# Patient Record
Sex: FEMALE | Race: WHITE | Employment: PART TIME | URBAN - NONMETROPOLITAN AREA
[De-identification: names, ages, dates, MRNs, and addresses within clinical notes are randomized per-mention and may not be internally consistent; named-entity substitution may affect disease eponyms.]

---

## 2019-10-07 ENCOUNTER — HOSPITAL ENCOUNTER (EMERGENCY)
Age: 39
Discharge: ANOTHER HOSPITAL | End: 2019-10-07
Payer: MEDICARE

## 2019-10-07 VITALS
OXYGEN SATURATION: 97 % | BODY MASS INDEX: 36.32 KG/M2 | HEIGHT: 60 IN | HEART RATE: 88 BPM | SYSTOLIC BLOOD PRESSURE: 133 MMHG | RESPIRATION RATE: 18 BRPM | TEMPERATURE: 98.7 F | DIASTOLIC BLOOD PRESSURE: 70 MMHG | WEIGHT: 185 LBS

## 2019-10-07 DIAGNOSIS — R10.9 FLANK PAIN: Primary | ICD-10-CM

## 2019-10-07 LAB
BILIRUBIN URINE: NEGATIVE
BLOOD, URINE: NEGATIVE
CHARACTER, URINE: CLEAR
COLOR: YELLOW
GLUCOSE, URINE: NEGATIVE MG/DL
KETONES, URINE: NEGATIVE
LEUKOCYTES, UA: NEGATIVE
NITRATE, UA: NEGATIVE
PH UA: 5.5 (ref 5–9)
PROTEIN UA: NEGATIVE MG/DL
REFLEX TO URINE C & S: NORMAL
SPECIFIC GRAVITY UA: 1.01 (ref 1–1.03)
UROBILINOGEN, URINE: 0.2 EU/DL (ref 0–1)

## 2019-10-07 PROCEDURE — 81003 URINALYSIS AUTO W/O SCOPE: CPT

## 2019-10-07 PROCEDURE — 99204 OFFICE O/P NEW MOD 45 MIN: CPT

## 2019-10-07 PROCEDURE — 99203 OFFICE O/P NEW LOW 30 MIN: CPT | Performed by: NURSE PRACTITIONER

## 2019-10-07 ASSESSMENT — PAIN DESCRIPTION - PROGRESSION: CLINICAL_PROGRESSION: GRADUALLY WORSENING

## 2019-10-07 ASSESSMENT — PAIN SCALES - GENERAL: PAINLEVEL_OUTOF10: 4

## 2019-10-07 ASSESSMENT — PAIN DESCRIPTION - PAIN TYPE: TYPE: ACUTE PAIN

## 2019-10-07 ASSESSMENT — ENCOUNTER SYMPTOMS: ABDOMINAL PAIN: 0

## 2019-10-07 ASSESSMENT — PAIN DESCRIPTION - DESCRIPTORS: DESCRIPTORS: ACHING;DISCOMFORT

## 2019-10-07 ASSESSMENT — PAIN DESCRIPTION - FREQUENCY: FREQUENCY: CONTINUOUS

## 2019-10-07 ASSESSMENT — PAIN DESCRIPTION - ORIENTATION: ORIENTATION: LEFT;MID

## 2019-10-07 ASSESSMENT — PAIN DESCRIPTION - LOCATION: LOCATION: BACK

## 2021-07-25 ENCOUNTER — HOSPITAL ENCOUNTER (EMERGENCY)
Age: 41
Discharge: HOME | End: 2021-07-25
Payer: COMMERCIAL

## 2021-07-25 VITALS
HEIGHT: 60 IN | BODY MASS INDEX: 40.44 KG/M2 | HEART RATE: 99 BPM | RESPIRATION RATE: 16 BRPM | TEMPERATURE: 97.6 F | DIASTOLIC BLOOD PRESSURE: 69 MMHG | OXYGEN SATURATION: 100 % | WEIGHT: 206 LBS | SYSTOLIC BLOOD PRESSURE: 153 MMHG

## 2021-07-25 DIAGNOSIS — K08.89 PAIN, DENTAL: Primary | ICD-10-CM

## 2021-07-25 PROCEDURE — 99213 OFFICE O/P EST LOW 20 MIN: CPT | Performed by: NURSE PRACTITIONER

## 2021-07-25 PROCEDURE — 99213 OFFICE O/P EST LOW 20 MIN: CPT

## 2021-07-25 RX ORDER — LIDOCAINE HYDROCHLORIDE 20 MG/ML
SOLUTION OROPHARYNGEAL
Qty: 50 ML | Refills: 0 | Status: SHIPPED | OUTPATIENT
Start: 2021-07-25 | End: 2022-09-26

## 2021-07-25 RX ORDER — CHLORHEXIDINE GLUCONATE 0.12 MG/ML
15 RINSE ORAL 2 TIMES DAILY
Qty: 420 ML | Refills: 0 | Status: SHIPPED | OUTPATIENT
Start: 2021-07-25 | End: 2021-08-08

## 2021-07-25 RX ORDER — AMOXICILLIN 500 MG/1
500 CAPSULE ORAL 2 TIMES DAILY
Qty: 20 CAPSULE | Refills: 0 | Status: SHIPPED | OUTPATIENT
Start: 2021-07-25 | End: 2021-08-04

## 2021-07-25 ASSESSMENT — PAIN DESCRIPTION - ORIENTATION: ORIENTATION: RIGHT;UPPER

## 2021-07-25 ASSESSMENT — ENCOUNTER SYMPTOMS
SORE THROAT: 0
VOMITING: 0
FACIAL SWELLING: 0
RHINORRHEA: 0
NAUSEA: 0

## 2021-07-25 ASSESSMENT — PAIN SCALES - GENERAL: PAINLEVEL_OUTOF10: 7

## 2021-07-25 ASSESSMENT — PAIN DESCRIPTION - LOCATION: LOCATION: TEETH

## 2021-07-25 NOTE — ED PROVIDER NOTES
[place] [address]  Urgent Care Encounter       CHIEF COMPLAINT       Chief Complaint   Patient presents with    Dental Pain       Nurses Notes reviewed and I agree except as noted in the HPI. HISTORY OF PRESENT ILLNESS   Chelsey Fall is a 37 y.o. female who presents with complaints of dental pain. Pain is stemming from a right upper second molar that had a root canal and cap approximately 1 year ago. Patient states she has had problems with this tooth ever since the root canal.  Over the last 4 to 5days the pain has become much worse. Patient thinks she may have been grinding her teeth due to stress. She has been unable to chew on the right side of her mouth and the tooth is extremely sensitive to pressure. Patient has a medical marijuana card has been smoking marijuana for the pain. She is trying to find a new dentist.    The history is provided by the patient. REVIEW OF SYSTEMS     Review of Systems   Constitutional: Negative for fever. HENT: Positive for dental problem. Negative for facial swelling, rhinorrhea and sore throat. Gastrointestinal: Negative for nausea and vomiting. Skin: Negative for wound. Neurological: Negative for headaches. PAST MEDICAL HISTORY         Diagnosis Date    Back pain     chronic    Cystitis, interstitial     Esophageal ulcer     Headache(784.0)     Hyperlipidemia     Insulin resistance     Irritable bowel syndrome     Spondylolisthesis        SURGICALHISTORY     Patient  has a past surgical history that includes cyst removal (2011); Hysterectomy (2010); Abdominal adhesion surgery (2012); Colonoscopy (2005, 2011); Tonsillectomy and adenoidectomy (1987); and [place] tooth extraction (1996). CURRENT MEDICATIONS       Previous Medications    No medications on file       ALLERGIES     Patient is is allergic to other. Patients   There is no immunization history on file for this patient.     FAMILY HISTORY     Patient's family history includes Alcohol Abuse in her father, mother, and sister; Arthritis in her father and mother; Heart Disease in her mother; Migraines in her mother; Obesity in her father; Osteoarthritis in her father and mother. SOCIAL HISTORY     Patient  reports that she has been smoking cigarettes. She has a 8.00 pack-year smoking history. She has never used smokeless tobacco. She reports current drug use. Drug: Marijuana. She reports that she does not drink alcohol. PHYSICAL EXAM     ED TRIAGE VITALS  BP: (!) 153/69, Temp: 97.6 °F (36.4 °C), Pulse: 99, Resp: 16, SpO2: 100 %,Estimated body mass index is 40.23 kg/m² as calculated from the following:    Height as of this encounter: 5' (1.524 m). Weight as of this encounter: 206 lb (93.4 kg). ,No LMP recorded. Patient has had a hysterectomy. Physical Exam  Vitals and nursing note reviewed. Constitutional:       General: She is not in acute distress. Appearance: She is well-developed. HENT:      Head: Normocephalic and atraumatic. Mouth/Throat:      Lips: Pink. Mouth: Mucous membranes are moist.      Dentition: Abnormal dentition (Right upper second molar capped). Dental tenderness (Right upper second molar) present. No gingival swelling or dental abscesses. Pulmonary:      Effort: Pulmonary effort is normal. No respiratory distress. Skin:     General: Skin is warm and dry. Neurological:      General: No focal deficit present. Mental Status: She is alert and oriented to person, place, and time. Psychiatric:         Mood and Affect: Mood normal.         Speech: Speech normal.         Behavior: Behavior normal. Behavior is cooperative. DIAGNOSTIC RESULTS     Labs:No results found for this visit on 07/25/21.     IMAGING:    No orders to display         EKG:      URGENT CARE COURSE:     Vitals:    07/25/21 1104   BP: (!) 153/69   Pulse: 99   Resp: 16   Temp: 97.6 °F (36.4 °C)   TempSrc: Infrared   SpO2: 100%   Weight: 206 lb (93.4 kg)   Height: 5' (1.524 m)       Medications - No data to display         PROCEDURES:  None    FINAL IMPRESSION      1. Pain, dental          DISPOSITION/ PLAN     Patient presents with complaints of dental pain stemming from a right upper second molar. This tooth has had a root canal and has been capped proximally 1 year ago. Patient be treated with amoxicillin and Peridex mouthwash. Viscous lidocaine as needed for pain. Tylenol or Motrin as needed for pain. Follow-up with a dentist in the next week for evaluation and treatment. Further instructions were outlined verbally and in the patient's discharge instructions. All the patient's questions were answered. The patient/parent agreed with the plan and was discharged from the [facility] in good condition. PATIENT REFERRED TO:  Sheila Kebede MD  [address] [place] [address] [place] / [facility] IDALMIS MURPHY .[facility] [id number]      DISCHARGE MEDICATIONS:  New Prescriptions    AMOXICILLIN (AMOXIL) 500 MG CAPSULE    Take 1 capsule by mouth 2 times daily for 10 days    CHLORHEXIDINE (PERIDEX) 0.12 % SOLUTION    Take 15 mLs by mouth 2 times daily for 14 days    LIDOCAINE VISCOUS HCL (XYLOCAINE) 2 % SOLN SOLUTION    Dab a small amount with a Q-tip on the affected tooth every 2 hours as needed for pain.        Discontinued Medications    No medications on file       Current Discharge Medication List          GOLDIE Hightower CNP    (Please note that portions of this note were completed with a voice recognition program. Efforts were made to edit the dictations but occasionally words are mis-transcribed.)         GOLDIE Hightower CNP  07/25/21 1702

## 2021-07-25 NOTE — ED NOTES
Pt. Released in stable condition, ambulated per self to private car. Instructed pt to follow-up with family doctor as needed for recheck or go directly to the emergency department for any concerns/worsening conditions. Pt. Verbalized understanding of instructions. No questions at this time. RX in hand.       Amaris Salmon RN  07/25/21 3762

## 2021-07-25 NOTE — ED TRIAGE NOTES
Has tooth pain in tooth that has had root canal/cap, upper right molar, has hurt since last year, has been grinding teeth at night

## 2021-09-06 ENCOUNTER — HOSPITAL ENCOUNTER (EMERGENCY)
Age: 41
Discharge: HOME | End: 2021-09-06
Payer: COMMERCIAL

## 2021-09-06 VITALS
WEIGHT: 200 LBS | BODY MASS INDEX: 39.27 KG/M2 | HEIGHT: 60 IN | DIASTOLIC BLOOD PRESSURE: 77 MMHG | RESPIRATION RATE: 18 BRPM | TEMPERATURE: 97 F | HEART RATE: 82 BPM | SYSTOLIC BLOOD PRESSURE: 150 MMHG | OXYGEN SATURATION: 95 %

## 2021-09-06 DIAGNOSIS — L23.7 ALLERGIC CONTACT DERMATITIS DUE TO PLANTS, EXCEPT FOOD: ICD-10-CM

## 2021-09-06 DIAGNOSIS — J06.9 ACUTE UPPER RESPIRATORY INFECTION: Primary | ICD-10-CM

## 2021-09-06 PROCEDURE — 99213 OFFICE O/P EST LOW 20 MIN: CPT

## 2021-09-06 PROCEDURE — 99214 OFFICE O/P EST MOD 30 MIN: CPT | Performed by: NURSE PRACTITIONER

## 2021-09-06 RX ORDER — NEBULIZER ACCESSORIES
1 KIT MISCELLANEOUS DAILY PRN
Qty: 1 KIT | Refills: 0 | Status: SHIPPED | OUTPATIENT
Start: 2021-09-06

## 2021-09-06 RX ORDER — AZITHROMYCIN 250 MG/1
TABLET, FILM COATED ORAL
Qty: 1 PACKET | Refills: 0 | Status: SHIPPED | OUTPATIENT
Start: 2021-09-06 | End: 2022-09-26

## 2021-09-06 RX ORDER — PREDNISONE 10 MG/1
TABLET ORAL
Qty: 28 TABLET | Refills: 0 | Status: SHIPPED | OUTPATIENT
Start: 2021-09-06 | End: 2021-09-20

## 2021-09-06 RX ORDER — TRIAMCINOLONE ACETONIDE 1 MG/G
CREAM TOPICAL
Qty: 45 G | Refills: 0 | Status: SHIPPED | OUTPATIENT
Start: 2021-09-06 | End: 2022-09-26

## 2021-09-06 RX ORDER — ALBUTEROL SULFATE 2.5 MG/3ML
2.5 SOLUTION RESPIRATORY (INHALATION) EVERY 6 HOURS PRN
Qty: 120 EACH | Refills: 0 | Status: SHIPPED | OUTPATIENT
Start: 2021-09-06

## 2021-09-06 ASSESSMENT — ENCOUNTER SYMPTOMS
NAUSEA: 0
CHEST TIGHTNESS: 1
COUGH: 1
SHORTNESS OF BREATH: 0
VOMITING: 0
SORE THROAT: 0
DIARRHEA: 0

## 2021-09-06 NOTE — ED PROVIDER NOTES
[place] [address]  Urgent Care Encounter       CHIEF COMPLAINT       Chief Complaint   Patient presents with    Sinusitis     pressure drainage congestionx 7 days     Rash     arms and neck       Nurses Notes reviewed and I agree except as noted in the HPI. HISTORY OF PRESENT ILLNESS   Chelsey Garcia is a 37 y.o. female who presents for evaluation of sinus pressure, cough, congestion and a pruritic rash to her bilateral arms and chest.  Patient states that the symptoms all began after she had been working outside roughly 7 or 8 days ago. She denies any fever, chills, loss of smell or taste or any known sick exposures. She states that she does not have any concern for Covid and does not want be tested for Covid. The history is provided by the patient. REVIEW OF SYSTEMS     Review of Systems   Constitutional: Negative for chills and fever. HENT: Positive for congestion. Negative for sore throat. Respiratory: Positive for cough and chest tightness. Negative for shortness of breath. Cardiovascular: Negative for chest pain. Gastrointestinal: Negative for diarrhea, nausea and vomiting. Musculoskeletal: Negative for arthralgias and myalgias. Skin: Positive for rash. Allergic/Immunologic: Negative for environmental allergies. Neurological: Negative for headaches. PAST MEDICAL HISTORY         Diagnosis Date    Back pain     chronic    Cystitis, interstitial     Esophageal ulcer     Headache(784.0)     Hyperlipidemia     Insulin resistance     Irritable bowel syndrome     Spondylolisthesis        SURGICALHISTORY     Patient  has a past surgical history that includes cyst removal (2011); Hysterectomy (2010); Abdominal adhesion surgery (2012); Colonoscopy (2005, 2011); Tonsillectomy and adenoidectomy (1987); and [place] tooth extraction (1996).     CURRENT MEDICATIONS       Previous Medications    LIDOCAINE VISCOUS HCL (XYLOCAINE) 2 % SOLN SOLUTION    Dab a small amount with a Q-tip on the affected tooth every 2 hours as needed for pain. ALLERGIES     Patient is is allergic to other. Patients   There is no immunization history on file for this patient. FAMILY HISTORY     Patient's family history includes Alcohol Abuse in her father, mother, and sister; Arthritis in her father and mother; Heart Disease in her mother; Migraines in her mother; Obesity in her father; Osteoarthritis in her father and mother. SOCIAL HISTORY     Patient  reports that she has been smoking cigarettes. She has a 16.00 pack-year smoking history. She has never used smokeless tobacco. She reports current alcohol use. She reports current drug use. Drug: Marijuana. PHYSICAL EXAM     ED TRIAGE VITALS  BP: (!) 150/77, Temp: 97 °F (36.1 °C), Pulse: 82, Resp: 18, SpO2: 95 %,Estimated body mass index is 39.06 kg/m² as calculated from the following:    Height as of this encounter: 5' (1.524 m). Weight as of this encounter: 200 lb (90.7 kg). ,No LMP recorded. Patient has had a hysterectomy. Physical Exam  Vitals and nursing note reviewed. Constitutional:       General: She is not in acute distress. Appearance: She is well-developed. She is not diaphoretic. HENT:      Right Ear: Tympanic membrane and ear canal normal.      Left Ear: Tympanic membrane and ear canal normal.      Mouth/Throat:      Mouth: Mucous membranes are moist.      Pharynx: Oropharynx is clear. Eyes:      Conjunctiva/sclera:      Right eye: Right conjunctiva is not injected. Left eye: Left conjunctiva is not injected. Pupils: Pupils are equal.   Cardiovascular:      Rate and Rhythm: Normal rate and regular rhythm. Heart sounds: No murmur heard. Pulmonary:      Effort: Pulmonary effort is normal. No respiratory distress. Breath sounds: Examination of the right-upper field reveals wheezing. Examination of the left-upper field reveals wheezing. Wheezing present. No decreased breath sounds. Musculoskeletal:      Cervical back: Normal range of motion. Right knee: Normal range of motion. Left knee: Normal range of motion. Skin:     General: Skin is warm. Findings: No rash. Neurological:      Mental Status: She is alert and oriented to person, place, and time. Psychiatric:         Behavior: Behavior normal.         DIAGNOSTIC RESULTS     Labs:No results found for this visit on 09/06/21. IMAGING:    No orders to display         EKG:  none    URGENT CARE COURSE:     Vitals:    09/06/21 1846   BP: (!) 150/77   Pulse: 82   Resp: 18   Temp: 97 °F (36.1 °C)   TempSrc: Temporal   SpO2: 95%   Weight: 200 lb (90.7 kg)   Height: 5' (1.524 m)       Medications - No data to display         PROCEDURES:  None    FINAL IMPRESSION      1. Acute upper respiratory infection    2. Allergic contact dermatitis due to plants, except food          DISPOSITION/ PLAN     I discussed with the patient the plan to treat with oral steroids for both her contact dermatitis and her upper respiratory infection. She will also be given a prescription for azithromycin and an albuterol nebulizer solution with the nebulizer kit itself. Patient is also given triamcinolone cream for the contact dermatitis and is advised to follow-up on an outpatient basis as needed. She is agreeable to plan as discussed. PATIENT REFERRED TO:  Fatoumata Culp MD  [address] [facility] [address] / [facility] [address]      DISCHARGE MEDICATIONS:  New Prescriptions    ALBUTEROL (PROVENTIL) (2.5 MG/3ML) 0.083% NEBULIZER SOLUTION    Take 3 mLs by nebulization every 6 hours as needed for Wheezing or Shortness of Breath    AZITHROMYCIN (ZITHROMAX Z-REBECCA) 250 MG TABLET    Take 2 tablets (500 mg) on Day 1, and then take 1 tablet (250 mg) on days 2 through 5. PREDNISONE (DELTASONE) 10 MG TABLET    Take 3 tablets by mouth daily for 7 days, THEN 1 tablet daily for 7 days.     RESPIRATORY THERAPY SUPPLIES (NEBULIZER/TUBING/MOUTHPIECE) KIT    1 kit by Does not apply route daily as needed (wheezing, shortness of breath)    TRIAMCINOLONE (KENALOG) 0.1 % CREAM    Apply topically 2 times daily.        Discontinued Medications    No medications on file       Current Discharge Medication List          GOLDIE Shah CNP    (Please note that portions of this note were completed with a voice recognition program. Efforts were made to edit the dictations but occasionally words are mis-transcribed.)          GOLDIE Shah CNP  09/06/21 4162

## 2021-09-06 NOTE — ED TRIAGE NOTES
Pt ambulatory into [facility] with c/o sinus congestion. Pressure and drainage with  Cough and rash to arms and neck for the past ten days. Pt states no pain. Pt states she thinks she has sinus infection and poison ivy.

## 2022-07-20 ENCOUNTER — HOSPITAL ENCOUNTER (EMERGENCY)
Age: 42
Discharge: LEFT BEFORE DISCHARGE | End: 2022-07-20
Attending: EMERGENCY MEDICINE
Payer: COMMERCIAL

## 2022-07-20 ENCOUNTER — APPOINTMENT (OUTPATIENT)
Dept: CT IMAGING | Age: 42
End: 2022-07-20
Payer: COMMERCIAL

## 2022-07-20 VITALS
RESPIRATION RATE: 17 BRPM | OXYGEN SATURATION: 99 % | TEMPERATURE: 99.3 F | HEART RATE: 77 BPM | WEIGHT: 200 LBS | SYSTOLIC BLOOD PRESSURE: 138 MMHG | DIASTOLIC BLOOD PRESSURE: 87 MMHG | BODY MASS INDEX: 39.06 KG/M2

## 2022-07-20 DIAGNOSIS — G44.82 HEADACHE ASSOCIATED WITH SEXUAL ACTIVITY: Primary | ICD-10-CM

## 2022-07-20 PROCEDURE — 2580000003 HC RX 258: Performed by: NURSE PRACTITIONER

## 2022-07-20 PROCEDURE — 70450 CT HEAD/BRAIN W/O DYE: CPT

## 2022-07-20 PROCEDURE — 6360000002 HC RX W HCPCS: Performed by: NURSE PRACTITIONER

## 2022-07-20 PROCEDURE — 96375 TX/PRO/DX INJ NEW DRUG ADDON: CPT

## 2022-07-20 PROCEDURE — 99284 EMERGENCY DEPT VISIT MOD MDM: CPT

## 2022-07-20 PROCEDURE — 96374 THER/PROPH/DIAG INJ IV PUSH: CPT

## 2022-07-20 RX ORDER — DEXAMETHASONE SODIUM PHOSPHATE 4 MG/ML
10 INJECTION, SOLUTION INTRA-ARTICULAR; INTRALESIONAL; INTRAMUSCULAR; INTRAVENOUS; SOFT TISSUE ONCE
Status: DISCONTINUED | OUTPATIENT
Start: 2022-07-20 | End: 2022-07-20 | Stop reason: HOSPADM

## 2022-07-20 RX ORDER — 0.9 % SODIUM CHLORIDE 0.9 %
500 INTRAVENOUS SOLUTION INTRAVENOUS ONCE
Status: COMPLETED | OUTPATIENT
Start: 2022-07-20 | End: 2022-07-20

## 2022-07-20 RX ORDER — PROCHLORPERAZINE EDISYLATE 5 MG/ML
10 INJECTION INTRAMUSCULAR; INTRAVENOUS ONCE
Status: COMPLETED | OUTPATIENT
Start: 2022-07-20 | End: 2022-07-20

## 2022-07-20 RX ORDER — KETOROLAC TROMETHAMINE 30 MG/ML
30 INJECTION, SOLUTION INTRAMUSCULAR; INTRAVENOUS ONCE
Status: COMPLETED | OUTPATIENT
Start: 2022-07-20 | End: 2022-07-20

## 2022-07-20 RX ADMIN — SODIUM CHLORIDE 500 ML: 9 INJECTION, SOLUTION INTRAVENOUS at 19:35

## 2022-07-20 RX ADMIN — KETOROLAC TROMETHAMINE 30 MG: 30 INJECTION, SOLUTION INTRAMUSCULAR; INTRAVENOUS at 20:43

## 2022-07-20 RX ADMIN — PROCHLORPERAZINE EDISYLATE 10 MG: 5 INJECTION INTRAMUSCULAR; INTRAVENOUS at 19:33

## 2022-07-20 ASSESSMENT — ENCOUNTER SYMPTOMS
WHEEZING: 0
VOMITING: 0
CONSTIPATION: 0
COUGH: 0
NAUSEA: 0
COLOR CHANGE: 0
SHORTNESS OF BREATH: 0
EYE DISCHARGE: 0
EYE PAIN: 0
DIARRHEA: 0
SORE THROAT: 0
RHINORRHEA: 0
ABDOMINAL DISTENTION: 0

## 2022-07-20 ASSESSMENT — PAIN DESCRIPTION - FREQUENCY: FREQUENCY: CONTINUOUS

## 2022-07-20 ASSESSMENT — PAIN DESCRIPTION - PAIN TYPE: TYPE: ACUTE PAIN

## 2022-07-20 ASSESSMENT — PAIN - FUNCTIONAL ASSESSMENT: PAIN_FUNCTIONAL_ASSESSMENT: 0-10

## 2022-07-20 ASSESSMENT — PAIN SCALES - GENERAL
PAINLEVEL_OUTOF10: 8
PAINLEVEL_OUTOF10: 9

## 2022-07-20 ASSESSMENT — PAIN DESCRIPTION - ONSET: ONSET: ON-GOING

## 2022-07-20 ASSESSMENT — PAIN DESCRIPTION - DESCRIPTORS: DESCRIPTORS: ACHING

## 2022-07-20 ASSESSMENT — PAIN DESCRIPTION - LOCATION: LOCATION: HEAD

## 2022-07-20 NOTE — ED PROVIDER NOTES
[place] ENCOUNTER          Pt Name: Ne Seo  MRN: [id number]  [place] 1980  Date of evaluation: 7/20/2022  Treating Resident Physician: Ulises Bianchi MD  Supervising Physician:     History obtained from the patient. CHIEF COMPLAINT       Chief Complaint   Patient presents with    Headache           HISTORY OF PRESENT ILLNESS    HPI  Ne Seo is a 40 y.o. female who presents to the emergency department for evaluation of headache. Patient states that intermittently last 2 weeks that occurs while having intercourse. Patient states that the pain is to back of head that has been 8 or 10 on pain scale. She describes it as aching. Patient denies any additional modifying factors. Patient states she was seen at [facility] last night when she had headache after intercourse and was given Toradol and Zofran that was effective. Patient states that this time she had a CT of the head that was negative. Patient states that over 10 years ago she also had similar migraines and was told that she had pseudotumor cerebri. Patient stated at the time she had a lumbar puncture and afterwards symptoms resolved and she rarely has migraines now. The patient has no other acute complaints at this time. REVIEW OF SYSTEMS   Review of Systems   Constitutional:  Negative for fatigue and fever. HENT:  Negative for ear pain, rhinorrhea and sore throat. Eyes:  Negative for pain and discharge. Respiratory:  Negative for cough, shortness of breath and wheezing. Cardiovascular:  Negative for chest pain, palpitations and leg swelling. Gastrointestinal:  Negative for abdominal distention, constipation, diarrhea, nausea and vomiting. Endocrine: Negative for polydipsia and polyuria. Genitourinary:  Negative for difficulty urinating and dysuria. Musculoskeletal:  Negative for arthralgias.    Skin:  Negative for color change, pallor and rash.   Neurological:  Positive for headaches. Negative for dizziness, seizures, syncope, weakness and numbness. Psychiatric/Behavioral:  Negative for agitation and confusion. PAST MEDICAL AND SURGICAL HISTORY     Past Medical History:   Diagnosis Date    Back pain     chronic    Cystitis, interstitial     Esophageal ulcer     Headache(784.0)     Hyperlipidemia     Insulin resistance     Irritable bowel syndrome     Spondylolisthesis      Past Surgical History:   Procedure Laterality Date    ABDOMINAL ADHESION SURGERY  2012    Dr. Denyce Dakin  2005, 2011    Dr. Dannielle Vivar  2011    right ankle-Dr. Jeremias Grier    HYSTERECTOMY (CERVIX STATUS UNKNOWN)  2010    Dr. Baism Ordonez     Current Facility-Administered Medications:     0.9 % sodium chloride bolus, 500 mL, IntraVENous, Once, Janice Hatfield MD, Last Rate: 247.9 mL/hr at 07/20/22 1935, 500 mL at 07/20/22 1935    ketorolac (TORADOL) injection 30 mg, 30 mg, IntraVENous, Once, Janice Hatfield MD    Current Outpatient Medications:     azithromycin (ZITHROMAX Z-REBECCA) 250 MG tablet, Take 2 tablets (500 mg) on Day 1, and then take 1 tablet (250 mg) on days 2 through 5., Disp: 1 packet, Rfl: 0    triamcinolone (KENALOG) 0.1 % cream, Apply topically 2 times daily. , Disp: 45 g, Rfl: 0    Respiratory Therapy Supplies (NEBULIZER/TUBING/MOUTHPIECE) KIT, 1 kit by Does not apply route daily as needed (wheezing, shortness of breath), Disp: 1 kit, Rfl: 0    albuterol (PROVENTIL) (2.5 MG/3ML) 0.083% nebulizer solution, Take 3 mLs by nebulization every 6 hours as needed for Wheezing or Shortness of Breath, Disp: 120 each, Rfl: 0    lidocaine viscous hcl (XYLOCAINE) 2 % SOLN solution, Dab a small amount with a Q-tip on the affected tooth every 2 hours as needed for pain., Disp: 50 mL, Rfl: 0      SOCIAL HISTORY     Social History     Social History Narrative    Not on file Social History     Tobacco Use    Smoking status: Every Day     Packs/day: 1.00     Years: 16.00     Pack years: 16.00     Types: Cigarettes    Smokeless tobacco: Never   Substance Use Topics    Alcohol use: Yes    Drug use: Yes     Types: Marijuana (Weed)     Comment: has med card last used few hours ago         ALLERGIES     Allergies   Allergen Reactions    Other Other (See Comments)     Curry surgical scrub          FAMILY HISTORY     Family History   Problem Relation Age of Onset    Osteoarthritis Mother     Arthritis Mother     Migraines Mother     Heart Disease Mother     Alcohol Abuse Mother     Osteoarthritis Father     Obesity Father     Arthritis Father     Alcohol Abuse Father     Alcohol Abuse Sister          PREVIOUS RECORDS   Previous records reviewed: 7/25/21 thru current         PHYSICAL EXAM     ED Triage Vitals   BP Temp Temp Source Heart Rate Resp SpO2 Height Weight   07/20/22 1654 07/20/22 1654 07/20/22 1654 07/20/22 1654 07/20/22 1655 07/20/22 1655 -- 07/20/22 1654   138/87 99.3 °F (37.4 °C) Oral 77 17 99 %  200 lb (90.7 kg)     Initial vital signs and nursing assessment reviewed and normal. Body mass index is 39.06 kg/m². Pulsoximetry is normal per my interpretation. Additional Vital Signs:  Vitals:    07/20/22 1655   BP:    Pulse:    Resp: 17   Temp:    SpO2: 99%       Physical Exam  Constitutional:       Appearance: Normal appearance. HENT:      Head: Normocephalic. Right Ear: External ear normal.      Left Ear: External ear normal.      Nose: Nose normal.      Mouth/Throat:      Mouth: Mucous membranes are moist.      Pharynx: Oropharynx is clear. Eyes:      Extraocular Movements: Extraocular movements intact. Conjunctiva/sclera: Conjunctivae normal.      Pupils: Pupils are equal, round, and reactive to light. Cardiovascular:      Rate and Rhythm: Normal rate and regular rhythm. Pulses: Normal pulses. Heart sounds: Normal heart sounds.    Pulmonary: Effort: Pulmonary effort is normal.      Breath sounds: Normal breath sounds. Abdominal:      General: Bowel sounds are normal.      Palpations: Abdomen is soft. Musculoskeletal:         General: Normal range of motion. Cervical back: Normal range of motion and neck supple. Skin:     General: Skin is warm and dry. Capillary Refill: Capillary refill takes less than 2 seconds. Neurological:      General: No focal deficit present. Mental Status: She is alert and oriented to person, place, and time. Psychiatric:         Mood and Affect: Mood normal.         Behavior: Behavior normal.           MEDICAL DECISION MAKING   Initial Assessment:     Patient is a 39 yo patient with complaint of headache intermittently x2 wks. Patient states headaches are occurring mostly with intercourse. Patient differential diagnosis includes but is not limited to headache acute, migraine, aneurysm, dehydration. Plan:   CT head without  Analgesia as needed for pain    To Dr. Yue Rodriguez neurologist on-call and he recommended CTA of head and neck with and without contrast as well as MRI of brain. He stated that patient can receive Decadron 10 mg now x1 and to start Topamax 25 mg twice daily with plan to increase to 50 mg twice daily and follow-up with Dr. Chisholm. Discussed recommendation with patient and she stated she did not want to have further testing at this time. Discussed in great detail concerns of symptoms and need for further evaluation. Discussed risk of leaving AGAINST MEDICAL ADVICE at this time. Patient verbalized good understanding and stated she would follow-up with Dr. Chisholm outpatient. Not with pulmonary evaluation at this time              ED RESULTS   Laboratory results:  Labs Reviewed - No data to display    Radiologic studies results:  CT HEAD WO CONTRAST   Final Result   1. No acute intracranial findings. This document has been electronically signed by:  Lucille Rolon MD on 07/20/2022 08:12 PM      All CTs at this facility use dose modulation techniques and iterative    reconstructions, and/or weight-based dosing   when appropriate to reduce radiation to a low as reasonably achievable. ED Medications administered this visit:   Medications   0.9 % sodium chloride bolus (500 mLs IntraVENous New Bag 7/20/22 1935)   ketorolac (TORADOL) injection 30 mg (has no administration in time range)   prochlorperazine (COMPAZINE) injection 10 mg (10 mg IntraVENous Given 7/20/22 1933)         ED COURSE      Strict return precautions and follow up instructions were discussed with the patient prior to discharge, with which the patient agrees. MEDICATION CHANGES     New Prescriptions    No medications on file         FINAL DISPOSITION     Final diagnoses:   Acute nonintractable headache, unspecified headache type     Condition: condition: fair  Dispo: Discharge to home      This transcription was electronically signed. Parts of this transcriptions may have been dictated by use of voice recognition software and electronically transcribed, and parts may have been transcribed with the assistance of an ED scribe. The transcription may contain errors not detected in proofreading. Please refer to my supervising physician's documentation if my documentation differs.     Electronically Signed: Moe Klein MD, 07/20/22, 8:39 PM        Moe Klein MD  Resident  07/20/22 1603

## 2022-07-20 NOTE — ED NOTES
Patient to ED with migraine headache. She states that she was seen at [facility] ED last night. Patient states that she has had this headache for the past two weeks. Patient is resting in chair with easy and unlabored respirations. No distress noted. Patient denies further complaints or concerns.         Luella Kehr, RN  07/20/22 7990

## 2022-07-21 ENCOUNTER — HOSPITAL ENCOUNTER (EMERGENCY)
Age: 42
Discharge: HOME | End: 2022-07-21
Attending: EMERGENCY MEDICINE
Payer: COMMERCIAL

## 2022-07-21 ENCOUNTER — APPOINTMENT (OUTPATIENT)
Dept: CT IMAGING | Age: 42
End: 2022-07-21
Payer: COMMERCIAL

## 2022-07-21 ENCOUNTER — APPOINTMENT (OUTPATIENT)
Dept: MRI IMAGING | Age: 42
End: 2022-07-21
Payer: COMMERCIAL

## 2022-07-21 VITALS
SYSTOLIC BLOOD PRESSURE: 138 MMHG | RESPIRATION RATE: 18 BRPM | TEMPERATURE: 98.6 F | HEART RATE: 57 BPM | OXYGEN SATURATION: 97 % | DIASTOLIC BLOOD PRESSURE: 78 MMHG

## 2022-07-21 DIAGNOSIS — G44.82 HEADACHE ASSOCIATED WITH SEXUAL ACTIVITY: Primary | ICD-10-CM

## 2022-07-21 LAB
FLU A ANTIGEN: NEGATIVE
FLU B ANTIGEN: NEGATIVE
SARS-COV-2, NAAT: NOT  DETECTED

## 2022-07-21 PROCEDURE — 70498 CT ANGIOGRAPHY NECK: CPT

## 2022-07-21 PROCEDURE — 99285 EMERGENCY DEPT VISIT HI MDM: CPT

## 2022-07-21 PROCEDURE — 96375 TX/PRO/DX INJ NEW DRUG ADDON: CPT

## 2022-07-21 PROCEDURE — 70496 CT ANGIOGRAPHY HEAD: CPT

## 2022-07-21 PROCEDURE — 87804 INFLUENZA ASSAY W/OPTIC: CPT

## 2022-07-21 PROCEDURE — 87635 SARS-COV-2 COVID-19 AMP PRB: CPT

## 2022-07-21 PROCEDURE — 96374 THER/PROPH/DIAG INJ IV PUSH: CPT

## 2022-07-21 PROCEDURE — 70551 MRI BRAIN STEM W/O DYE: CPT

## 2022-07-21 PROCEDURE — 6360000002 HC RX W HCPCS

## 2022-07-21 PROCEDURE — 96372 THER/PROPH/DIAG INJ SC/IM: CPT

## 2022-07-21 PROCEDURE — 6360000004 HC RX CONTRAST MEDICATION

## 2022-07-21 RX ORDER — PROCHLORPERAZINE EDISYLATE 5 MG/ML
10 INJECTION INTRAMUSCULAR; INTRAVENOUS ONCE
Status: COMPLETED | OUTPATIENT
Start: 2022-07-21 | End: 2022-07-21

## 2022-07-21 RX ORDER — DEXAMETHASONE SODIUM PHOSPHATE 4 MG/ML
10 INJECTION, SOLUTION INTRA-ARTICULAR; INTRALESIONAL; INTRAMUSCULAR; INTRAVENOUS; SOFT TISSUE ONCE
Status: COMPLETED | OUTPATIENT
Start: 2022-07-21 | End: 2022-07-21

## 2022-07-21 RX ORDER — LORAZEPAM 2 MG/ML
0.5 INJECTION INTRAMUSCULAR ONCE
Status: COMPLETED | OUTPATIENT
Start: 2022-07-21 | End: 2022-07-21

## 2022-07-21 RX ORDER — TOPIRAMATE 25 MG/1
25 TABLET ORAL 2 TIMES DAILY
Qty: 60 TABLET | Refills: 0 | Status: SHIPPED | OUTPATIENT
Start: 2022-07-21 | End: 2022-09-26

## 2022-07-21 RX ORDER — KETOROLAC TROMETHAMINE 30 MG/ML
30 INJECTION, SOLUTION INTRAMUSCULAR; INTRAVENOUS ONCE
Status: COMPLETED | OUTPATIENT
Start: 2022-07-21 | End: 2022-07-21

## 2022-07-21 RX ADMIN — LORAZEPAM 0.5 MG: 2 INJECTION INTRAMUSCULAR; INTRAVENOUS at 17:31

## 2022-07-21 RX ADMIN — IOPAMIDOL 80 ML: 755 INJECTION, SOLUTION INTRAVENOUS at 17:54

## 2022-07-21 RX ADMIN — PROCHLORPERAZINE EDISYLATE 10 MG: 5 INJECTION INTRAMUSCULAR; INTRAVENOUS at 17:30

## 2022-07-21 RX ADMIN — KETOROLAC TROMETHAMINE 30 MG: 30 INJECTION, SOLUTION INTRAMUSCULAR; INTRAVENOUS at 17:31

## 2022-07-21 RX ADMIN — DEXAMETHASONE SODIUM PHOSPHATE 10 MG: 4 INJECTION, SOLUTION INTRAMUSCULAR; INTRAVENOUS at 20:44

## 2022-07-21 ASSESSMENT — ENCOUNTER SYMPTOMS
ABDOMINAL PAIN: 0
NAUSEA: 0
VOMITING: 0
WHEEZING: 0
SINUS PAIN: 0
SHORTNESS OF BREATH: 0
RHINORRHEA: 0
DIARRHEA: 0
BACK PAIN: 1
BLOOD IN STOOL: 0
SORE THROAT: 0
TROUBLE SWALLOWING: 0
PHOTOPHOBIA: 0
SINUS PRESSURE: 0
VOICE CHANGE: 0
COUGH: 0
CONSTIPATION: 0

## 2022-07-21 ASSESSMENT — PAIN DESCRIPTION - ORIENTATION: ORIENTATION: POSTERIOR;LOWER

## 2022-07-21 ASSESSMENT — PAIN DESCRIPTION - LOCATION: LOCATION: HEAD

## 2022-07-21 ASSESSMENT — PAIN - FUNCTIONAL ASSESSMENT
PAIN_FUNCTIONAL_ASSESSMENT: 0-10
PAIN_FUNCTIONAL_ASSESSMENT: 0-10

## 2022-07-21 ASSESSMENT — PAIN SCALES - GENERAL: PAINLEVEL_OUTOF10: 3

## 2022-07-21 ASSESSMENT — PAIN DESCRIPTION - DESCRIPTORS: DESCRIPTORS: ACHING

## 2022-07-21 NOTE — ED NOTES
Dr. Liliane Wong is at bedside to discuss treatment plan with patient and patient is wanting to sign out AMA. Dr. Liliane Wong discussed the risks involved with leaving AMA. Pt has signed AMA forms.       Alyssa Rosas RN  07/20/22 1477

## 2022-07-21 NOTE — ED NOTES
Pt is laying in bed at this time with respers regular and unlabored. Pt denies any needs at this time and is updated on POC. Pt rates HA pain a 7/10 at this time and states that her pain is posterior and lower head. Call light is within reach.       Lizbeth Lopez RN  07/21/22 4783

## 2022-07-21 NOTE — ED PROVIDER NOTES
[facility]          Pt Name: Lisa Nevarez  MRN: [id number]  [place] 1980  Date of evaluation: 7/21/2022  Treating Resident Physician: Carla Irving MD  Supervising Physician: Antwan    History obtained from chart review and the patient. CHIEF COMPLAINT       Chief Complaint   Patient presents with    Migraine           HISTORY OF PRESENT ILLNESS    HPI  Lisa Nevarez is a 40 y.o. female w/ PMH IIH, FM, who presents to the emergency department for evaluation of 2 wks of intermittent headaches typically post coital onset rated 5-7/10 in pain starting from occipital towards bitemporal. Of note Pt was seen 24 hrs prior for same complaint in ED and neurology was consulted, pain improved s/p toradol cocktail, and pt had left prior to imaging. Pt states today pt return as pain continues and Pt wanted to continue imaging work up. Pt amicable to following neurology Opt. The patient has no other acute complaints at this time. REVIEW OF SYSTEMS   Review of Systems   Constitutional:  Negative for chills, diaphoresis, fatigue and fever. HENT:  Negative for congestion, postnasal drip, rhinorrhea, sinus pressure, sinus pain, sore throat, trouble swallowing and voice change. Eyes:  Negative for photophobia and visual disturbance. Respiratory:  Negative for cough, shortness of breath and wheezing. Cardiovascular:  Negative for chest pain, palpitations and leg swelling. Gastrointestinal:  Negative for abdominal pain, blood in stool, constipation, diarrhea, nausea and vomiting. Genitourinary:  Negative for decreased urine volume, difficulty urinating, dysuria, frequency, hematuria, menstrual problem, pelvic pain and urgency. Musculoskeletal:  Positive for back pain and myalgias. Negative for arthralgias, gait problem, joint swelling, neck pain and neck stiffness. Skin:  Negative for rash and wound.    Neurological:  Positive for headaches. Negative for dizziness, seizures, syncope, weakness, light-headedness and numbness. Psychiatric/Behavioral:  Negative for behavioral problems, decreased concentration, dysphoric mood, self-injury and sleep disturbance. The patient is not nervous/anxious. PAST MEDICAL AND SURGICAL HISTORY     Past Medical History:   Diagnosis Date    Back pain     chronic    Cystitis, interstitial     Esophageal ulcer     Headache(784.0)     Hyperlipidemia     Insulin resistance     Irritable bowel syndrome     Spondylolisthesis      Past Surgical History:   Procedure Laterality Date    ABDOMINAL ADHESION SURGERY  2012    Dr. Nora Herron  2005, 2011    Dr. Kevin Pike  2011    right ankle-Dr. Aldo Trujillo    HYSTERECTOMY ([address])  2010    Dr. Venegas [facility]   No current facility-administered medications for this encounter. Current Outpatient Medications:     topiramate (TOPAMAX) 25 MG tablet, Take 1 tablet by mouth in the morning and 1 tablet before bedtime. , Disp: 60 tablet, Rfl: 0    azithromycin (ZITHROMAX Z-REBECCA) 250 MG tablet, Take 2 tablets (500 mg) on Day 1, and then take 1 tablet (250 mg) on days 2 through 5., Disp: 1 packet, Rfl: 0    triamcinolone (KENALOG) 0.1 % cream, Apply topically 2 times daily. , Disp: 45 g, Rfl: 0    Respiratory Therapy Supplies (NEBULIZER/TUBING/MOUTHPIECE) KIT, 1 kit by Does not apply route daily as needed (wheezing, shortness of breath), Disp: 1 kit, Rfl: 0    albuterol (PROVENTIL) (2.5 MG/3ML) 0.083% nebulizer solution, Take 3 mLs by nebulization every 6 hours as needed for Wheezing or Shortness of Breath, Disp: 120 each, Rfl: 0    lidocaine viscous hcl (XYLOCAINE) 2 % SOLN solution, Dab a small amount with a Q-tip on the affected tooth every 2 hours as needed for pain., Disp: 50 mL, Rfl: 0      SOCIAL HISTORY     Social History     Social History Narrative Not on file     Social History     Tobacco Use    Smoking status: Every Day     Packs/day: 1.00     Years: 16.00     Pack years: 16.00     Types: Cigarettes    Smokeless tobacco: Never   Substance Use Topics    Alcohol use: Yes    Drug use: Yes     Types: Marijuana (Weed)     Comment: has med card last used few hours ago         ALLERGIES     Allergies   Allergen Reactions    Other Other (See Comments)     [place] surgical scrub          FAMILY HISTORY     Family History   Problem Relation Age of Onset    Osteoarthritis Mother     Arthritis Mother     Migraines Mother     Heart Disease Mother     Alcohol Abuse Mother     Osteoarthritis Father     Obesity Father     Arthritis Father     Alcohol Abuse Father     Alcohol Abuse Sister          PREVIOUS RECORDS   Previous records reviewed:  ED Notes Jul/20/2022 reviewd, noted neurology recommendations. .        PHYSICAL EXAM     ED Triage Vitals [07/21/22 1453]   BP Temp Temp Source Heart Rate Resp SpO2 Height Weight   132/77 98.6 °F (37 °C) Oral 84 18 97 % -- --     Initial vital signs and nursing assessment reviewed and normal. There is no height or weight on file to calculate BMI. Pulsoximetry is normal per my interpretation. Additional Vital Signs:  Vitals:    07/21/22 2046   BP: 138/78   Pulse: 57   Resp: 18   Temp:    SpO2: 97%       Physical Exam  Constitutional:       General: She is not in acute distress. Appearance: Normal appearance. She is not ill-appearing or diaphoretic. HENT:      Head: Normocephalic and atraumatic. Nose: Nose normal. No congestion or rhinorrhea. Mouth/Throat:      Mouth: Mucous membranes are moist.      Pharynx: Oropharynx is clear. No oropharyngeal exudate or posterior oropharyngeal erythema. Eyes:      General: No scleral icterus. Right eye: No discharge. Left eye: No discharge. Extraocular Movements: Extraocular movements intact.       Conjunctiva/sclera: Conjunctivae normal.      Pupils: Pupils are equal, round, and reactive to light. Cardiovascular:      Rate and Rhythm: Normal rate. Pulses: Normal pulses. Heart sounds: Normal heart sounds. No murmur heard. No friction rub. No gallop. Pulmonary:      Effort: Pulmonary effort is normal. No respiratory distress. Breath sounds: Normal breath sounds. No wheezing, rhonchi or rales. Abdominal:      General: Abdomen is flat. Bowel sounds are normal. There is no distension. Palpations: Abdomen is soft. Tenderness: There is no abdominal tenderness. There is no guarding. Musculoskeletal:         General: No swelling, tenderness or signs of injury. Skin:     General: Skin is warm and dry. Capillary Refill: Capillary refill takes less than 2 seconds. Coloration: Skin is not jaundiced or pale. Findings: No erythema or rash. Neurological:      General: No focal deficit present. Mental Status: She is alert and oriented to person, place, and time. Cranial Nerves: No cranial nerve deficit. Sensory: No sensory deficit. Motor: No weakness. Gait: Gait normal.   Psychiatric:         Mood and Affect: Mood normal.         Behavior: Behavior normal.           MEDICAL DECISION MAKING   Initial Assessment:   Pti s a 39 y/o female w/ PMH IIH, FM, who presents to the emergency department for evaluation of 2 wks of intermittent headaches typically post coital onset rated 5-7/10 in pain starting from occipital towards bitemporal. Of note Pt was seen 24 hrs prior for same complaint in ED and neurology was consulted, pain improved s/p toradol cocktail, and pt had left prior to imaging. Pt states today pt return as pain continues and Pt wanted to continue imaging work up. Pt amicable to following neurology Opt. .Physical exam grossly nonfocal, no meningeal signs.  Imaging studies grossly unremarkable  Ddx includes Post coital headache, Tension headache, migraine, IIH headache, vascular headache, SAH, meningitis. Plan:   MRI, CTA head and neck per previous neurology recommendations  Headache cocktail of: Toradol, compazine  Decadron 10 iv x 1  Start Topomax 25 BID Opt.  F/U [facility] neurology      ED RESULTS   Laboratory results:  Labs Reviewed   COVID-19, RAPID   RAPID INFLUENZA A/B ANTIGENS       Radiologic studies results:  MRI BRAIN WO CONTRAST   Final Result   1. No acute intracranial findings. No evidence of acute ischemia, mass or    mass-effect. This document has been electronically signed by: Hellen Burt MD on    07/21/2022 07:28 PM      CTA HEAD W WO CONTRAST   Final Result   1. No evidence of hemodynamically significant stenosis in the major    arteries of the head. This document has been electronically signed by: Hellen Burt MD on    07/21/2022 06:23 PM      All CTs at this facility use dose modulation techniques and iterative    reconstructions, and/or weight-based dosing   when appropriate to reduce radiation to a low as reasonably achievable. 3D Post-processing was performed on this study. CTA NECK W WO CONTRAST   Final Result   1. No evidence of hemodynamically significant stenosis in the major    arteries of the neck. This document has been electronically signed by: Hellen Burt MD on    07/21/2022 06:19 PM      All CTs at this facility use dose modulation techniques and iterative    reconstructions, and/or weight-based dosing   when appropriate to reduce radiation to a low as reasonably achievable. Carotid stenosis and measurements are in accordance with NASCET criteria. 3D Post-processing was performed on this study.           ED Medications administered this visit:   Medications   ketorolac (TORADOL) injection 30 mg (30 mg IntraMUSCular Given 7/21/22 1731)   prochlorperazine (COMPAZINE) injection 10 mg (10 mg IntraVENous Given 7/21/22 1730)   LORazepam (ATIVAN) injection 0.5 mg (0.5 mg IntraVENous Given 7/21/22 1731)   iopamidol (ISOVUE-370) 76 % injection 80 mL (80 mLs IntraVENous Given 7/21/22 1754)   Dexamethasone Sodium Phosphate injection 10 mg (10 mg IntraVENous Given 7/21/22 2044)         ED COURSE        MRI and CTA imaging reviewd with PT, no gross acute abnormalities  Pt headache improved s/p toradol cocktail. Follow up with neurology opt. Pt amicable to discharge. Strict return precautions and follow up instructions were discussed with the patient prior to discharge, with which the patient agrees. MEDICATION CHANGES     Discharge Medication List as of 7/21/2022  8:53 PM        START taking these medications    Details   topiramate (TOPAMAX) 25 MG tablet Take 1 tablet by mouth in the morning and 1 tablet before bedtime. , Disp-60 tablet, R-0Normal               FINAL DISPOSITION     Final diagnoses:   Headache associated with sexual activity     Condition: condition: fair  Dispo: Discharge to home    Follow up with [facility] neurology,  Start topomax 25 BID [facility]      This transcription was electronically signed. Parts of this transcriptions may have been dictated by use of voice recognition software and electronically transcribed, and parts may have been transcribed with the assistance of an ED scribe. The transcription may contain errors not detected in proofreading. Please refer to my supervising physician's documentation if my documentation differs.     Electronically Signed: Alice Villegas MD, 07/21/22, 11:31 PM        Alice Villegas MD  Resident  07/21/22 [address] Bernie MD  Resident  07/21/22 6298

## 2022-07-21 NOTE — ED NOTES
Pt given toradol at this time and requests for IV to be taken out after administration of Toradol.       Alexandro Santos RN  07/20/22 3813

## 2022-07-21 NOTE — ED PROVIDER NOTES
discussed and agreed upon by patient. This transcription was electronically signed. It was dictated by use of voice recognition software and electronically transcribed. The transcription may contain errors not detected in proofreading.      I performed direct supervision and was present for the critical portion following procedures: None  Critical care time on this case: None    Electronically signed by Erum Spangler MD on 7/21/22 at 7:51 PM EDT       Erum Spangler MD  07/21/22 1958

## 2022-07-28 ENCOUNTER — HOSPITAL ENCOUNTER (OUTPATIENT)
Dept: PHYSICAL THERAPY | Age: 42
Setting detail: THERAPIES SERIES
Discharge: HOME | End: 2022-07-28
Payer: COMMERCIAL

## 2022-07-28 PROCEDURE — 97161 PT EVAL LOW COMPLEX 20 MIN: CPT

## 2022-07-28 NOTE — PROGRESS NOTES
** PLEASE SIGN, DATE AND TIME CERTIFICATION BELOW AND RETURN TO [facility] OUTPATIENT REHABILITATION (FAX #: [phone]). ATTEST/CO-SIGN IF ACCESSING VIA [employer]. THANK YOU.**    I certify that I have examined the patient below and determined that Physical Medicine and Rehabilitation service is necessary and that I approve the established plan of care for up to 90 days or as specifically noted. Attestation, signature or co-signature of physician indicates approval of certification requirements.    ________________________ ____________ __________  Physician Signature   Date   Time  [address] [facility]  PHYSICAL THERAPY  [x] EVALUATION  [] DAILY NOTE (LAND) [] DAILY NOTE (AQUATIC ) [] PROGRESS NOTE [] DISCHARGE NOTE    [x] [facility]   [] [facility] 90    [] [address] [facility]   [] Aris Shearing    Date: 2022  Patient Name:  Delfin Delong  : 1980  MRN: [id number]  CSN: [id number]    Referring Practitioner Michael Stoddard MD   Diagnosis Radiculopathy, lumbar region [M54.16]  Other low back pain [M54.59]  Spondylolisthesis, lumbar region [M43.16]    Treatment Diagnosis Chronic lumbar pain, core and hip weakness   Date of Evaluation 22    Additional Pertinent History Fibromyalgia, osteopenia in spine. Left shoulder injury 2022. Functional Outcome Measure Used Modified Oswestry   Functional Outcome Score 27 (22)       Insurance: Primary: Payor: [address] [place] [address] [address] /  /  / ,   Secondary:    Authorization Information: PRECERTIFICATION REQUIRED: No  INSURANCE THERAPY BENEFIT: No pre-certification is needed. PT, OT and ST are allowed 30 visits each per calendar year. AQUATIC THERAPY COVERED:  Yes   MODALITIES COVERED:  Yes--Iontophoresis is not covered. Hot/Cold Packs are not covered.   TELEHEALTH COVERED:  Yes   Visit # 1, 1/10 for progress note   Visits Allowed: 30   Recertification Date:    Physician Follow-Up: Upon completion of PT   Physician Orders:    History of Present Illness: Pt presents with chronic back issues since she was 7years old when she fractured her tailbone. Pain is progressively worsening with age. Pt was told she needed a fusion but had hysterectomy first which did help. Over the past 10 years she had had intermittent episodes of pain. Pt was lifting and doing things she probably shouldn't be doing and felt back slip and can't seem to get it back in place. 6/14/22, CT scan showed stage 1 retrolithesis. Pt has had PT, epidurals and seen surgeons in past. Diagnostic testing showed degenerative changes in spine. Pt notes she has had recommendation to have breast reduction. Pt developed migraines recently and has been to ED and was prescribed Topomax. Pt had been doing cannibus and holistic treatment but now is back on prescribed medication. Pt has had multiple steroid injections and been on steroid packs. Pt notes heel spurs that affect walking. Pt wants to avoid back surgery. Pt may need left bicep surgery . Pain limits sitting and standing tolerance and unable to work. Pt unable to lift without pain. 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 Pt does yoga and tries to stretch. Pt notes core has been week since having her son 18 years ago. Pt has to complete PT to get MRI approved by insurance. SUBJECTIVE: Pt has intermittent right leg numbness and causing drop foot and led to fall. .   Social/Functional History and Current Status:  Medications and Allergies have been reviewed and are listed on Medical History Questionnaire. Francis Johnson lives  2 kids (13and 25year old)  in a multiple floor home with 5 stairs and a handrail to enter. Bedroom on 2nd floor. Basement.      Task Previous Current   ADLs  Independent Modified Independent   IADL's Independent Modified Independent   Ambulation Independent -    DK - -    Slump      SI Compression/Distraction      MIK - -    [address] [place] [address]          TREATMENT   Precautions:    Pain: 8/10 low back    \"X in shaded column indicates activity completed today    *\" next to exercise/intervention indicates progression   Modalities Parameters/  Location  Notes                     Manual Therapy Time/Technique  Notes                     Exercise/Intervention   Notes                                                                                  Specific Interventions Next Treatment: aquatics for strengthening, stretches as needed, pain management; land to include manual/massage, dry needling, mechanical traction, modalities, Astym and core strengthening for pain control    Activity/Treatment Tolerance:  [x]  Patient tolerated treatment well  []  Patient limited by fatigue  []  Patient limited by pain   []  Patient limited by medical complications  []  Other:     Assessment: 40year old presents with chronic lumbar pain but flared up after overuse causing stage 1 spondylolithesis. Pt also battles fibromyalgia. Pt demos good flexibility and ROM, as she is active with yoga and stretching at home; however right hamstring is tighter than left. Pt demos core and hip weakness, especially hip extension. Pt agreeable to alternating land and pool therapy. Pt trying to avoid back surgery and prefers conservative treatment. Pt will benefit from skilled PT to address listed impairments to address core weakness/instability and decrease pain. Body Structures/Functions/Activity Limitations: impaired ROM, impaired strength, pain, abnormal gait, and abnormal posture  Prognosis: good      Goals    Patient Goal: Manage pain, avoid surgery if able    Short Term Goals: 6 weeks  Patient will hold bilateral SLR x30 seconds with <5/10 pain to improve core engagement with lifting and household chores.    Patient will have equal hamstring flexibility bilaterally to decrease muscular restriction for pain control. Patient will improve bilateral hip extension strength to 4+/5 for ease walking and climbing stairs. Patient will tolerate walking 60 minutes at grocery store with < 5/10 pain. Long Term Goals: 8 weeks  Patient will be independent and compliant with HEP to achieve above goals. Patient Education:   [x]  HEP/Education Completed: Plan of Care, Goals, pool guidelines  [facility] Access Code:  []  No new Education completed  []  Reviewed Prior HEP      [x]  Patient verbalized and/or demonstrated understanding of education provided. []  Patient unable to verbalize and/or demonstrate understanding of education provided. Will continue education. []  Barriers to learning:     PLAN:  Treatment Recommendations: Strengthening, Range of Motion, Functional Mobility Training, Gait Training, Stair Training, Manual Therapy - Soft Tissue Mobilization, Pain Management, Home Exercise Program, Patient Education, Integrative Dry Needling, Aquatics, and Modalities    [x]  Plan of care initiated. Plan to see patient 2 times per week for 8 weeks to address the treatment planned outlined above.   []  Continue with current plan of care  []  Modify plan of care as follows:    []  Hold pending physician visit  []  Discharge    Time In 0700   Time Out 0733   Timed Code Minutes: 0 min   Total Treatment Time: 33 min     Electronically Signed by: Carmen Morales PT

## 2022-08-01 ENCOUNTER — HOSPITAL ENCOUNTER (OUTPATIENT)
Dept: PHYSICAL THERAPY | Age: 42
Setting detail: THERAPIES SERIES
Discharge: HOME | End: 2022-08-01
Payer: COMMERCIAL

## 2022-08-01 PROCEDURE — 97113 AQUATIC THERAPY/EXERCISES: CPT

## 2022-08-01 NOTE — PROGRESS NOTES
[address] [facility]  PHYSICAL THERAPY  [] EVALUATION  [] DAILY NOTE (LAND) [x] DAILY NOTE (AQUATIC ) [] PROGRESS NOTE [] DISCHARGE NOTE    [x] [facility] [place]   [] [place] [address]    [] [facility]   [] [employer] Payment    Date: 2022  Patient Name:  Anna Zaman  : 1980  MRN: [id number]  CSN: [id number]    Referring Practitioner William Hariston MD   Diagnosis Radiculopathy, lumbar region [M54.16]  Other low back pain [M54.59]  Spondylolisthesis, lumbar region [M43.16]    Treatment Diagnosis Chronic lumbar pain, core and hip weakness   Date of Evaluation 22    Additional Pertinent History Fibromyalgia, osteopenia in spine. Left shoulder injury 2022. Functional Outcome Measure Used Modified Oswestry   Functional Outcome Score 27 (22)       Insurance: Primary: Payor: [address] [place] [address]  [address] /  /  / ,   Secondary:    Authorization Information: PRECERTIFICATION REQUIRED: No  INSURANCE THERAPY BENEFIT: No pre-certification is needed. PT, OT and ST are allowed 30 visits each per calendar year. AQUATIC THERAPY COVERED:  Yes   MODALITIES COVERED:  Yes--Iontophoresis is not covered. Hot/Cold Packs are not covered. TELEHEALTH COVERED:  Yes   Visit # 2, 2/10 for progress note   Visits Allowed: 30   Recertification Date: 97   Physician Follow-Up: Upon completion of PT   Physician Orders:    History of Present Illness: Pt presents with chronic back issues since she was 7years old when she fractured her tailbone. Pain is progressively worsening with age. Pt was told she needed a fusion but had hysterectomy first which did help. Over the past 10 years she had had intermittent episodes of pain. Pt was lifting and doing things she probably shouldn't be doing and felt back slip and can't seem to get it back in place. 22, CT scan showed stage 1 retrolithesis.  Pt has had PT, epidurals and seen surgeons in past. Diagnostic testing showed degenerative changes in spine. Pt notes she has had recommendation to have breast reduction. Pt developed migraines recently and has been to ED and was prescribed Topomax. Pt had been doing cannibus and holistic treatment but now is back on prescribed medication. Pt has had multiple steroid injections and been on steroid packs. Pt notes heel spurs that affect walking. Pt wants to avoid back surgery. Pt may need left bicep surgery . Pain limits sitting and standing tolerance and unable to work. Pt unable to lift without pain. 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 Pt does yoga and tries to stretch. Pt notes core has been week since having her son 18 years ago. Pt has to complete PT to get MRI approved by insurance. SUBJECTIVE: Pt reports she is better today as she had fibromyalgia flare up the past 2 days. Pt notes she laid in bed all day yesterday.     OBJECTIVE:   AQUATICS TREATMENT   Precautions:    Pain: 5/10 low back    X in shaded column indicates activity completed today   Exercise/Intervention Sets/Sec  Notes   Walk Forward 2 laps  x    Walk Backward 2 laps  x    Walk Sideways 2 laps  x           Lower Extremity Exercises:       Heel/Toe Raises 10  x    Marches 10  x    Squats 10  x    3 Way Hip 10  x    Hamstring Curls 10  x    Lunges       Step-Ups              Lower Extremity Stretches:              Seated Exercises:              Upper Extremity Exercises:       Shoulder Flexion       Shoulder ABD/ADD       Shoulder Horizontal ABD/ADD       Shoulder IR/ER       Shoulder Circles       Shoulder Shrugs       Rows       Bicep Curls              Upper Extremity Stretches:              Balance:              Dynamic Gait:              Deep Water:       Hang 3 min  x Worsened pain   Bicycle 30 sec  x    Hip ABD/ADD       Hip Flex/Ext         Specific Interventions Next Treatment: aquatics for strengthening, stretches as needed, pain management; land to include manual/massage, dry needling, NO mechanical traction, modalities, Astym and core strengthening for pain control    Activity/Treatment Tolerance:  [x]  Patient tolerated treatment well  []  Patient limited by fatigue  []  Patient limited by pain   []  Patient limited by medical complications  []  Other:     Assessment: Initiated aquatic therapy for core strengthening. Attempted deep water exercises but flared up pain. Pt further notes upon exiting pool that she was instructed to never do inversion table or traction as her body doesn't realign well afterwards. Shortened session d/t patient having to complete registration prior to getting into pool. Goals    Patient Goal: Manage pain, avoid surgery if able    Short Term Goals: 6 weeks  Patient will hold bilateral SLR x30 seconds with <5/10 pain to improve core engagement with lifting and household chores. Patient will have equal hamstring flexibility bilaterally to decrease muscular restriction for pain control. Patient will improve bilateral hip extension strength to 4+/5 for ease walking and climbing stairs. Patient will tolerate walking 60 minutes at grocery store with < 5/10 pain. Long Term Goals: 8 weeks  Patient will be independent and compliant with HEP to achieve above goals. Patient Education:   [x]  HEP/Education Completed: monitor response to pool session  [address] Access Code:  []  No new Education completed  []  Reviewed Prior HEP      [x]  Patient verbalized and/or demonstrated understanding of education provided. []  Patient unable to verbalize and/or demonstrate understanding of education provided. Will continue education.   []  Barriers to learning:     PLAN:  Treatment Recommendations: Strengthening, Range of Motion, Functional Mobility Training, Gait Training, Stair Training, Manual Therapy - Soft Tissue Mobilization, Pain Management, Home Exercise Program, Patient Education, Integrative Dry Needling, Aquatics, and Modalities    []  Plan of care initiated. Plan to see patient 2 times per week for 8 weeks to address the treatment planned outlined above.   [x]  Continue with current plan of care  []  Modify plan of care as follows:    []  Hold pending physician visit  []  Discharge    Time In 1030   Time Out 1100   Timed Code Minutes: 30 min   Total Treatment Time: 30 min     Electronically Signed by: Amaris Tsang PT

## 2022-08-04 ENCOUNTER — HOSPITAL ENCOUNTER (OUTPATIENT)
Dept: PHYSICAL THERAPY | Age: 42
Setting detail: THERAPIES SERIES
Discharge: HOME | End: 2022-08-04
Payer: COMMERCIAL

## 2022-08-04 PROCEDURE — 97110 THERAPEUTIC EXERCISES: CPT

## 2022-08-04 NOTE — PROGRESS NOTES
[address] [facility]  PHYSICAL THERAPY  [] EVALUATION  [x] DAILY NOTE (LAND) [] DAILY NOTE (AQUATIC ) [] PROGRESS NOTE [] DISCHARGE NOTE    [x] [facility] [place]   [] [place] [address]    [] [facility]   [] Adilson Pimentelt    Date: 2022  Patient Name:  Reyes Model  : 1980  MRN: [id number]  CSN: [id number]    Referring Practitioner Heydi Esqueda MD   Diagnosis Radiculopathy, lumbar region [M54.16]  Other low back pain [M54.59]  Spondylolisthesis, lumbar region [M43.16]    Treatment Diagnosis Chronic lumbar pain, core and hip weakness   Date of Evaluation 22    Additional Pertinent History Fibromyalgia, osteopenia in spine. Left shoulder injury 2022. Functional Outcome Measure Used Modified Oswestry   Functional Outcome Score 27/50  (22)       Insurance: Primary: Payor: [address] [place] [address]  [address] /  /  / ,   Secondary:    Authorization Information: PRECERTIFICATION REQUIRED: No  INSURANCE THERAPY BENEFIT: No pre-certification is needed. PT, OT and ST are allowed 30 visits each per calendar year. AQUATIC THERAPY COVERED:  Yes   MODALITIES COVERED:  Yes--Iontophoresis is not covered. Hot/Cold Packs are not covered. TELEHEALTH COVERED:  Yes   Visit # 3, 3/10 for progress note   Visits Allowed: 30   Recertification Date:    Physician Follow-Up: Upon completion of PT   Physician Orders:    History of Present Illness: Pt presents with chronic back issues since she was 7years old when she fractured her tailbone. Pain is progressively worsening with age. Pt was told she needed a fusion but had hysterectomy first which did help. Over the past 10 years she had had intermittent episodes of pain. Pt was lifting and doing things she probably shouldn't be doing and felt back slip and can't seem to get it back in place. 22, CT scan showed stage 1 retrolithesis.  Pt has had PT, epidurals and seen surgeons in past. Diagnostic testing showed degenerative changes in spine. Pt notes she has had recommendation to have breast reduction. Pt developed migraines recently and has been to ED and was prescribed Topomax. Pt had been doing cannibus and holistic treatment but now is back on prescribed medication. Pt has had multiple steroid injections and been on steroid packs. Pt notes heel spurs that affect walking. Pt wants to avoid back surgery. Pt may need left bicep surgery . Pain limits sitting and standing tolerance and unable to work. Pt unable to lift without pain. 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 Pt does yoga and tries to stretch. Pt notes core has been week since having her son 18 years ago. Pt has to complete PT to get MRI approved by insurance. SUBJECTIVE: Patient feels instability, shifting and dropping thud sensation in her lower lumbar. She can tell when it slips out of alignment and she has to lay down and rest. She is sore this morning from rainy weather. The pool session helped her move more easily but she wants to improve her strengthening.      OBJECTIVE:   AQUATICS TREATMENT   Precautions:    Pain: 5/10 low back    X in shaded column indicates activity completed today   Exercise/Intervention Sets/Sec  Notes   Walk Forward 2 laps      Walk Backward 2 laps      Walk Sideways 2 laps             Lower Extremity Exercises:       Heel/Toe Raises 10      Marches 10      Squats 10      3 Way Hip 10      Hamstring Curls 10      Lunges       Step-Ups              Lower Extremity Stretches:              Seated Exercises:              Upper Extremity Exercises:       Shoulder Flexion       Shoulder ABD/ADD       Shoulder Horizontal ABD/ADD       Shoulder IR/ER       Shoulder Circles       Shoulder Shrugs       Rows       Bicep Curls              Upper Extremity Stretches:              Balance:              Dynamic Gait:              Deep Water: Hang 3 min   Worsened pain   Bicycle 30 sec      Hip ABD/ADD       Hip Flex/Ext           OBJECTIVE - Land sessions:  Log roll technique in/out of bed x2 reps  Dry needling manual therapy: consisted of the placement of 30-55 mm needles in the following areas: Paravertebral points T12, L2, L4, L5, S1, S3, Homeostatic points Inferior gluteal points, superior cluneal points bilaterally. Needles were inserted, pistoned, rotated, and/or coned to produce intramuscular mobilization. These techniques were used to restore functional range of motion, reduce muscle spasm and induce healing in the corresponding musculature. (60022)     Clean Technique was utilized today while applying Dry needling treatment. The treatment sites were cleaned with 70% solution of isopropyl alcohol. The therapist washed/sanitized their hands and utilized treatment gloves prior to inserting the needles. All needles were removed and discarded in the appropriate sharps container. \"      Dry Needling Safety Questions Response   Are you currently receiving any form of cancer treatment? No   Do you have any form of a bleeding disorder? No   Have you ever fainted or experienced a seizure? No   Do you have a pacemaker or any other electrical implant? No   Are you currently taking any anticoagulants? No   Are you currently taking antibiotics for an infection? No   Do you have a damaged heart valve, metal prosthesis, or other risk of infection? No   Are you pregnant or actively trying for a pregnancy? No   Do you have breast implants? No   Do you suffer from metal allergies? No   Are you diabetic or do you suffer from impaired wound healing? No   Do you have hepatitis B, hepatitis C, HIV, or any other infectious disease? No   Have you eaten in the last two hours?  No     Specific Interventions Next Treatment: aquatics for strengthening, stretches as needed, pain management; land to include manual/massage, dry needling, NO mechanical traction, week for 8 weeks to address the treatment planned outlined above.   [x]  Continue with current plan of care  []  Modify plan of care as follows:    []  Hold pending physician visit  []  Discharge    Time In 1000   Time Out 1030   Timed Code Minutes: 15   Total Treatment Time: 30     Electronically Signed by: Segun Ray, PT

## 2022-08-08 ENCOUNTER — HOSPITAL ENCOUNTER (OUTPATIENT)
Dept: PHYSICAL THERAPY | Age: 42
Setting detail: THERAPIES SERIES
Discharge: HOME | End: 2022-08-08
Payer: COMMERCIAL

## 2022-08-08 PROCEDURE — 97113 AQUATIC THERAPY/EXERCISES: CPT

## 2022-08-08 NOTE — PROGRESS NOTES
[facility]  [] EVALUATION  [] DAILY NOTE (LAND) [x] DAILY NOTE (AQUATIC ) [] PROGRESS NOTE [] DISCHARGE NOTE    [x] [facility] [place]   [] [place] [address]    [] [facility]   [] Ketan Lorenzana    Date: 2022  Patient Name:  Bhupinder Dawson  : 1980  MRN: [id number]  CSN: [id number]    Referring Practitioner Kenyatta Holden MD   Diagnosis Radiculopathy, lumbar region [M54.16]  Other low back pain [M54.59]  Spondylolisthesis, lumbar region [M43.16]    Treatment Diagnosis Chronic lumbar pain, core and hip weakness   Date of Evaluation 22    Additional Pertinent History Fibromyalgia, osteopenia in spine. Left shoulder injury 2022. Functional Outcome Measure Used Modified Oswestry   Functional Outcome Score 27/50  (22)       Insurance: Primary: Payor: [address] [place] [address]  [address] /  /  / ,   Secondary:    Authorization Information: PRECERTIFICATION REQUIRED: No  INSURANCE THERAPY BENEFIT: No pre-certification is needed. PT, OT and ST are allowed 30 visits each per calendar year. AQUATIC THERAPY COVERED:  Yes   MODALITIES COVERED:  Yes--Iontophoresis is not covered. Hot/Cold Packs are not covered. TELEHEALTH COVERED:  Yes   Visit # 4, 4/10 for progress note   Visits Allowed: 30   Recertification Date:    Physician Follow-Up: Upon completion of PT   Physician Orders:    History of Present Illness: Pt presents with chronic back issues since she was 7years old when she fractured her tailbone. Pain is progressively worsening with age. Pt was told she needed a fusion but had hysterectomy first which did help. Over the past 10 years she had had intermittent episodes of pain. Pt was lifting and doing things she probably shouldn't be doing and felt back slip and can't seem to get it back in place. 22, CT scan showed stage 1 retrolithesis.  Pt has had PT, epidurals and seen surgeons in past. Diagnostic testing showed degenerative changes in spine. Pt notes she has had recommendation to have breast reduction. Pt developed migraines recently and has been to ED and was prescribed Topomax. Pt had been doing cannibus and holistic treatment but now is back on prescribed medication. Pt has had multiple steroid injections and been on steroid packs. Pt notes heel spurs that affect walking. Pt wants to avoid back surgery. Pt may need left bicep surgery . Pain limits sitting and standing tolerance and unable to work. Pt unable to lift without pain. 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 Pt does yoga and tries to stretch. Pt notes core has been week since having her son 18 years ago. Pt has to complete PT to get MRI approved by insurance. SUBJECTIVE: Patient reports she feels better today but yesterday was flared up from activity she did on Saturday. Pt had instant relief after dry needling but did have soreness later that day and next day, but did stay hydrated. Pt has been stretching and up doing things already this morning. Pt has to drive friend to [employer] later today. Pt reports feeling shift in back after deep water hang last pool session. Pt notes right sciatica is flared up today.      OBJECTIVE:   AQUATICS TREATMENT   Precautions:    Pain: 4/10 low back    X in shaded column indicates activity completed today   Exercise/Intervention Sets/Sec  Notes   Walk Forward 2 laps  x    Walk Backward 2 laps  x    Walk Sideways 2 laps  x           Lower Extremity Exercises:       Heel/Toe Raises 15*  x    Marches 15*  x    Squats 10  x    3 Way Hip 10  x    Hamstring Curls 10  x    Lunges       Step-Ups              Lower Extremity Stretches:       Long sitting hamstring* 2x20 sec  x           Seated Exercises:              Upper Extremity Exercises:       Shoulder Flexion to Extension* 15  x    Shoulder ABD/ADD* 15  x    Shoulder education provided. []  Patient unable to verbalize and/or demonstrate understanding of education provided. Will continue education. []  Barriers to learning:     PLAN:  Treatment Recommendations: Strengthening, Range of Motion, Functional Mobility Training, Gait Training, Stair Training, Manual Therapy - Soft Tissue Mobilization, Pain Management, Home Exercise Program, Patient Education, Integrative Dry Needling, Aquatics, and Modalities    []  Plan of care initiated. Plan to see patient 2 times per week for 8 weeks to address the treatment planned outlined above.   [x]  Continue with current plan of care  []  Modify plan of care as follows:    []  Hold pending physician visit  []  Discharge    Time In 1030   Time Out 1115   Timed Code Minutes: 45   Total Treatment Time: 45     Electronically Signed by: Stephie Lopez PT

## 2022-08-11 ENCOUNTER — HOSPITAL ENCOUNTER (OUTPATIENT)
Dept: PHYSICAL THERAPY | Age: 42
Setting detail: THERAPIES SERIES
Discharge: HOME | End: 2022-08-11
Payer: COMMERCIAL

## 2022-08-11 PROCEDURE — 97110 THERAPEUTIC EXERCISES: CPT

## 2022-08-11 NOTE — PROGRESS NOTES
[facility]  PHYSICAL THERAPY  [] EVALUATION  [x] DAILY NOTE (LAND) [] DAILY NOTE (AQUATIC ) [] PROGRESS NOTE [] DISCHARGE NOTE    [x] [facility] [place]   [] [place] [address]    [] [facility]   [] Far[employer] Payment    Date: 2022  Patient Name:  Anna Zaman  : 1980  MRN: [id number]  CSN: [id number]    Referring Practitioner William Hairston MD   Diagnosis Radiculopathy, lumbar region [M54.16]  Other low back pain [M54.59]  Spondylolisthesis, lumbar region [M43.16]    Treatment Diagnosis Chronic lumbar pain, core and hip weakness   Date of Evaluation 22    Additional Pertinent History Fibromyalgia, osteopenia in spine. Left shoulder injury 2022. Functional Outcome Measure Used Modified Oswestry   Functional Outcome Score 27/50  (22)       Insurance: Primary: Payor: [address] [place] [address]  [address] /  /  / ,   Secondary:    Authorization Information: PRECERTIFICATION REQUIRED: No  INSURANCE THERAPY BENEFIT: No pre-certification is needed. PT, OT and ST are allowed 30 visits each per calendar year. AQUATIC THERAPY COVERED:  Yes   MODALITIES COVERED:  Yes--Iontophoresis is not covered. Hot/Cold Packs are not covered. TELEHEALTH COVERED:  Yes   Visit # 5, 5/10 for progress note   Visits Allowed: 30   Recertification Date:    Physician Follow-Up: Upon completion of PT   Physician Orders:    History of Present Illness: Pt presents with chronic back issues since she was 7years old when she fractured her tailbone. Pain is progressively worsening with age. Pt was told she needed a fusion but had hysterectomy first which did help. Over the past 10 years she had had intermittent episodes of pain. Pt was lifting and doing things she probably shouldn't be doing and felt back slip and can't seem to get it back in place. 22, CT scan showed stage 1 retrolithesis.  Pt has had PT, epidurals and seen surgeons in past. Diagnostic testing showed degenerative changes in spine. Pt notes she has had recommendation to have breast reduction. Pt developed migraines recently and has been to ED and was prescribed Topomax. Pt had been doing cannibus and holistic treatment but now is back on prescribed medication. Pt has had multiple steroid injections and been on steroid packs. Pt notes heel spurs that affect walking. Pt wants to avoid back surgery. Pt may need left bicep surgery . Pain limits sitting and standing tolerance and unable to work. Pt unable to lift without pain. 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 Pt does yoga and tries to stretch. Pt notes core has been week since having her son 18 years ago. Pt has to complete PT to get MRI approved by insurance. SUBJECTIVE: Patient felt dry needling gave immediate pain relief after last time, and she drank water to prevent soreness. She did have tenderness in needling regions that evening and the next day, but was able to tolerate pool therapy earlier this week.      OBJECTIVE:   TREATMENT   Precautions:     Pain: 8/10 low back     \"X in shaded column indicates activity completed today     *\" next to exercise/intervention indicates progression   Modalities Parameters/  Location   Notes   Dry Needlin mm paravertebral points T12, L2, L4, L5, S1, S3   x  10 mins lumbar needling    Dry Needlin mm inferior gluteal point, superior cluneal points bilateral   x      DN 30 mm: cervical paravertebrals C7-C2, and suboccipital points 5 mins x     Manual Therapy Time/Technique   Notes                                 Exercise/Intervention     Notes   Hooklying - small towel folded under lumbar for proprioception   x    Posterior pelvic tilt  10x 5 sec x    PPT + march 10x  x                                                                       Dry needling manual therapy: Needles were inserted, pistoned, rotated, and/or coned to produce intramuscular mobilization. These techniques were used to restore functional range of motion, reduce muscle spasm and induce healing in the corresponding musculature. (51114)     Clean Technique was utilized today while applying Dry needling treatment. The treatment sites were cleaned with 70% solution of isopropyl alcohol. The therapist washed/sanitized their hands and utilized treatment gloves prior to inserting the needles. All needles were removed and discarded in the appropriate sharps container. \"        Specific Interventions Next Treatment: aquatics for strengthening, stretches as needed, pain management; land to include manual/massage, dry needling, NO mechanical traction, modalities, Astym and core strengthening for pain control    Activity/Treatment Tolerance:  [x]  Patient tolerated treatment well  []  Patient limited by fatigue  []  Patient limited by pain   []  Patient limited by medical complications  []  Other:     Assessment: Continued dry needling today with additional points in the piriformis, R buttock, and left hip/ischium. Patient also asking for needles of the neck/headache so trialed this. Denies increased pain but felt popping sensation in the neck during twitch response. Followed with lumbar stabilizations with TA activation. Goals    Patient Goal: Manage pain, avoid surgery if able    Short Term Goals: 6 weeks  Patient will hold bilateral SLR x30 seconds with <5/10 pain to improve core engagement with lifting and household chores. Patient will have equal hamstring flexibility bilaterally to decrease muscular restriction for pain control. Patient will improve bilateral hip extension strength to 4+/5 for ease walking and climbing stairs. Patient will tolerate walking 60 minutes at grocery store with < 5/10 pain. Long Term Goals: 8 weeks  Patient will be independent and compliant with HEP to achieve above goals.        Patient Education:   [] HEP/Education Completed: dry needling safety questions reviewed. Advised to monitor relief after needling, drink water, stretch gently  Medbridge Access Code:  []  No new Education completed  [x]  Reviewed Prior HEP      [x]  Patient verbalized and/or demonstrated understanding of education provided. []  Patient unable to verbalize and/or demonstrate understanding of education provided. Will continue education. []  Barriers to learning:     PLAN:  Treatment Recommendations: Strengthening, Range of Motion, Functional Mobility Training, Gait Training, Stair Training, Manual Therapy - Soft Tissue Mobilization, Pain Management, Home Exercise Program, Patient Education, Integrative Dry Needling, Aquatics, and Modalities    []  Plan of care initiated. Plan to see patient 2 times per week for 8 weeks to address the treatment planned outlined above.   [x]  Continue with current plan of care  []  Modify plan of care as follows:    []  Hold pending physician visit  []  Discharge    Time In 1006   Time Out 1042   Timed Code Minutes: 21   Total Treatment Time: 36     Electronically Signed by: Karli Rothman, PT

## 2022-08-15 ENCOUNTER — HOSPITAL ENCOUNTER (OUTPATIENT)
Dept: PHYSICAL THERAPY | Age: 42
Setting detail: THERAPIES SERIES
Discharge: HOME | End: 2022-08-15
Payer: COMMERCIAL

## 2022-08-15 PROCEDURE — 97113 AQUATIC THERAPY/EXERCISES: CPT

## 2022-08-15 NOTE — PROGRESS NOTES
[address] [facility]  PHYSICAL THERAPY  [] EVALUATION  [] DAILY NOTE (LAND) [x] DAILY NOTE (AQUATIC ) [] PROGRESS NOTE [] DISCHARGE NOTE    [x] [facility] [place]   [] [place] [address]    [] [facility]   [] Farzana Payment    Date: 8/15/2022  Patient Name:  Anna Zaman  : 1980  MRN: [id number]  CSN: [id number]    Referring Practitioner William Hairston MD   Diagnosis Radiculopathy, lumbar region [M54.16]  Other low back pain [M54.59]  Spondylolisthesis, lumbar region [M43.16]    Treatment Diagnosis Chronic lumbar pain, core and hip weakness   Date of Evaluation 22    Additional Pertinent History Fibromyalgia, osteopenia in spine. Left shoulder injury 2022. Functional Outcome Measure Used Modified Oswestry   Functional Outcome Score 27/50  (22)       Insurance: Primary: Payor: [address] [place] [address]  [address] /  /  / ,   Secondary:    Authorization Information: PRECERTIFICATION REQUIRED: No  INSURANCE THERAPY BENEFIT: No pre-certification is needed. PT, OT and ST are allowed 30 visits each per calendar year. AQUATIC THERAPY COVERED:  Yes   MODALITIES COVERED:  Yes--Iontophoresis is not covered. Hot/Cold Packs are not covered. TELEHEALTH COVERED:  Yes   Visit # 6, 6/10 for progress note   Visits Allowed: 30   Recertification Date:    Physician Follow-Up: Upon completion of PT   Physician Orders:    History of Present Illness: Pt presents with chronic back issues since she was 7years old when she fractured her tailbone. Pain is progressively worsening with age. Pt was told she needed a fusion but had hysterectomy first which did help. Over the past 10 years she had had intermittent episodes of pain. Pt was lifting and doing things she probably shouldn't be doing and felt back slip and can't seem to get it back in place. 22, CT scan showed stage 1 retrolithesis.  Pt has had PT, epidurals and seen surgeons in past. Balance:              Dynamic Gait:              Deep Water:       Hang 3 min   Worsened pain   Bicycle 30 sec      Hip ABD/ADD       Hip Flex/Ext           Specific Interventions Next Treatment: aquatics for strengthening, stretches as needed, pain management; NO DEEP WATER; land to include manual/massage, dry needling, NO mechanical traction, modalities, Astym and core strengthening for pain control    Activity/Treatment Tolerance:  [x]  Patient tolerated treatment well  []  Patient limited by fatigue  []  Patient limited by pain   []  Patient limited by medical complications  []  Other:     Assessment: Multiple progressions as noted above. Pt very talkative during session. Discussed pain management approach to consider if symptoms persist to avoid surgery. Pt requires cues for core engagement and smaller range kicks but prefers to do larger kicks to stretch. Goals    Patient Goal: Manage pain, avoid surgery if able    Short Term Goals: 6 weeks  Patient will hold bilateral SLR x30 seconds with <5/10 pain to improve core engagement with lifting and household chores. Patient will have equal hamstring flexibility bilaterally to decrease muscular restriction for pain control. Patient will improve bilateral hip extension strength to 4+/5 for ease walking and climbing stairs. Patient will tolerate walking 60 minutes at grocery store with < 5/10 pain. Long Term Goals: 8 weeks  Patient will be independent and compliant with HEP to achieve above goals. Patient Education:   []  HEP/Education Completed: dry needling safety questions reviewed. Advised to monitor relief after needling, drink water, stretch gently  Medbridge Access Code:  [x]  No new Education completed  []  Reviewed Prior HEP      []  Patient verbalized and/or demonstrated understanding of education provided. []  Patient unable to verbalize and/or demonstrate understanding of education provided. Will continue education.   []  Barriers to learning:     PLAN:  Treatment Recommendations: Strengthening, Range of Motion, Functional Mobility Training, Gait Training, Stair Training, Manual Therapy - Soft Tissue Mobilization, Pain Management, Home Exercise Program, Patient Education, Integrative Dry Needling, Aquatics, and Modalities    []  Plan of care initiated. Plan to see patient 2 times per week for 8 weeks to address the treatment planned outlined above.   [x]  Continue with current plan of care  []  Modify plan of care as follows:    []  Hold pending physician visit  []  Discharge    Time In 1020   Time Out 1100   Timed Code Minutes: 40   Total Treatment Time: 40     Electronically Signed by: Albaro Shirley, PT

## 2022-08-18 ENCOUNTER — HOSPITAL ENCOUNTER (OUTPATIENT)
Dept: PHYSICAL THERAPY | Age: 42
Setting detail: THERAPIES SERIES
End: 2022-08-18
Payer: COMMERCIAL

## 2022-08-22 ENCOUNTER — HOSPITAL ENCOUNTER (OUTPATIENT)
Dept: PHYSICAL THERAPY | Age: 42
Setting detail: THERAPIES SERIES
Discharge: HOME | End: 2022-08-22
Payer: COMMERCIAL

## 2022-08-22 PROCEDURE — 97113 AQUATIC THERAPY/EXERCISES: CPT

## 2022-08-22 NOTE — PROGRESS NOTES
[address] [facility]  PHYSICAL THERAPY  [] EVALUATION  [] DAILY NOTE (LAND) [x] DAILY NOTE (AQUATIC ) [] PROGRESS NOTE [] DISCHARGE NOTE    [x] [facility] [place]   [] [place] [address]    [] [facility]   [] Anshul End    Date: 2022  Patient Name:  Sarai Wong  : 1980  MRN: [id number]  CSN: [id number]    Referring Practitioner Los Mendez MD   Diagnosis Radiculopathy, lumbar region [M54.16]  Other low back pain [M54.59]  Spondylolisthesis, lumbar region [M43.16]    Treatment Diagnosis Chronic lumbar pain, core and hip weakness   Date of Evaluation 22    Additional Pertinent History Fibromyalgia, osteopenia in spine. Left shoulder injury 2022. Functional Outcome Measure Used Modified Oswestry   Functional Outcome Score 27/50  (22)       Insurance: Primary: Payor: [address] [place] [address]  [address] /  /  / ,   Secondary:    Authorization Information: PRECERTIFICATION REQUIRED: No  INSURANCE THERAPY BENEFIT: No pre-certification is needed. PT, OT and ST are allowed 30 visits each per calendar year. AQUATIC THERAPY COVERED:  Yes   MODALITIES COVERED:  Yes--Iontophoresis is not covered. Hot/Cold Packs are not covered. TELEHEALTH COVERED:  Yes   Visit # 7, 7/10 for progress note   Visits Allowed: 30   Recertification Date:    Physician Follow-Up: Upon completion of PT   Physician Orders:    History of Present Illness: Pt presents with chronic back issues since she was 7years old when she fractured her tailbone. Pain is progressively worsening with age. Pt was told she needed a fusion but had hysterectomy first which did help. Over the past 10 years she had had intermittent episodes of pain. Pt was lifting and doing things she probably shouldn't be doing and felt back slip and can't seem to get it back in place. 22, CT scan showed stage 1 retrolithesis.  Pt has had PT, epidurals and seen surgeons in past. Diagnostic testing showed degenerative changes in spine. Pt notes she has had recommendation to have breast reduction. Pt developed migraines recently and has been to ED and was prescribed Topomax. Pt had been doing cannibus and holistic treatment but now is back on prescribed medication. Pt has had multiple steroid injections and been on steroid packs. Pt notes heel spurs that affect walking. Pt wants to avoid back surgery. Pt may need left bicep surgery . Pain limits sitting and standing tolerance and unable to work. Pt unable to lift without pain. 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 Pt does yoga and tries to stretch. Pt notes core has been week since having her son 18 years ago. Pt has to complete PT to get MRI approved by insurance. SUBJECTIVE: Pt c/o left hip and left shoulder pain today. Pt has been driving more this week which may be aggravating hip. Pt has been cleaning tubs and such which aggravates her shoulder.      OBJECTIVE:   AQUATICS TREATMENT   Precautions:    Pain: 5-6/10     X in shaded column indicates activity completed today   Exercise/Intervention Sets/Sec  Notes   Walk Forward 2 laps  x    Walk Backward 2 laps  x    Walk Sideways 2 laps  x           Lower Extremity Exercises:       Heel/Toe Raises 15  x    Marches 15  x    Squats 15  x    3 Way Hip 15  x    Hamstring Curls 15  x    Lunges       Step-Ups              Lower Extremity Stretches:       Long sitting hamstring, SKTC 2x15 sec  x           Seated Exercises:              Upper Extremity Exercises:       Shoulder Flexion to Extension 15  x    Shoulder ABD/ADD 15  x    Shoulder Horizontal ABD/ADD 15  x    Shoulder IR/ER       Shoulder Circles       Shoulder Shrugs       Rows 15  x    Bicep Curls              Upper Extremity Stretches:              Balance:              Dynamic Gait:              Deep Water:       Hang 3 min   Worsened pain   Bicycle 30 Modalities    []  Plan of care initiated. Plan to see patient 2 times per week for 8 weeks to address the treatment planned outlined above.   [x]  Continue with current plan of care  []  Modify plan of care as follows:    []  Hold pending physician visit  []  Discharge    Time In 1447   Time Out 1517   Timed Code Minutes: 30   Total Treatment Time: 30     Electronically Signed by: Valentine Swain PT

## 2022-08-24 ENCOUNTER — HOSPITAL ENCOUNTER (OUTPATIENT)
Dept: PHYSICAL THERAPY | Age: 42
Setting detail: THERAPIES SERIES
Discharge: HOME | End: 2022-08-24
Payer: COMMERCIAL

## 2022-08-24 PROCEDURE — 97110 THERAPEUTIC EXERCISES: CPT

## 2022-08-24 NOTE — PROGRESS NOTES
[address] [facility]  PHYSICAL THERAPY  [] EVALUATION  [x] DAILY NOTE (LAND) [] DAILY NOTE (AQUATIC ) [] PROGRESS NOTE [] DISCHARGE NOTE    [x] [facility] [place]   [] [place] [address]    [] [facility]   [] [facility]    Date: 2022  Patient Name:  Bety Martines  : 1980  MRN: [id number]  CSN: [id number]    Referring Practitioner Lakshmi Agudelo MD   Diagnosis Radiculopathy, lumbar region [M54.16]  Other low back pain [M54.59]  Spondylolisthesis, lumbar region [M43.16]    Treatment Diagnosis Chronic lumbar pain, core and hip weakness   Date of Evaluation 22    Additional Pertinent History Fibromyalgia, osteopenia in spine. Left shoulder injury 2022. Functional Outcome Measure Used Modified Oswestry   Functional Outcome Score 27/50  (22)       Insurance: Primary: Payor: [address] [place] [address]  [address] /  /  / ,   Secondary:    Authorization Information: PRECERTIFICATION REQUIRED: No  INSURANCE THERAPY BENEFIT: No pre-certification is needed. PT, OT and ST are allowed 30 visits each per calendar year. AQUATIC THERAPY COVERED:  Yes   MODALITIES COVERED:  Yes--Iontophoresis is not covered. Hot/Cold Packs are not covered. TELEHEALTH COVERED:  Yes   Visit # 8, 10 for progress note   Visits Allowed: 30   Recertification Date:    Physician Follow-Up: Upon completion of PT   Physician Orders:    History of Present Illness: Pt presents with chronic back issues since she was 7years old when she fractured her tailbone. Pain is progressively worsening with age. Pt was told she needed a fusion but had hysterectomy first which did help. Over the past 10 years she had had intermittent episodes of pain. Pt was lifting and doing things she probably shouldn't be doing and felt back slip and can't seem to get it back in place. 22, CT scan showed stage 1 retrolithesis.  Pt has had PT, epidurals and seen surgeons in past. x    PPT + march 10x  x                                                                     Dry needling manual therapy: Needles were inserted, pistoned, rotated, and/or coned to produce intramuscular mobilization. These techniques were used to restore functional range of motion, reduce muscle spasm and induce healing in the corresponding musculature. (31777)     Clean Technique was utilized today while applying Dry needling treatment. The treatment sites were cleaned with 70% solution of isopropyl alcohol. The therapist washed/sanitized their hands and utilized treatment gloves prior to inserting the needles. All needles were removed and discarded in the appropriate sharps container. \"      Specific Interventions Next Treatment: aquatics for strengthening, stretches as needed, pain management; land to include manual/massage, dry needling, NO mechanical traction, modalities, Astym and core strengthening for pain control    Activity/Treatment Tolerance:  [x]  Patient tolerated treatment well  []  Patient limited by fatigue  []  Patient limited by pain   []  Patient limited by medical complications  []  Other:     Assessment: Third dry needling appointment today, patient felt more sore in left hip afterward. Advised to monitor soreness and continue HEP. Advised she may benefit from once per week dry needling sessions after she has mastered her home program and progressed her strength with other therapy sessions. Goals    Patient Goal: Manage pain, avoid surgery if able    Short Term Goals: 6 weeks  Patient will hold bilateral SLR x30 seconds with <5/10 pain to improve core engagement with lifting and household chores. Patient will have equal hamstring flexibility bilaterally to decrease muscular restriction for pain control. Patient will improve bilateral hip extension strength to 4+/5 for ease walking and climbing stairs. Patient will tolerate walking 60 minutes at grocery store with < 5/10 pain.      Long Term Goals: 8 weeks  Patient will be independent and compliant with HEP to achieve above goals. Patient Education:   []  HEP/Education Completed: dry needling safety questions reviewed. Advised to monitor relief after needling, drink water, stretch gently  Medbridge Access Code:  []  No new Education completed  [x]  Reviewed Prior HEP      [x]  Patient verbalized and/or demonstrated understanding of education provided. []  Patient unable to verbalize and/or demonstrate understanding of education provided. Will continue education. []  Barriers to learning:     PLAN:  Treatment Recommendations: Strengthening, Range of Motion, Functional Mobility Training, Gait Training, Stair Training, Manual Therapy - Soft Tissue Mobilization, Pain Management, Home Exercise Program, Patient Education, Integrative Dry Needling, Aquatics, and Modalities    []  Plan of care initiated. Plan to see patient 2 times per week for 8 weeks to address the treatment planned outlined above.   [x]  Continue with current plan of care  []  Modify plan of care as follows:    []  Hold pending physician visit  []  Discharge    Time In 1600   Time Out 1620   Timed Code Minutes: 10   Total Treatment Time: 20     Electronically Signed by: Pilar Medeiros, PT

## 2022-08-29 ENCOUNTER — HOSPITAL ENCOUNTER (OUTPATIENT)
Dept: PHYSICAL THERAPY | Age: 42
Setting detail: THERAPIES SERIES
Discharge: HOME | End: 2022-08-29
Payer: COMMERCIAL

## 2022-08-29 PROCEDURE — 97113 AQUATIC THERAPY/EXERCISES: CPT

## 2022-08-29 NOTE — PROGRESS NOTES
[address] [facility]  PHYSICAL THERAPY  [] EVALUATION  [] DAILY NOTE (LAND) [x] DAILY NOTE (AQUATIC ) [] PROGRESS NOTE [] DISCHARGE NOTE    [x] [facility] [place]   [] [place] [address]    [] [facility]   [] Cathie Vallejo    Date: 2022  Patient Name:  Ashlyn Jarrett  : 1980  MRN: [id number]  CSN: [id number]    Referring Practitioner Bonny Sawyer MD   Diagnosis Radiculopathy, lumbar region [M54.16]  Other low back pain [M54.59]  Spondylolisthesis, lumbar region [M43.16]    Treatment Diagnosis Chronic lumbar pain, core and hip weakness   Date of Evaluation 22    Additional Pertinent History Fibromyalgia, osteopenia in spine. Left shoulder injury 2022. Functional Outcome Measure Used Modified Oswestry   Functional Outcome Score 27/50  (22)       Insurance: Primary: Payor: [address] [place] [address]  [address] /  /  / ,   Secondary:    Authorization Information: PRECERTIFICATION REQUIRED: No  INSURANCE THERAPY BENEFIT: No pre-certification is needed. PT, OT and ST are allowed 30 visits each per calendar year. AQUATIC THERAPY COVERED:  Yes   MODALITIES COVERED:  Yes--Iontophoresis is not covered. Hot/Cold Packs are not covered. TELEHEALTH COVERED:  Yes   Visit # 9, 9/10 for progress note   Visits Allowed: 30   Recertification Date:    Physician Follow-Up: Upon completion of PT   Physician Orders:    History of Present Illness: Pt presents with chronic back issues since she was 7years old when she fractured her tailbone. Pain is progressively worsening with age. Pt was told she needed a fusion but had hysterectomy first which did help. Over the past 10 years she had had intermittent episodes of pain. Pt was lifting and doing things she probably shouldn't be doing and felt back slip and can't seem to get it back in place. 22, CT scan showed stage 1 retrolithesis.  Pt has had PT, epidurals and seen surgeons in past. Diagnostic testing showed degenerative changes in spine. Pt notes she has had recommendation to have breast reduction. Pt developed migraines recently and has been to ED and was prescribed Topomax. Pt had been doing cannibus and holistic treatment but now is back on prescribed medication. Pt has had multiple steroid injections and been on steroid packs. Pt notes heel spurs that affect walking. Pt wants to avoid back surgery. Pt may need left bicep surgery . Pain limits sitting and standing tolerance and unable to work. Pt unable to lift without pain. 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 Pt does yoga and tries to stretch. Pt notes core has been week since having her son 18 years ago. Pt has to complete PT to get MRI approved by insurance. SUBJECTIVE: Pt reports her daughter is back to school so she had to change her therapy time for that.       OBJECTIVE:   AQUATICS TREATMENT   Precautions:    Pain: 3/10     X in shaded column indicates activity completed today   Exercise/Intervention Sets/Sec  Notes   Walk Forward 2 laps  x    Walk Backward 2 laps  x    Walk Sideways 2 laps  x           Lower Extremity Exercises:       Heel/Toe Raises 15  x    Marches 15  x    Squats 15  x    3 Way Hip 15  x    Hamstring Curls 15  x    Lunges       Step-Ups              Lower Extremity Stretches:       Long sitting hamstring, SKTC 2x15 sec             Seated Exercises:              Upper Extremity Exercises:       Shoulder Flexion to Extension 15  x    Shoulder ABD/ADD 15  x    Shoulder Horizontal ABD/ADD 15  x    Shoulder IR/ER       Shoulder Circles       Shoulder Shrugs       Rows 15  x    Bicep Curls              Upper Extremity Stretches:              Balance:              Dynamic Gait:              Deep Water:       Hang 3 min   Worsened pain   Bicycle 30 sec      Hip ABD/ADD       Hip Flex/Ext           Specific Interventions Next Treatment: aquatics for strengthening, stretches as needed, pain management; NO DEEP WATER; land to include manual/massage, dry needling, NO mechanical traction, modalities, Astym and core strengthening for pain control    Activity/Treatment Tolerance:  [x]  Patient tolerated treatment well  []  Patient limited by fatigue  []  Patient limited by pain   []  Patient limited by medical complications  []  Other:     Assessment: No progressions due to patient showing up late to session. Goals    Patient Goal: Manage pain, avoid surgery if able    Short Term Goals: 6 weeks  Patient will hold bilateral SLR x30 seconds with <5/10 pain to improve core engagement with lifting and household chores. Patient will have equal hamstring flexibility bilaterally to decrease muscular restriction for pain control. Patient will improve bilateral hip extension strength to 4+/5 for ease walking and climbing stairs. Patient will tolerate walking 60 minutes at grocery store with < 5/10 pain. Long Term Goals: 8 weeks  Patient will be independent and compliant with HEP to achieve above goals. Patient Education:   []  HEP/Education Completed: dry needling safety questions reviewed. Advised to monitor relief after needling, drink water, stretch gently  Medbridge Access Code:  [x]  No new Education completed  []  Reviewed Prior HEP      []  Patient verbalized and/or demonstrated understanding of education provided. []  Patient unable to verbalize and/or demonstrate understanding of education provided. Will continue education. []  Barriers to learning:     PLAN:  Treatment Recommendations: Strengthening, Range of Motion, Functional Mobility Training, Gait Training, Stair Training, Manual Therapy - Soft Tissue Mobilization, Pain Management, Home Exercise Program, Patient Education, Integrative Dry Needling, Aquatics, and Modalities    []  Plan of care initiated.   Plan to see patient 2 times per week for 8 weeks to address the treatment planned outlined above.   [x]  Continue with current plan of care  []  Modify plan of care as follows:    []  Hold pending physician visit  []  Discharge    Time In 1029   Time Out 1100   Timed Code Minutes: 31   Total Treatment Time: 31     Electronically Signed by: Griselda Kava, PT

## 2022-08-31 ENCOUNTER — APPOINTMENT (OUTPATIENT)
Dept: PHYSICAL THERAPY | Age: 42
End: 2022-08-31
Payer: COMMERCIAL

## 2022-09-01 ENCOUNTER — HOSPITAL ENCOUNTER (OUTPATIENT)
Dept: PHYSICAL THERAPY | Age: 42
Setting detail: THERAPIES SERIES
Discharge: HOME | End: 2022-09-01
Payer: COMMERCIAL

## 2022-09-01 PROCEDURE — 97110 THERAPEUTIC EXERCISES: CPT

## 2022-09-01 NOTE — PROGRESS NOTES
[address] [facility]  PHYSICAL THERAPY  [] EVALUATION  [] DAILY NOTE (LAND) [] DAILY NOTE (AQUATIC ) [x] PROGRESS NOTE [] DISCHARGE NOTE    [x] [facility] [place]   [] [place] [address]    [] [facility]   [] Spike Hazard    Date: 2022  Patient Name:  Khushboo Mullen  : 1980  MRN: [id number]  CSN: [id number]    Referring Practitioner Rosa MD Esteban   Diagnosis Radiculopathy, lumbar region [M54.16]  Other low back pain [M54.59]  Spondylolisthesis, lumbar region [M43.16]    Treatment Diagnosis Chronic lumbar pain, core and hip weakness   Date of Evaluation 22    Additional Pertinent History Fibromyalgia, osteopenia in spine. Left shoulder injury 2022. Functional Outcome Measure Used Modified Oswestry   Functional Outcome Score 27/50  (22)   13/50 (22)       Insurance: Primary: Payor: [address] [place] [address]  [address] /  /  / ,   Secondary:    Authorization Information: PRECERTIFICATION REQUIRED: No  INSURANCE THERAPY BENEFIT: No pre-certification is needed. PT, OT and ST are allowed 30 visits each per calendar year. AQUATIC THERAPY COVERED:  Yes   MODALITIES COVERED:  Yes--Iontophoresis is not covered. Hot/Cold Packs are not covered. TELEHEALTH COVERED:  Yes   Visit # 10, 0/10 for progress note    Visits Allowed: 30   Recertification Date: 81    Physician Follow-Up: Dr. Jennifer Douglas 22 consult   Rheumatology consult - needing Dexa scan    Physician Orders:    History of Present Illness: Pt presents with chronic back issues since she was 7years old when she fractured her tailbone. Pain is progressively worsening with age. Pt was told she needed a fusion but had hysterectomy first which did help. Over the past 10 years she had had intermittent episodes of pain. Pt was lifting and doing things she probably shouldn't be doing and felt back slip and can't seem to get it back in place.  22, CT scan showed stage 1 retrolithesis. Pt has had PT, epidurals and seen surgeons in past. Diagnostic testing showed degenerative changes in spine. Pt notes she has had recommendation to have breast reduction. Pt developed migraines recently and has been to ED and was prescribed Topomax. Pt had been doing cannibus and holistic treatment but now is back on prescribed medication. Pt has had multiple steroid injections and been on steroid packs. Pt notes heel spurs that affect walking. Pt wants to avoid back surgery. Pt may need left bicep surgery . Pain limits sitting and standing tolerance and unable to work. Pt unable to lift without pain. 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 Pt does yoga and tries to stretch. Pt notes core has been week since having her son 18 years ago. Pt has to complete PT to get MRI approved by insurance. SUBJECTIVE: Patient has been attending therapy for over a month, alternating land and pool sessions and having dry needling about once per week. She feels much less pain in her back and having less moments of slipping/ popping out of alignment. She still has difficulty with long durations of standing and lifting heavy items or bending her back.      OBJECTIVE:  TREATMENT   Precautions:     Pain: Low back and left buttock     \"X in shaded column indicates activity completed today     *\" next to exercise/intervention indicates progression   Modalities Parameters/  Location   Notes   Dry Needlin mm paravertebral points T8, T10, T12, L2, L4, L5, S1, S3     10 mins lumbar needling    Dry Needlin mm inferior gluteal point, superior cluneal points bilateral, left hip ischial tuberosity                  Manual Therapy Time/Technique   Notes                                 Exercise/Intervention     Notes   Hooklying - small towel folded under lumbar for proprioception          Posterior pelvic tilt  10x 5 sec      PPT +  REASSESSMENT: see goals for ROM, strength, questionnaire   X                                                         Specific Interventions Next Treatment: manual/massage, dry needling, NO mechanical traction, modalities, Astym and core strengthening for pain control    Activity/Treatment Tolerance:  [x]  Patient tolerated treatment well  []  Patient limited by fatigue  []  Patient limited by pain   []  Patient limited by medical complications  []  Other:     Assessment: Patient feels therapy has helped strengthen her back with pool therapy and she has also been performing HEP given. Her back feels more stable and having less frequent popping or slipping out of alignment. She still struggles with weakness especially after long durations of standing and lifting heavy items. Plan to continue PT for core strengthening, lumbar stabilization, and dry needling once per week. Given aquacise handout for group class starting next month. Goals    Patient Goal: Manage pain, avoid surgery if able    Short Term Goals: 6 weeks  Patient will hold bilateral SLR x30 seconds with <5/10 pain to improve core engagement with lifting and household chores. GOAL NOT MET: core strength progressing slowly. Continue Goal    Patient will have equal hamstring flexibility bilaterally to decrease muscular restriction for pain control. GOAL MET:  hamstring 0-80 deg bilateral.  Continue Goal    Patient will improve bilateral hip extension strength to 4+/5 for ease walking and climbing stairs. GOAL NOT MET: hip extension 4-/5 still having instability and back pain. Continue Goal    Patient will tolerate walking 60 minutes at grocery store with < 5/10 pain. GOAL NOT MET:  able to tolerate walking about 20 minutes at most.  Continue Goal      Long Term Goals: 8 weeks  Patient will be independent and compliant with HEP to achieve above goals. GOAL MET:  Ongoing - needs progressions for core strengthening.   Continue Goal      Patient Education:   [x]  HEP/Education Completed: Given aquacise class handout, encouraged to continue gentle bracing HEP  Medbridge Access Code:  []  No new Education completed  [x]  Reviewed Prior HEP      [x]  Patient verbalized and/or demonstrated understanding of education provided. []  Patient unable to verbalize and/or demonstrate understanding of education provided. Will continue education. []  Barriers to learning:     PLAN:  Treatment Recommendations: Strengthening, Range of Motion, Functional Mobility Training, Gait Training, Stair Training, Manual Therapy - Soft Tissue Mobilization, Pain Management, Home Exercise Program, Patient Education, Integrative Dry Needling, Aquatics, and Modalities    []  Plan of care initiated. Plan to see patient 2 times per week for 8 weeks to address the treatment planned outlined above.   [x]  Continue with current plan of care  []  Modify plan of care as follows:    []  Hold pending physician visit  []  Discharge    Time In 1011   Time Out 1030   Timed Code Minutes: 19   Total Treatment Time: 19     Electronically Signed by: Deb Hyde PT

## 2022-09-11 ENCOUNTER — TRANSCRIBE ORDERS (OUTPATIENT)
Dept: ADMINISTRATIVE | Age: 42
End: 2022-09-11

## 2022-09-11 DIAGNOSIS — M79.7 FIBROMYALGIA: Primary | ICD-10-CM

## 2022-09-15 ENCOUNTER — HOSPITAL ENCOUNTER (OUTPATIENT)
Dept: PHYSICAL THERAPY | Age: 42
Setting detail: THERAPIES SERIES
End: 2022-09-15
Payer: COMMERCIAL

## 2022-09-16 ENCOUNTER — HOSPITAL ENCOUNTER (OUTPATIENT)
Dept: WOMENS IMAGING | Age: 42
Discharge: HOME | End: 2022-09-16
Payer: COMMERCIAL

## 2022-09-16 DIAGNOSIS — M79.7 FIBROMYALGIA: ICD-10-CM

## 2022-09-16 PROCEDURE — 77080 DXA BONE DENSITY AXIAL: CPT

## 2022-09-22 ENCOUNTER — HOSPITAL ENCOUNTER (OUTPATIENT)
Dept: PHYSICAL THERAPY | Age: 42
Setting detail: THERAPIES SERIES
Discharge: HOME | End: 2022-09-22
Payer: COMMERCIAL

## 2022-09-22 PROCEDURE — 97110 THERAPEUTIC EXERCISES: CPT

## 2022-09-22 NOTE — PROGRESS NOTES
[address] [facility]  PHYSICAL THERAPY  [] EVALUATION  [x] DAILY NOTE (LAND) [] DAILY NOTE (AQUATIC ) [] PROGRESS NOTE [] DISCHARGE NOTE    [x] [facility] [place]   [] [place] [address]    [] [facility]   [] Lopez Granado    Date: 2022  Patient Name:  Raquel Cesar  : 1980  MRN: [id number]  CSN: [id number]    Referring Practitioner Carmen Acevedo MD   Diagnosis Radiculopathy, lumbar region [M54.16]  Other low back pain [M54.59]  Spondylolisthesis, lumbar region [M43.16]    Treatment Diagnosis Chronic lumbar pain, core and hip weakness   Date of Evaluation 22    Additional Pertinent History Fibromyalgia, osteopenia in spine. Left shoulder injury 2022. Lumbar XR 22 - showing decreased retrolisthesis in Lumbar spine. MRI ordered      Functional Outcome Measure Used Modified Oswestry   Functional Outcome Score 27/50  (22)   13/50 (22)       Insurance: Primary: Payor: [address] [place] [address]  [address] /  /  / ,   Secondary:    Authorization Information: PRECERTIFICATION REQUIRED: No  INSURANCE THERAPY BENEFIT: No pre-certification is needed. PT, OT and ST are allowed 30 visits each per calendar year. AQUATIC THERAPY COVERED:  Yes   MODALITIES COVERED:  Yes--Iontophoresis is not covered. Hot/Cold Packs are not covered. TELEHEALTH COVERED:  Yes   Visit # 11, 1/10 for progress note    Visits Allowed: 30   Recertification Date:     Physician Follow-Up: Dr. Jimmie Dykes 22 consult, MRI ordered, follow up Oct 11  Rheumatology consult 22 - Dexa scan [facility]    Physician Orders:    History of Present Illness: Pt presents with chronic back issues since she was 7years old when she fractured her tailbone. Pain is progressively worsening with age. Pt was told she needed a fusion but had hysterectomy first which did help. Over the past 10 years she had had intermittent episodes of pain.  Pt was lifting and doing things she probably shouldn't be doing and felt back slip and can't seem to get it back in place. 22, CT scan showed stage 1 retrolithesis. Pt has had PT, epidurals and seen surgeons in past. Diagnostic testing showed degenerative changes in spine. Pt notes she has had recommendation to have breast reduction. Pt developed migraines recently and has been to ED and was prescribed Topomax. Pt had been doing cannibus and holistic treatment but now is back on prescribed medication. Pt has had multiple steroid injections and been on steroid packs. Pt notes heel spurs that affect walking. Pt wants to avoid back surgery. Pt may need left bicep surgery . Pain limits sitting and standing tolerance and unable to work. Pt unable to lift without pain. 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 Pt does yoga and tries to stretch. Pt notes core has been week since having her son 18 years ago. Pt has to complete PT to get MRI approved by insurance. SUBJECTIVE: Patient has not been seen for dry needling PT for a few weeks. She notes decreased back pain, working 4x/week bartending. Has to lift heavy weights occasionally or carry it. Having less frequent popping and slipping in her back. Her ortho consult with Dr. Cardoza Congress showed less severity of retrolisthesis compared to previous studies.      OBJECTIVE:  TREATMENT   Precautions:     Pain: Low back and left buttock     \"X in shaded column indicates activity completed today     *\" next to exercise/intervention indicates progression   Modalities Parameters/  Location   Notes   Dry Needlin mm paravertebral points T8, T10, T12, L2, L4, L5, S1, S3   x  5 mins lumbar needling    Dry Needlin mm inferior gluteal point, superior cluneal points bilateral, left hip ischial tuberosity   x               Manual Therapy Time/Technique   Notes                                 Exercise/Intervention     Notes   Hooklying : Posterior pelvic tilt  10x 5 sec x     PPT + march 10x   x     Mini bridge with Glute set 5x  x    Sidelying: clamshell  5x ea  x                                                                                    Dry needling manual therapy: needle was inserted, pistoned, rotated, and/or coned to produce intramuscular mobilization. These techniques were used to restore functional range of motion, reduce muscle spasm and induce healing in the corresponding musculature. (80266)   Clean Technique was utilized today while applying Dry needling treatment. The treatment sites were cleaned with 70% solution of isopropyl alcohol. The therapist washed/sanitized their hands and utilized treatment gloves prior to inserting the needles. All needles were removed and discarded in the appropriate sharps container. \"      Specific Interventions Next Treatment: manual/massage, dry needling, NO mechanical traction, modalities, Astym and core strengthening for pain control    Activity/Treatment Tolerance:  [x]  Patient tolerated treatment well  []  Patient limited by fatigue  []  Patient limited by pain   []  Patient limited by medical complications  []  Other:     Assessment: Patient able to resume dry needling therapy with good tolerance, noted tender points along lumbar spine and B SI regions L>R. Followed with posterior tilt exercise and marching, with patient needing several cues for technique and poor stabilization during marching. Given for HEP and plan to progress core strengthening of the abdominals, glutes and hips for stabilization of the lumbar and SI during working and lifting tasks. Goals    Patient Goal: Manage pain, avoid surgery if able    Short Term Goals: 6 weeks  Patient will hold bilateral SLR x30 seconds with <5/10 pain to improve core engagement with lifting and household chores. Patient will improve bilateral hip extension strength to 4+/5 for ease walking and climbing stairs.       Patient will tolerate walking 60 minutes at grocery store with < 5/10 pain. Long Term Goals: 8 weeks  Patient will be independent and compliant with HEP to achieve above goals. Patient Education:   [x]  HEP/Education Completed: HEP handout given for core strengthening   [facility] Access Code: [email or web address]  []  No new Education completed  [x]  Reviewed Prior HEP      [x]  Patient verbalized and/or demonstrated understanding of education provided. []  Patient unable to verbalize and/or demonstrate understanding of education provided. Will continue education. []  Barriers to learning:     PLAN:  Treatment Recommendations: Strengthening, Range of Motion, Functional Mobility Training, Gait Training, Stair Training, Manual Therapy - Soft Tissue Mobilization, Pain Management, Home Exercise Program, Patient Education, Integrative Dry Needling, Aquatics, and Modalities    []  Plan of care initiated. Plan to see patient 2 times per week for 8 weeks to address the treatment planned outlined above.   [x]  Continue with current plan of care  []  Modify plan of care as follows:    []  Hold pending physician visit  []  Discharge    Time In 955   Time Out 1030   Timed Code Minutes: 30   Total Treatment Time: 35     Electronically Signed by: Wan Abdul PT

## 2022-09-26 ENCOUNTER — OFFICE VISIT (OUTPATIENT)
Dept: RHEUMATOLOGY | Age: 42
End: 2022-09-26
Payer: COMMERCIAL

## 2022-09-26 ENCOUNTER — NURSE ONLY (OUTPATIENT)
Dept: LAB | Age: 42
End: 2022-09-26

## 2022-09-26 VITALS
HEIGHT: 60 IN | SYSTOLIC BLOOD PRESSURE: 104 MMHG | DIASTOLIC BLOOD PRESSURE: 78 MMHG | OXYGEN SATURATION: 98 % | WEIGHT: 189.6 LBS | BODY MASS INDEX: 37.22 KG/M2 | HEART RATE: 65 BPM

## 2022-09-26 DIAGNOSIS — M25.50 MULTIPLE JOINT PAIN: ICD-10-CM

## 2022-09-26 DIAGNOSIS — M25.50 MULTIPLE JOINT PAIN: Primary | ICD-10-CM

## 2022-09-26 LAB
ALBUMIN SERPL-MCNC: 4.4 G/DL (ref 3.5–5.1)
ALP BLD-CCNC: 58 U/L (ref 38–126)
ALT SERPL-CCNC: 16 U/L (ref 11–66)
ANION GAP SERPL CALCULATED.3IONS-SCNC: 11 MEQ/L (ref 8–16)
AST SERPL-CCNC: 14 U/L (ref 5–40)
BASOPHILS # BLD: 0.1 %
BASOPHILS ABSOLUTE: 0 THOU/MM3 (ref 0–0.1)
BILIRUB SERPL-MCNC: 0.2 MG/DL (ref 0.3–1.2)
BUN BLDV-MCNC: 15 MG/DL (ref 7–22)
C-REACTIVE PROTEIN: < 0.3 MG/DL (ref 0–1)
CALCIUM SERPL-MCNC: 9.7 MG/DL (ref 8.5–10.5)
CHLORIDE BLD-SCNC: 105 MEQ/L (ref 98–111)
CO2: 25 MEQ/L (ref 23–33)
CREAT SERPL-MCNC: 0.6 MG/DL (ref 0.4–1.2)
EOSINOPHIL # BLD: 2.2 %
EOSINOPHILS ABSOLUTE: 0.2 THOU/MM3 (ref 0–0.4)
ERYTHROCYTE [DISTWIDTH] IN BLOOD BY AUTOMATED COUNT: 13 % (ref 11.5–14.5)
ERYTHROCYTE [DISTWIDTH] IN BLOOD BY AUTOMATED COUNT: 44.7 FL (ref 35–45)
GFR SERPL CREATININE-BSD FRML MDRD: > 90 ML/MIN/1.73M2
GLUCOSE BLD-MCNC: 96 MG/DL (ref 70–108)
HCT VFR BLD CALC: 42.9 % (ref 37–47)
HEMOGLOBIN: 14.5 GM/DL (ref 12–16)
IMMATURE GRANS (ABS): 0.04 THOU/MM3 (ref 0–0.07)
IMMATURE GRANULOCYTES: 0.5 %
LYMPHOCYTES # BLD: 40.3 %
LYMPHOCYTES ABSOLUTE: 3 THOU/MM3 (ref 1–4.8)
MCH RBC QN AUTO: 31.7 PG (ref 26–33)
MCHC RBC AUTO-ENTMCNC: 33.8 GM/DL (ref 32.2–35.5)
MCV RBC AUTO: 93.7 FL (ref 81–99)
MONOCYTES # BLD: 6.6 %
MONOCYTES ABSOLUTE: 0.5 THOU/MM3 (ref 0.4–1.3)
NUCLEATED RED BLOOD CELLS: 0 /100 WBC
PLATELET # BLD: 235 THOU/MM3 (ref 130–400)
PMV BLD AUTO: 11.5 FL (ref 9.4–12.4)
POTASSIUM SERPL-SCNC: 4.1 MEQ/L (ref 3.5–5.2)
RBC # BLD: 4.58 MILL/MM3 (ref 4.2–5.4)
RHEUMATOID FACTOR: < 10 IU/ML (ref 0–13)
SEDIMENTATION RATE, ERYTHROCYTE: 11 MM/HR (ref 0–20)
SEG NEUTROPHILS: 50.3 %
SEGMENTED NEUTROPHILS ABSOLUTE COUNT: 3.7 THOU/MM3 (ref 1.8–7.7)
SODIUM BLD-SCNC: 141 MEQ/L (ref 135–145)
TOTAL PROTEIN: 6.5 G/DL (ref 6.1–8)
TSH SERPL DL<=0.05 MIU/L-ACNC: 0.79 UIU/ML (ref 0.4–4.2)
URIC ACID: 4.6 MG/DL (ref 2.4–5.7)
WBC # BLD: 7.4 THOU/MM3 (ref 4.8–10.8)

## 2022-09-26 PROCEDURE — G8417 CALC BMI ABV UP PARAM F/U: HCPCS | Performed by: INTERNAL MEDICINE

## 2022-09-26 PROCEDURE — 99204 OFFICE O/P NEW MOD 45 MIN: CPT | Performed by: INTERNAL MEDICINE

## 2022-09-26 PROCEDURE — 4004F PT TOBACCO SCREEN RCVD TLK: CPT | Performed by: INTERNAL MEDICINE

## 2022-09-26 PROCEDURE — G8427 DOCREV CUR MEDS BY ELIG CLIN: HCPCS | Performed by: INTERNAL MEDICINE

## 2022-09-26 ASSESSMENT — ENCOUNTER SYMPTOMS
VOMITING: 0
BLOOD IN STOOL: 0
SHORTNESS OF BREATH: 1
NAUSEA: 0
WHEEZING: 0
ABDOMINAL PAIN: 0
COUGH: 1
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
## 2022-09-28 LAB
ANTI RNP AB: 2 UNITS (ref 0–19)
ANTI SSA: NORMAL
ANTI SSB: 0 AU/ML (ref 0–40)
ANTI-SMITH: 0 AU/ML (ref 0–40)
DSDNA ANTIBODY: NORMAL

## 2022-09-29 ENCOUNTER — APPOINTMENT (OUTPATIENT)
Dept: PHYSICAL THERAPY | Age: 42
End: 2022-09-29
Payer: COMMERCIAL

## 2022-09-29 LAB
ANA SCREEN: NORMAL
CYCLIC CITRULLINATED PEPTIDE ANTIBODY IGG: < 0.4 U/ML (ref 0–7)

## 2022-10-02 ENCOUNTER — HOSPITAL ENCOUNTER (EMERGENCY)
Age: 42
Discharge: HOME | End: 2022-10-02
Payer: COMMERCIAL

## 2022-10-02 VITALS
BODY MASS INDEX: 36.08 KG/M2 | SYSTOLIC BLOOD PRESSURE: 136 MMHG | OXYGEN SATURATION: 96 % | HEART RATE: 82 BPM | DIASTOLIC BLOOD PRESSURE: 84 MMHG | WEIGHT: 185 LBS | RESPIRATION RATE: 14 BRPM | TEMPERATURE: 98.2 F

## 2022-10-02 DIAGNOSIS — N63.20 MASS OF LEFT BREAST, UNSPECIFIED QUADRANT: Primary | ICD-10-CM

## 2022-10-02 PROCEDURE — 99283 EMERGENCY DEPT VISIT LOW MDM: CPT

## 2022-10-02 ASSESSMENT — PAIN DESCRIPTION - LOCATION: LOCATION: BREAST

## 2022-10-02 ASSESSMENT — ENCOUNTER SYMPTOMS
DIARRHEA: 0
NAUSEA: 0
VOMITING: 0
COUGH: 0
SORE THROAT: 0

## 2022-10-02 ASSESSMENT — PAIN DESCRIPTION - ORIENTATION: ORIENTATION: LEFT

## 2022-10-02 ASSESSMENT — PAIN DESCRIPTION - ONSET: ONSET: ON-GOING

## 2022-10-02 ASSESSMENT — PAIN DESCRIPTION - DESCRIPTORS: DESCRIPTORS: PRESSURE

## 2022-10-02 ASSESSMENT — PAIN DESCRIPTION - FREQUENCY: FREQUENCY: CONTINUOUS

## 2022-10-02 ASSESSMENT — PAIN SCALES - GENERAL: PAINLEVEL_OUTOF10: 7

## 2022-10-02 ASSESSMENT — PAIN DESCRIPTION - PAIN TYPE: TYPE: ACUTE PAIN

## 2022-10-02 NOTE — ED PROVIDER NOTES
[address] [facility] [address] [address] EMERGENCY DEPT  [address] [facility] [address]  Phone: [phone]        CHIEF COMPLAINT       Chief Complaint   Patient presents with    Breast Pain     Left breast with lump       Nurses Notes reviewed and I agree except as notedin the HPI. HISTORY OF PRESENT ILLNESS    Chelsey Peterson is a 40 y.o. female who presents with lump in her left breast has been there for 3 days. She states that she noted to be painful to palpation and has not been red. She did see rheumatology for some hair loss and some other issues that he is having. She states that she has no family history of breast cancer. She is had no issues with her breast in the past has not had any nipple discharge. REVIEW OF SYSTEMS     Review of Systems   Constitutional:  Negative for chills and fever. HENT:  Negative for congestion, ear pain and sore throat. Respiratory:  Negative for cough. Cardiovascular:  Negative for chest pain and palpitations. Gastrointestinal:  Negative for diarrhea, nausea and vomiting. Genitourinary:  Negative for dysuria and urgency. Left breast lump   Musculoskeletal:  Negative for myalgias and neck pain. Skin:  Negative for rash. All other systems reviewed and are negative. PAST MEDICAL HISTORY    has a past medical history of Back pain, Cystitis, interstitial, Esophageal ulcer, Headache(784.0), Hyperlipidemia, Insulin resistance, Irritable bowel syndrome, and Spondylolisthesis. SURGICAL HISTORY      has a past surgical history that includes cyst removal (01/01/2011); Hysterectomy (2010); Abdominal adhesion surgery (01/01/2012); Colonoscopy (2005, 2011); Tonsillectomy and adenoidectomy (01/01/1987); [place] tooth extraction (01/01/1996); and Ovary removal (2010).     CURRENT MEDICATIONS       Discharge Medication List as of 10/2/2022  9:56 AM        CONTINUE these medications which have NOT CHANGED    Details   Respiratory Therapy Supplies (NEBULIZER/TUBING/MOUTHPIECE) KIT DAILY PRN Starting Mon 9/6/2021, Disp-1 kit, R-0, Print      albuterol (PROVENTIL) (2.5 MG/3ML) 0.083% nebulizer solution Take 3 mLs by nebulization every 6 hours as needed for Wheezing or Shortness of Breath, Disp-120 each, R-0Print             ALLERGIES     is allergic to other. HISTORY     She indicated that her mother is alive. She indicated that her father is alive. She indicated that the status of her sister is unknown. She indicated that her brother is alive. family history includes Alcohol Abuse in her father, mother, and sister; Arthritis in her father and mother; Heart Disease in her mother; Migraines in her mother; Obesity in her father; Osteoarthritis in her father and mother. SOCIALHISTORY      reports that she has been smoking cigarettes. She has a 16.00 pack-year smoking history. She has never used smokeless tobacco. She reports current alcohol use. She reports current drug use. Drug: Marijuana Humphrey Fogo). PHYSICAL EXAM     INITIAL VITALS:  weight is 185 lb (83.9 kg). Her oral temperature is 98.2 °F (36.8 °C). Her blood pressure is 136/84 and her pulse is 82. Her respiration is 14 and oxygen saturation is 96%. Physical Exam  Vitals and nursing note reviewed. HENT:      Head: Normocephalic and atraumatic. Eyes:      Pupils: Pupils are equal, round, and reactive to light. Neck:      Thyroid: No thyromegaly. Trachea: No tracheal deviation. Cardiovascular:      Rate and Rhythm: Normal rate and regular rhythm. Heart sounds: No murmur heard. No friction rub. Pulmonary:      Effort: Pulmonary effort is normal. No respiratory distress. Breath sounds: Normal breath sounds. No wheezing. Abdominal:      General: Bowel sounds are normal. There is no distension. Palpations: Abdomen is soft. Tenderness: There is no abdominal tenderness. Genitourinary:     Comments: 3 x 1 cm palpable lump in the left breast at the 2 o'clock position.   The patient does not have any nipple discharge does not have any redness. There is no lymphadenopathy. Musculoskeletal:      Cervical back: Neck supple. Skin:     General: Skin is warm and dry. Findings: No erythema or rash. Neurological:      Mental Status: She is alert and oriented to person, place, and time. DIFFERENTIAL DIAGNOSIS:   Left breast lump    DIAGNOSTIC RESULTS     EKG: All EKG's are interpreted by the Emergency Department Physician who either signs or Co-signs this chart in the absence of a cardiologist.      RADIOLOGY: non-plain film images(s) such as CT, Ultrasound and MRI are read by the radiologist.  US BREAST COMPLETE LEFT    (Results Pending)         LABS:   Labs Reviewed - No data to display    EMERGENCY DEPARTMENT COURSE:   :    Vitals:    10/02/22 0938   BP: 136/84   Pulse: 82   Resp: 14   Temp: 98.2 °F (36.8 °C)   TempSrc: Oral   SpO2: 96%   Weight: 185 lb (83.9 kg)     Patient was seen history physical exam was performed. Case was discussed with general surgery Dr. Siobhan Masterson who recommended OP follow up and OP Breast US. see disposition below    CRITICAL CARE:  None    CONSULTS:  Dr Tejas Cho:  None    FINAL IMPRESSION      1. Mass of left breast, unspecified quadrant          DISPOSITION/PLAN   Discharge    PATIENT REFERRED TO:  Vinh Manuel MD  [address] [place] [address].  [place] [phone]      Call office for follow up    Paulette Cotto MD  [address] [place] [address]  [phone]          DISCHARGE MEDICATIONS:  Discharge Medication List as of 10/2/2022  9:56 AM          (Please note that portions of this note were completed with a voice recognitionprogram.  Efforts were made to edit the dictations but occasionally words are mis-transcribed.)    BROOKE Pereyra PA  10/02/22 [address] [place], [place]  10/02/22 1392

## 2022-10-02 NOTE — ED NOTES
Patient presents to the ED with concerns of left breast pain with a pressure/fullness pain in her left breast.      Shay Blair RN  10/02/22 5360

## 2022-10-06 ENCOUNTER — HOSPITAL ENCOUNTER (OUTPATIENT)
Dept: PHYSICAL THERAPY | Age: 42
Setting detail: THERAPIES SERIES
Discharge: HOME | End: 2022-10-06
Payer: COMMERCIAL

## 2022-10-06 PROCEDURE — 97110 THERAPEUTIC EXERCISES: CPT

## 2022-10-06 NOTE — PROGRESS NOTES
[address] [facility]  PHYSICAL THERAPY  [] EVALUATION  [x] DAILY NOTE (LAND) [] DAILY NOTE (AQUATIC ) [] PROGRESS NOTE [] DISCHARGE NOTE    [x] [facility] [place]   [] [place] [address]    [] [facility]   [] Shayan Batch    Date: 10/6/2022  Patient Name:  Kimani James  : 1980  MRN: [id number]  CSN: [id number]    Referring Practitioner Stephani Mckoy MD   Diagnosis Radiculopathy, lumbar region [M54.16]  Other low back pain [M54.59]  Spondylolisthesis, lumbar region [M43.16]    Treatment Diagnosis Chronic lumbar pain, core and hip weakness   Date of Evaluation 22    Additional Pertinent History Fibromyalgia, osteopenia in spine. Left shoulder injury 2022. Lumbar XR 22 - showing decreased retrolisthesis in Lumbar spine. MRI ordered      Functional Outcome Measure Used Modified Oswestry   Functional Outcome Score 27/50  (22)   13/50 (22)       Insurance: Primary: Payor: [address] [place] [address] [address] /  /  / ,   Secondary:    Authorization Information: PRECERTIFICATION REQUIRED: No  INSURANCE THERAPY BENEFIT: No pre-certification is needed. PT, OT and ST are allowed 30 visits each per calendar year. AQUATIC THERAPY COVERED:  Yes   MODALITIES COVERED:  Yes--Iontophoresis is not covered. Hot/Cold Packs are not covered. TELEHEALTH COVERED:  Yes   Visit # 12, 210 for progress note    Visits Allowed: 30   Recertification Date:     Physician Follow-Up: Dr. Dane Arias 22 consult, MRI ordered, follow up Oct 11   Rheumatology consult 22 - Dexa scan [facility]    Physician Orders:    History of Present Illness: Pt presents with chronic back issues since she was 7years old when she fractured her tailbone. Pain is progressively worsening with age. Pt was told she needed a fusion but had hysterectomy first which did help. Over the past 10 years she had had intermittent episodes of pain.  Pt was lifting and doing things she probably shouldn't be doing and felt back slip and can't seem to get it back in place. 22, CT scan showed stage 1 retrolithesis. Pt has had PT, epidurals and seen surgeons in past. Diagnostic testing showed degenerative changes in spine. Pt notes she has had recommendation to have breast reduction. Pt developed migraines recently and has been to ED and was prescribed Topomax. Pt had been doing cannibus and holistic treatment but now is back on prescribed medication. Pt has had multiple steroid injections and been on steroid packs. Pt notes heel spurs that affect walking. Pt wants to avoid back surgery. Pt may need left bicep surgery . Pain limits sitting and standing tolerance and unable to work. Pt unable to lift without pain. 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 Pt does yoga and tries to stretch. Pt notes core has been week since having her son 18 years ago. Pt has to complete PT to get MRI approved by insurance. 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 She is also trying to get approval for lumbar MRI. Patient has noted more popping and slipping in her back this week.      OBJECTIVE:  TREATMENT   Precautions:  Precaution for Left breast lump - following with doctors   Pain: Low back and left buttock     \"X in shaded column indicates activity completed today     *\" next to exercise/intervention indicates progression   Modalities Parameters/  Location   Notes   Dry Needlin mm paravertebral points T8, T10, T12, L2, L4, L5, S1, S3   x  10 mins lumbar needling    Dry Needlin mm inferior gluteal point, superior cluneal points bilateral, (Not today - left hip ischial tuberosity )   x               Manual Therapy Time/Technique   Notes                                 Exercise/Intervention     Notes   NuStep warm up -  5 mins Level 5 x                  Hooklying : Posterior pelvic tilt  10x 5 sec x     PPT + march  10x   x  Cues for technique   Bridge with band* 10x green x    Sidelying: clamshell  10x Green* x                                                                                    Dry needling manual therapy: needle was inserted, pistoned, rotated, and/or coned to produce intramuscular mobilization. These techniques were used to restore functional range of motion, reduce muscle spasm and induce healing in the corresponding musculature. ([id number])   Clean Technique was utilized today while applying Dry needling treatment. The treatment sites were cleaned with 70% solution of isopropyl alcohol. The therapist washed/sanitized their hands and utilized treatment gloves prior to inserting the needles. All needles were removed and discarded in the appropriate sharps container. \"      Specific Interventions Next Treatment: manual/massage, dry needling, NO mechanical traction, modalities, Astym and core strengthening for pain control    Activity/Treatment Tolerance:  [x]  Patient tolerated treatment well  []  Patient limited by fatigue  []  Patient limited by pain   []  Patient limited by medical complications  []  Other:     Assessment: Progressed strengthening today with NuStep warm up, addition of theraband during hip exercises for improved bracing of lumbar and SI regions. Patient tolerated well but reported tightness at end, finished with dry needling with decreased pain. Goals    Patient Goal: Manage pain, avoid surgery if able    Short Term Goals: 6 weeks  Patient will hold bilateral SLR x30 seconds with <5/10 pain to improve core engagement with lifting and household chores. Patient will improve bilateral hip extension strength to 4+/5 for ease walking and climbing stairs. Patient will tolerate walking 60 minutes at grocery store with < 5/10 pain.      Long Term Goals: 8 weeks  Patient will be independent and compliant with HEP to achieve above goals. Patient Education:   [x]  HEP/Education Completed: HEP handout given for core strengthening   [facility] Access Code: [email or web address]  []  No new Education completed  [x]  Reviewed Prior HEP      [x]  Patient verbalized and/or demonstrated understanding of education provided. []  Patient unable to verbalize and/or demonstrate understanding of education provided. Will continue education. []  Barriers to learning:     PLAN:  Treatment Recommendations: Strengthening, Range of Motion, Functional Mobility Training, Gait Training, Stair Training, Manual Therapy - Soft Tissue Mobilization, Pain Management, Home Exercise Program, Patient Education, Integrative Dry Needling, Aquatics, and Modalities    []  Plan of care initiated. Plan to see patient 2 times per week for 8 weeks to address the treatment planned outlined above.   [x]  Continue with current plan of care  []  Modify plan of care as follows:    []  Hold pending physician visit  []  Discharge    Time In 850   Time Out 930   Timed Code Minutes: 30   Total Treatment Time: 40     Electronically Signed by: Jermaine Singh PT

## 2022-10-11 ENCOUNTER — OFFICE VISIT (OUTPATIENT)
Dept: RHEUMATOLOGY | Age: 42
End: 2022-10-11
Payer: COMMERCIAL

## 2022-10-11 VITALS
HEART RATE: 87 BPM | HEIGHT: 60 IN | DIASTOLIC BLOOD PRESSURE: 80 MMHG | OXYGEN SATURATION: 98 % | WEIGHT: 185 LBS | BODY MASS INDEX: 36.32 KG/M2 | SYSTOLIC BLOOD PRESSURE: 126 MMHG

## 2022-10-11 DIAGNOSIS — L23.7 POISON IVY DERMATITIS: Primary | ICD-10-CM

## 2022-10-11 PROCEDURE — 99213 OFFICE O/P EST LOW 20 MIN: CPT | Performed by: INTERNAL MEDICINE

## 2022-10-11 PROCEDURE — G8484 FLU IMMUNIZE NO ADMIN: HCPCS | Performed by: INTERNAL MEDICINE

## 2022-10-11 PROCEDURE — 4004F PT TOBACCO SCREEN RCVD TLK: CPT | Performed by: INTERNAL MEDICINE

## 2022-10-11 PROCEDURE — G8427 DOCREV CUR MEDS BY ELIG CLIN: HCPCS | Performed by: INTERNAL MEDICINE

## 2022-10-11 PROCEDURE — G8417 CALC BMI ABV UP PARAM F/U: HCPCS | Performed by: INTERNAL MEDICINE

## 2022-10-11 RX ORDER — TRIAMCINOLONE ACETONIDE 5 MG/G
CREAM TOPICAL
Qty: 15 G | Refills: 0 | Status: SHIPPED | OUTPATIENT
Start: 2022-10-11

## 2022-10-11 RX ORDER — ALPRAZOLAM 0.25 MG/1
0.25 TABLET ORAL NIGHTLY PRN
COMMUNITY

## 2022-10-11 ASSESSMENT — ENCOUNTER SYMPTOMS
BLOOD IN STOOL: 0
ABDOMINAL PAIN: 0
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
## 2022-10-12 ASSESSMENT — ENCOUNTER SYMPTOMS
COUGH: 0
SHORTNESS OF BREATH: 0

## 2022-10-13 ENCOUNTER — APPOINTMENT (OUTPATIENT)
Dept: PHYSICAL THERAPY | Age: 42
End: 2022-10-13
Payer: COMMERCIAL

## 2022-10-18 ENCOUNTER — HOSPITAL ENCOUNTER (OUTPATIENT)
Dept: PHYSICAL THERAPY | Age: 42
Setting detail: THERAPIES SERIES
Discharge: HOME | End: 2022-10-18
Payer: COMMERCIAL

## 2022-10-18 NOTE — CARE COORDINATION
Filomen[facility] 61  PHYSICAL THERAPY MISSED TREATMENT NOTE  - Do [facility]    Date: 10/18/2022  Patient Name: Enedina Acharya        MRN: [id number]   : 1980  (40 y.o.)  Gender: female                REASON FOR MISSED TREATMENT:  Missed Treat. Patient arrived 15 minutes late, and had several questions for therapist regarding her therapy plan - she has been trying to qualify for lumbar MRI and Dr. Ashlyn Kiran just submitted a medical appeal . Advised if she needs any therapy notes she can access them on [facility] or submit a request thru med records. She also was expressing her concern for her left shoulder - she has already received therapy services for the shoulder at the beginning of  - she has had an MRI of the Left shoulder and is receiving injections from Dr. Madisyn Leos, most recently last week 10/13/22. She wants to see if dry needling will help her shoulder as well as her back. Plan to reassess next week on 10/27, requesting to continue dry needling and strengthening of the lumbar spine, and may consider dry needling the left upper trap, scapular region, or Left shoulder trigger points as needed for shoulder pain. No treatment performed today.      Carol Ann Petit, PT, DPT

## 2022-10-24 ENCOUNTER — APPOINTMENT (OUTPATIENT)
Dept: GENERAL RADIOLOGY | Age: 42
End: 2022-10-24
Payer: COMMERCIAL

## 2022-10-24 ENCOUNTER — HOSPITAL ENCOUNTER (EMERGENCY)
Age: 42
Discharge: HOME | End: 2022-10-24
Attending: STUDENT IN AN ORGANIZED HEALTH CARE EDUCATION/TRAINING PROGRAM
Payer: COMMERCIAL

## 2022-10-24 ENCOUNTER — APPOINTMENT (OUTPATIENT)
Dept: CT IMAGING | Age: 42
End: 2022-10-24
Payer: COMMERCIAL

## 2022-10-24 VITALS
DIASTOLIC BLOOD PRESSURE: 77 MMHG | HEART RATE: 72 BPM | HEIGHT: 60 IN | TEMPERATURE: 96.3 F | SYSTOLIC BLOOD PRESSURE: 126 MMHG | RESPIRATION RATE: 18 BRPM | BODY MASS INDEX: 35.34 KG/M2 | WEIGHT: 180 LBS | OXYGEN SATURATION: 98 %

## 2022-10-24 DIAGNOSIS — J06.9 VIRAL UPPER RESPIRATORY TRACT INFECTION: Primary | ICD-10-CM

## 2022-10-24 LAB
BILIRUBIN URINE: NEGATIVE
BLOOD, URINE: NEGATIVE
CHARACTER, URINE: CLEAR
COLOR: YELLOW
GLUCOSE URINE: NEGATIVE MG/DL
INFLUENZA A: NOT DETECTED
INFLUENZA B: NOT DETECTED
KETONES, URINE: NEGATIVE
LEUKOCYTE ESTERASE, URINE: NEGATIVE
NITRITE, URINE: NEGATIVE
PH UA: 5 (ref 5–9)
PROTEIN UA: NEGATIVE
SARS-COV-2 RNA, RT PCR: NOT DETECTED
SPECIFIC GRAVITY, URINE: 1.02 (ref 1–1.03)
UROBILINOGEN, URINE: 0.2 EU/DL (ref 0–1)

## 2022-10-24 PROCEDURE — 6360000004 HC RX CONTRAST MEDICATION: Performed by: STUDENT IN AN ORGANIZED HEALTH CARE EDUCATION/TRAINING PROGRAM

## 2022-10-24 PROCEDURE — 87636 SARSCOV2 & INF A&B AMP PRB: CPT

## 2022-10-24 PROCEDURE — 99285 EMERGENCY DEPT VISIT HI MDM: CPT

## 2022-10-24 PROCEDURE — 71260 CT THORAX DX C+: CPT

## 2022-10-24 PROCEDURE — 81003 URINALYSIS AUTO W/O SCOPE: CPT

## 2022-10-24 PROCEDURE — 71046 X-RAY EXAM CHEST 2 VIEWS: CPT

## 2022-10-24 RX ORDER — ONDANSETRON 4 MG/1
4 TABLET, ORALLY DISINTEGRATING ORAL ONCE
Status: DISCONTINUED | OUTPATIENT
Start: 2022-10-24 | End: 2022-10-24

## 2022-10-24 RX ADMIN — IOPAMIDOL 80 ML: 755 INJECTION, SOLUTION INTRAVENOUS at 06:07

## 2022-10-24 ASSESSMENT — ENCOUNTER SYMPTOMS
CHEST TIGHTNESS: 0
VOMITING: 0
ABDOMINAL DISTENTION: 0
BACK PAIN: 0
CONSTIPATION: 0
WHEEZING: 0
COLOR CHANGE: 0
RHINORRHEA: 0
COUGH: 1
NAUSEA: 1
SHORTNESS OF BREATH: 0
SORE THROAT: 1
DIARRHEA: 0

## 2022-10-24 ASSESSMENT — PAIN - FUNCTIONAL ASSESSMENT: PAIN_FUNCTIONAL_ASSESSMENT: 0-10

## 2022-10-24 ASSESSMENT — PAIN SCALES - GENERAL: PAINLEVEL_OUTOF10: 6

## 2022-10-24 ASSESSMENT — PAIN DESCRIPTION - DESCRIPTORS: DESCRIPTORS: SORE

## 2022-10-24 ASSESSMENT — PAIN DESCRIPTION - LOCATION: LOCATION: THROAT

## 2022-10-24 ASSESSMENT — PAIN DESCRIPTION - FREQUENCY: FREQUENCY: CONTINUOUS
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
## 2022-10-24 NOTE — ED PROVIDER NOTES
[address] [facility] [address] [address] EMERGENCY DEPT  ED Attending Physician Attestation     I performed a history and physical examination of the patient and discussed management with the Resident. I reviewed the Resident's note and agree with the documented findings and plan of care. Any areas of disagreement are noted on the chart. I was personally present for the key portions of any procedures and have documented in the chart those procedures where I was not present during the key portions. I have reviewed the emergency nurses triage note and agree with the chief complaint, past medical history, past surgical history, allergies, medications, social and family history as documented unless otherwise noted below. Presentation consistent with viral upper respiratory infection. Questionable findings on chest x-ray resulted in CT chest.  CT chest reassuringly negative for any acute pathology. Patient reassured. Supportive care discussed.   PCP follow-up    Mainor Rudd MD,  Attending Emergency Physician       Mainor Rudd MD  10/24/22 8827

## 2022-10-24 NOTE — ED PROVIDER NOTES
[facility] ENCOUNTER          Pt Name: Paulino Ballard  MRN: [id number]  [place] 1980  Date of Evaluation: 10/24/2022  Treating Resident Physician: Vanessa Dumont MD  Supervising Physician: Cristian Rivera MD    CHIEF COMPLAINT       Chief Complaint   Patient presents with    Pharyngitis    Cough     History obtained from chart review and the patient. HISTORY OF PRESENT ILLNESS    HPI    Paulino Ballard is a 40 y.o. female with a past medical history significant for metabolic syndrome, migraine, abdominal adhesions, polycystic ovaries, hyperlipidemia, chronic back pain , presents to the emergency department for evaluation of cough, sore throat. Chen Gregory states she has been sick with productive cough for the past 3 weeks. She initially got a z-pack from her family doctor with some improvement in symptoms. Her significant other has also been sick and did not have any antibiotics, and she thinks she may have gotten re-infected by him. No fever. Has been coughing up yellow sputum. Smokers 1 pack per day of tobacco cigarettes, also smokes marijuana daily. Also notes chronic dysuria that is perhaps worse recently, history of interstitial cystitis. Also having nausea and decreased appetite, denied need for antiemetic saying she wants holistic approach and not synthetic drugs. The patient has no other acute complaints at this time. REVIEW OF SYSTEMS   Review of Systems   Constitutional:  Positive for appetite change. Negative for activity change, chills, diaphoresis, fatigue, fever and unexpected weight change. HENT:  Positive for sore throat. Negative for congestion, nosebleeds, rhinorrhea and sneezing. Respiratory:  Positive for cough. Negative for chest tightness, shortness of breath and wheezing. Cardiovascular:  Negative for chest pain, palpitations and leg swelling. Gastrointestinal:  Positive for nausea.  Negative for abdominal distention, constipation, diarrhea and vomiting. Genitourinary:  Negative for difficulty urinating, dysuria, frequency, hematuria, urgency, vaginal bleeding and vaginal discharge. Musculoskeletal:  Negative for back pain and neck pain. Skin:  Negative for color change, pallor, rash and wound. Allergic/Immunologic: Negative for environmental allergies and food allergies. Neurological:  Negative for dizziness, syncope, weakness, numbness and headaches. Hematological:  Negative for adenopathy. Does not bruise/bleed easily. Psychiatric/Behavioral:  Negative for agitation and confusion. All other systems reviewed and are negative. PAST MEDICAL AND SURGICAL HISTORY     Past Medical History:   Diagnosis Date    Back pain     chronic    Cystitis, interstitial     Esophageal ulcer     Headache(784.0)     Hyperlipidemia     Insulin resistance     Irritable bowel syndrome     Spondylolisthesis      Past Surgical History:   Procedure Laterality Date    ABDOMINAL ADHESION SURGERY  01/01/2012    Dr. Saray Carrasquillo  2005, 2011    Dr. Joann Garcia  01/01/2011    right ankle-Dr. Candice Zhu ([facility])  2010    Dr. Raoul Florentino  01/01/1987    WISDOM TOOTH EXTRACTION  01/01/1996         MEDICATIONS   No current facility-administered medications for this encounter.     Current Outpatient Medications:     ALPRAZolam (XANAX) 0.25 MG tablet, Take 0.25 mg by mouth nightly as needed for Sleep., Disp: , Rfl:     triamcinolone (ARISTOCORT) 0.5 % cream, Apply topically 2 times daily to affected area, Disp: 15 g, Rfl: 0    Respiratory Therapy Supplies (NEBULIZER/TUBING/MOUTHPIECE) KIT, 1 kit by Does not apply route daily as needed (wheezing, shortness of breath), Disp: 1 kit, Rfl: 0    albuterol (PROVENTIL) (2.5 MG/3ML) 0.083% nebulizer solution, Take 3 mLs by nebulization every 6 hours as needed for Wheezing or Shortness of Breath, Disp: 120 each, Rfl: 0      SOCIAL HISTORY     Social History     Social History Narrative    Not on file     Social History     Tobacco Use    Smoking status: Every Day     Packs/day: 1.00     Years: 16.00     Pack years: 16.00     Types: Cigarettes    Smokeless tobacco: Never   Substance Use Topics    Alcohol use: Yes    Drug use: Yes     Types: Marijuana (Weed)     Comment: has med card last used few hours ago         ALLERGIES     Allergies   Allergen Reactions    Other Other (See Comments)     Tangent surgical scrub          FAMILY HISTORY     Family History   Problem Relation Age of Onset    Osteoarthritis Mother     Arthritis Mother     Migraines Mother     Heart Disease Mother     Alcohol Abuse Mother     Osteoarthritis Father     Obesity Father     Arthritis Father     Alcohol Abuse Father     Alcohol Abuse Sister          PREVIOUS RECORDS   Previous records reviewed:  prior notes from Rheumatology and ED reviewed . PHYSICAL EXAM     ED Triage Vitals   BP Temp Temp src Pulse Resp SpO2 Height Weight   -- -- -- -- -- -- -- --     Initial vital signs and nursing assessment reviewed and normal. Body mass index is 35.15 kg/m². Pulsoximetry is normal per my interpretation. Additional Vital Signs:  Vitals:    10/24/22 0351   BP: 126/77   Pulse: 72   Resp: 18   Temp: (!) 96.3 °F (35.7 °C)   SpO2: 98%       Physical Exam  Vitals and nursing note reviewed. Constitutional:       General: She is not in acute distress. Appearance: Normal appearance. She is normal weight. She is not ill-appearing, toxic-appearing or diaphoretic. HENT:      Head: Normocephalic and atraumatic. Nose: Nose normal.      Mouth/Throat:      Mouth: Mucous membranes are moist.      Pharynx: Oropharynx is clear. No oropharyngeal exudate. Tonsils: No tonsillar exudate. Eyes:      Conjunctiva/sclera: Conjunctivae normal.   Cardiovascular:      Rate and Rhythm: Normal rate and regular rhythm. Pulses: Normal pulses. Heart sounds: Normal heart sounds. Pulmonary:      Effort: Pulmonary effort is normal.      Breath sounds: Normal breath sounds. Comments: Coughing with inspiration  Abdominal:      General: Abdomen is flat. Bowel sounds are normal.      Palpations: Abdomen is soft. Tenderness: There is no abdominal tenderness. Musculoskeletal:      Cervical back: Normal range of motion. Skin:     General: Skin is warm and dry. Capillary Refill: Capillary refill takes less than 2 seconds. Neurological:      Mental Status: She is alert and oriented to person, place, and time. MEDICAL DECISION MAKING   Initial Differential Diagnosis: (includes but is not limited to)  Upper respiratory tract infection  Pneumonia  COVID-19  Influenza   Streptococcal (less likely as no exudate on exam and age)  Urinary tract infection vs interstitial cystitis    Plan:   Swabs for COVID-19 and Influenza  Chest x-ray  Urinalysis    Summary:  Chest x-ray, radiologist recommends a CT chest with contrast in the emergency department for possible shadow versus pulmonary lesion. This was performed it does not show any suspicious findings in the lungs. Patient be discharged home, recommended follow-up with her primary care provider. Her symptoms are likely from a viral upper respiratory infection and she does not require antibiotics. ED RESULTS   Laboratory results:  Labs Reviewed   COVID-19 & INFLUENZA COMBO   URINALYSIS WITH REFLEX TO CULTURE       Radiologic studies results:  CT CHEST W CONTRAST   Final Result       1. Normally aerated lungs. 2. No suspicious findings in the chest.               **This report has been created using voice recognition software. It may contain minor errors which are inherent in voice recognition technology. **      Final report electronically signed by Dr. Herve Pineda on 10/24/2022 7:25 AM      XR CHEST (2 VW)   Final Result   1.  A faint 1.5 cm opacity projects over the mid thoracic spine on the    lateral view. This might be due to confluence of shadows. CT scan of the    chest in the ER he is advised to exclude a pulmonary lesion. 2. No acute infiltrate or pleural effusion. This document has been electronically signed by: Magdaleno Mcgovern M.D. on    10/24/2022 05:22 AM          ED Medications administered this visit:   Medications   iopamidol (ISOVUE-370) 76 % injection 80 mL (80 mLs IntraVENous Given 10/24/22 0607)         ED COURSE          Strict return precautions and follow up instructions were discussed with the patient prior to discharge, with which the patient agrees. PROCEDURES   Procedures      MEDICATION CHANGES     Discharge Medication List as of 10/24/2022  7:48 AM            FINAL DISPOSITION     Final diagnoses:   Viral upper respiratory tract infection       Condition: condition: fair  Dispo: Discharge to home      This transcription was electronically signed. Parts of this transcriptions may have been dictated by use of voice recognition software and electronically transcribed, and parts may have been transcribed with the assistance of an ED scribe. The transcription may contain errors not detected in proofreading. Please refer to my supervising physician's documentation if my documentation differs.     Electronically Signed: Liliane Stewart MD, 10/24/22, 9:20 AM         Mendel Curie, MD  Resident  10/24/22 1450

## 2022-10-27 ENCOUNTER — HOSPITAL ENCOUNTER (OUTPATIENT)
Dept: PHYSICAL THERAPY | Age: 42
Setting detail: THERAPIES SERIES
Discharge: HOME | End: 2022-10-27
Payer: COMMERCIAL

## 2022-10-27 PROCEDURE — 97110 THERAPEUTIC EXERCISES: CPT

## 2022-10-27 NOTE — DISCHARGE SUMMARY
[facility]  PHYSICAL THERAPY  [] EVALUATION  [] DAILY NOTE (LAND) [] DAILY NOTE (AQUATIC ) [] PROGRESS NOTE [x] DISCHARGE NOTE    [x] [facility] [place]   [] [place] [address]    [] [facility]   [] Stephanie Frausto    Date: 10/27/2022  Patient Name:  Katheryn Nails  : 1980  MRN: [id number]  CSN: [id number]    Referring Practitioner Chris Alvarado MD   Diagnosis Radiculopathy, lumbar region [M54.16]  Other low back pain [M54.59]  Spondylolisthesis, lumbar region [M43.16]    Treatment Diagnosis Chronic lumbar pain, core and hip weakness   Date of Evaluation 22    Additional Pertinent History Fibromyalgia, osteopenia in spine. Left shoulder injury 2022. Lumbar XR 22 - showing decreased retrolisthesis in Lumbar spine. 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 PT, OT and ST are allowed 30 visits each per calendar year. AQUATIC THERAPY COVERED:  Yes   MODALITIES COVERED:  Yes--Iontophoresis is not covered. Hot/Cold Packs are not covered.   TELEHEALTH COVERED:  Yes   Visit # 13, 3/10 for progress note    Visits Allowed: 30   Recertification Date:     Physician Follow-Up: Dr. Governor Owen 22 consult, MRI ordered, follow up    Also wanting to ask Dr. Governor Owen about thoracic pain/ flank pain in bra line   Physician Orders:    History of Present Illness: Pt presents with chronic back issues since she was 7years old when she fractured her tailbone. Pain is progressively worsening with age. Pt was told she needed a fusion but had hysterectomy first which did help. Over the past 10 years she had had intermittent episodes of pain. Pt was lifting and doing things she probably shouldn't be doing and felt back slip and can't seem to get it back in place. 6/14/22, CT scan showed stage 1 retrolithesis. Pt has had PT, epidurals and seen surgeons in past. Diagnostic testing showed degenerative changes in spine. Pt notes she has had recommendation to have breast reduction. Pt developed migraines recently and has been to ED and was prescribed Topomax. Pt had been doing cannibus and holistic treatment but now is back on prescribed medication. Pt has had multiple steroid injections and been on steroid packs. Pt notes heel spurs that affect walking. Pt wants to avoid back surgery. Pt may need left bicep surgery . Pain limits sitting and standing tolerance and unable to work. Pt unable to lift without pain. 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 Pt does yoga and tries to stretch. Pt notes core has been week since having her son 18 years ago. Pt has to complete PT to get MRI approved by insurance. SUBJECTIVE: Patient has been attending PT since July 28, feels good improvement from dry needling, stretching and strengthening. 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 This month she has been waiting for a lumbar MRI, it was finally performed yesterday and she plans to review the results with Dr. Jose G Wong. She is more concerned with pain in her thoracic and cervical regions, and even thinks she has a disc bulge in the thoracic area based on a chest CT that was performed this week.  Overall she feels her lower back is less severe than it used to be. OBJECTIVE:  TREATMENT   Precautions:  Precaution for Left breast lump - following with doctors - negative for CA    Pain: Low back and left buttock     \"X in shaded column indicates activity completed today     *\" next to exercise/intervention indicates progression   Modalities Parameters/  Location   Notes   Dry Needlin mm paravertebral points T8, T10, T12, L2, L4, L5, S1, S3   x  10 mins lumbar needling    Dry Needlin mm inferior gluteal point, superior cluneal points bilateral, (Not today - left hip ischial tuberosity )   x               Manual Therapy Time/Technique   Notes                                 Exercise/Intervention     Notes   NuStep warm up  5 mins Level 5                   Hooklying :          Posterior pelvic tilt  10x 5 sec      PPT + march  10x     Cues for technique   Bridge with band* 10x green     Sidelying: clamshell  10x Green*            REASSESSMENT: see goals below   x                                                                      Dry needling manual therapy: needle was inserted, pistoned, rotated, and/or coned to produce intramuscular mobilization. These techniques were used to restore functional range of motion, reduce muscle spasm and induce healing in the corresponding musculature. (02406)   Clean Technique was utilized today while applying Dry needling treatment. The treatment sites were cleaned with 70% solution of isopropyl alcohol. The therapist washed/sanitized their hands and utilized treatment gloves prior to inserting the needles. All needles were removed and discarded in the appropriate sharps container. \"    Specific Interventions Next Treatment: manual/massage, dry needling, NO mechanical traction, modalities, Astym and core strengthening for pain control    Activity/Treatment Tolerance:  [x]  Patient tolerated treatment well  []  Patient limited by fatigue  []  Patient limited by pain   []  Patient limited by medical complications  []  Other:     Assessment: Patient has made good progress with her pain in the lumbar and SI region. Not noticing as much painful popping or instability in her lumbar. Her best relief was about a month ago when she was coming consistently for aquatic therapy and dry needling alternating. However in the month of September and October she has only attended 4 appointments, and has started working part time as a  lifting heavy weights and standing longer durations. Plan to discharge from PT for lumbar spine, but encouraged that she could consult with doctor regarding dry needling for thoracic/ cervical if needed, as well as encouraged her to continue strengthening exercise. Goals    Patient Goal: Manage pain, avoid surgery if able    Short Term Goals: 6 weeks  Patient will hold bilateral SLR x30 seconds with <5/10 pain to improve core engagement with lifting and household chores. GOAL MET:  good abdominal brace with SLR. Discontinue Goal    Patient will improve bilateral hip extension strength to 4+/5 for ease walking and climbing stairs. GOAL NOT MET: produces pain in thoracic regions, 4/5 strength. Discontinue Goal    Patient will tolerate walking 60 minutes at grocery store with < 5/10 pain. GOAL MET:  patient able to walk several blocks with better tolerance. Discontinue Goal    Long Term Goals: 8 weeks  Patient will be independent and compliant with HEP to achieve above goals. GOAL MET:  performing lumbar stabilization HEP consistently. Discontinue Goal      Patient Education:   [x]  HEP/Education Completed: HEP handout given for core strengthening   [facility] Access Code: [email or web address]  []  No new Education completed  [x]  Reviewed Prior HEP      [x]  Patient verbalized and/or demonstrated understanding of education provided. []  Patient unable to verbalize and/or demonstrate understanding of education provided. Will continue education.   []  Barriers to learning:     PLAN:  Treatment Recommendations: Strengthening, Range of Motion, Functional Mobility Training, Gait Training, Stair Training, Manual Therapy - Soft Tissue Mobilization, Pain Management, Home Exercise Program, Patient Education, Integrative Dry Needling, Aquatics, and Modalities    []  Plan of care initiated. Plan to see patient 2 times per week for 8 weeks to address the treatment planned outlined above.   [x]  Continue with current plan of care  []  Modify plan of care as follows:    []  Hold pending physician visit  []  Discharge    Time In 845   Time Out 930   Timed Code Minutes: 35   Total Treatment Time: 45     Electronically Signed by: Esperanza Sampson PT

## 2023-01-17 ENCOUNTER — HOSPITAL ENCOUNTER (OUTPATIENT)
Dept: PHYSICAL THERAPY | Age: 43
Setting detail: THERAPIES SERIES
Discharge: HOME | End: 2023-01-17
Payer: COMMERCIAL

## 2023-01-17 ENCOUNTER — HOSPITAL ENCOUNTER (OUTPATIENT)
Dept: OCCUPATIONAL THERAPY | Age: 43
Setting detail: THERAPIES SERIES
Discharge: HOME | End: 2023-01-17
Payer: COMMERCIAL

## 2023-01-17 PROCEDURE — 97161 PT EVAL LOW COMPLEX 20 MIN: CPT

## 2023-01-17 PROCEDURE — 97110 THERAPEUTIC EXERCISES: CPT

## 2023-01-17 PROCEDURE — 97165 OT EVAL LOW COMPLEX 30 MIN: CPT

## 2023-01-17 NOTE — PROGRESS NOTES
** PLEASE SIGN, DATE AND TIME CERTIFICATION BELOW AND RETURN TO [facility] OUTPATIENT REHABILITATION (FAX #: [phone]). ATTEST/CO-SIGN IF ACCESSING VIA INBaileyu. THANK YOU.**    I certify that I have examined the patient below and determined that Physical Medicine and Rehabilitation service is necessary and that I approve the established plan of care for up to 90 days or as specifically noted. Attestation, signature or co-signature of physician indicates approval of certification requirements.    ________________________ ____________ __________  Physician Signature   Date   Time  [address] [facility]  PHYSICAL THERAPY  [x] EVALUATION  [] DAILY NOTE (LAND) [] DAILY NOTE (AQUATIC ) [] PROGRESS NOTE [] DISCHARGE NOTE    [x] [facility]   [] [place] [address]    [] [facility] [facility]   [] Karmen Lefort    Date: 2023  Patient Name:  Lazaro Murray  : 1980  MRN: [id number]  CSN: [id number]    Referring Practitioner Vargas Silverio MD   Diagnosis Radiculopathy, lumbar region [M54.16]    Treatment Diagnosis Chronic lumbar pain moving into thoracic region, core and LLE weakness   Date of Evaluation 23    Additional Pertinent History COPD, emphysema, fibromyalgia. Left shoulder SLAP tear, right RC pain. Functional Outcome Measure Used Modified Oswestry   Functional Outcome Score 26/50=52% (23)       Insurance: Primary: Payor: [address] [place] [address] [address] /  /  / ,   Secondary:    Authorization Information: PRECERTIFICATION REQUIRED: No  INSURANCE THERAPY BENEFIT: No pre-certification is needed. PT, OT and ST are allowed 30 visits each per calendar year. AQUATIC THERAPY COVERED:  Yes   MODALITIES COVERED:  Yes--Iontophoresis is not covered. Hot/Cold Packs are not covered.   TELEHEALTH COVERED:  Yes   Visit # 1, 1/10 for progress note   Visits Allowed: 30   Recertification Date: 56   Physician Follow-Up: Upon completion of therapy as needed   Physician Orders:    History of Present Illness: Pt had epidural injection 1/9/23 and its working better than she thought. Prior to injection she had nerve pain in left leg and that is better now. Pain is traveling up to thoracic spine and found to have degeneration. Pain aggravated with sitting and has to reposition for relief. Pain has been present since June. Pt has lost 40# is last year. Pt notes legs tend to drop when going up steps. Pt takes Cannabis and ibuprofen for pain control. Pt is cautious with ibuprofen d/t h/o stomach ulcers. Pt unable to work full shifts for a year, but working back up to it. Pt also having shoulder issues, which she is getting OT. SUBJECTIVE: see above    Social/Functional History and Current Status:  Medications and Allergies have been reviewed and are listed on Medical History Questionnaire. Dedrick Mayen lives  kids  in a multiple floor home with 6 stairs and a handrail to enter. Bedroom and bathroom on 2nd floor, bathroom on main level, laundry in basement. Task Previous Current   ADLs  Independent Modified Independent- difficulty getting in/out of tub   IADL's Independent Assistance Required with dishes, grocery shopping-pt trying to take lighter and smaller loads   Ambulation Independent Independent   Transfers Independent Independent   Recreation Independent-side business for PTSD Assistance Required   Community Integration Independent Independent   Driving Active  Active    Work Part-Time. Occupation: [address] 1 day a week   Part-Time.   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 [employer]     Objective:    OBSERVATION   Pain 8-9/10 mid to low back   Palpation No TTP in back   Sensation intact   Edema    Spinal Accessory Motion    Bed Mobility independent   Transfers independent   Ambulation Decreased pace, antalgic pattern with decreased stance on left   Stairs Reciprocating pattern with B handrail- feels better descending leading with RLE, moderate compensation d/t LLE weakness   Balance        POSTURE    No Deficit Deficit Comments   Forward Head x     Rounded Shoulders  x    Kyphosis  x    Lordosis      Lateral Shift      Scoliosis x     Iliac Crest x     PSIS x     ASIS      Leg Length Discrp x     Slumped Sitting          TRUNK RANGE OF MOTION   Flexion: WNL   Extension: 25% with pain   Lateral Flexion Left: 75%   Lateral Flexion Right: 75%   Rotation Left: WFL   Rotation Right: 90%   Trunk Range of Motion is [facility]  []     LOWER EXTREMITY RANGE OF MOTION    Left Right Comments   Hip Flexion knee to chest WFL with end range pain WFL    Hip Extension  [facility]    Hip ABDuction      Hip ADDuction      Hip Internal Rotation WFL with end range pain WFL    Hip External Rotation [facility] WFL    Hip Range of Motion is [facility]  []      Knee Flexion      Knee Extension      Knee Range of Motion is [facility]  [x] Hamstring tightness- equal B       LOWER EXTREMITY STRENGTH    Left Right Comments   Hip Flexion 3+ 5    Hip Abduction 3+ 5    Hip Adduction      Hip Extension 4+ 5    Hip External Rotation      Knee Flexion      Knee Extension      Ankle DF 5 5    Ankle PF      LE strength is WFL []      CORE STRENGTH   B SLR TEST:        20  seconds with pain in left hip, poor core engagement with arching of back       SPECIAL TESTS (+/-)    Left Right Comments   SLR  - -    90/90 Hamstring length      SKTC + -    DKTC      Slump      SI Compression/Distraction      MIK - -    FADIR + -    [facility]          TREATMENT   Precautions:    Pain: 8-9/10 mid to low back    \"X in shaded column indicates activity completed today    *\" next to exercise/intervention indicates progression   Modalities Parameters/  Location  Notes                     Manual Therapy Time/Technique  Notes                     Exercise/Intervention   Notes   Nustep              Hamstring stretch       Piriformis stretch              Posterior pelvic tilt                                            Specific Interventions Next Treatment: hamstring, piriformis, SKTC stretch on left, core strengthening/stabilization, LLE strengthening, progress to seated and standing as able    Activity/Treatment Tolerance:  [x]  Patient tolerated treatment well  []  Patient limited by fatigue  []  Patient limited by pain   []  Patient limited by medical complications  []  Other:     Assessment: 40year old presents with chronic lumbar pain radiating into thoracic region. Pt demos hip and hamstring tightness on left with core and left hip weakness affecting standing, sitting and walking tolerance, and stair negotiation. Pt will benefit from skilled PT to address listed impairments to get stronger to tolerate work. Body Structures/Functions/Activity Limitations: impaired ROM, impaired strength, pain, and abnormal gait  Prognosis: good      Goals    Patient Goal: Strengthen core, get back to gym    Short Term Goals: 4 weeks  Patient will demonstrate good core engagement and postural awareness during therapy session without cues for stabilization with prolonged activities. Patient will have pain free hip flexion, IR and hamstring ROM to improve muscle length for strengthening. Patient will improve left hip strength to 4/5 for stabilization when lifting and carrying groceries. Long Term Goals: 8 weeks  Patient will hold bilateral SLR x30 seconds with minimal pain to improve abdominal strength for work. Patient will improve left hip strength to 4+/5 for stabilization to negotiate stairs safer with 1 handrail. Patient will report less thoracic spine pain to tolerate sitting and driving >73 minutes. Patient will be independent and compliant with HEP to achieve above goals.        Patient Education:   [x]  HEP/Education Completed: Plan of Care, Goals  [facility] Access Code:  []  No new Education completed  []  Reviewed Prior HEP      [x]  Patient verbalized and/or demonstrated understanding of education provided. []  Patient unable to verbalize and/or demonstrate understanding of education provided. Will continue education. []  Barriers to learning:     PLAN:  Treatment Recommendations: Strengthening, Range of Motion, Stair Training, Manual Therapy - Soft Tissue Mobilization, Pain Management, Home Exercise Program, Patient Education, Integrative Dry Needling, Aquatics, and Modalities    [x]  Plan of care initiated. Plan to see patient 2-3 times per week for 8 weeks to address the treatment planned outlined above.   []  Continue with current plan of care  []  Modify plan of care as follows:    []  Hold pending physician visit  []  Discharge    Time In 0845   Time Out 0930   Timed Code Minutes: 0 min   Total Treatment Time: 45 min     Electronically Signed by: Christina Godoy PT
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
## 2023-01-19 ENCOUNTER — HOSPITAL ENCOUNTER (OUTPATIENT)
Dept: OCCUPATIONAL THERAPY | Age: 43
Setting detail: THERAPIES SERIES
Discharge: HOME | End: 2023-01-19
Payer: COMMERCIAL

## 2023-01-19 ENCOUNTER — HOSPITAL ENCOUNTER (OUTPATIENT)
Dept: PHYSICAL THERAPY | Age: 43
Setting detail: THERAPIES SERIES
Discharge: HOME | End: 2023-01-19
Payer: COMMERCIAL

## 2023-01-19 PROCEDURE — 97110 THERAPEUTIC EXERCISES: CPT

## 2023-01-19 PROCEDURE — 97140 MANUAL THERAPY 1/> REGIONS: CPT

## 2023-01-19 NOTE — PROGRESS NOTES
[address] [facility]  PHYSICAL THERAPY  [] EVALUATION  [x] DAILY NOTE (LAND) [] DAILY NOTE (AQUATIC ) [] PROGRESS NOTE [] DISCHARGE NOTE    [x] OUTPATIENT REHABILITATION CENTER [facility]   [] [place] [address]    [] [facility]   [] April Soni    Date: 2023  Patient Name:  Sapna Soto  : 1980  MRN: [id number]  CSN: [id number]    Referring Practitioner Oakley Siemens *   Diagnosis Radiculopathy, lumbar region [M54.16]    Treatment Diagnosis Chronic lumbar pain moving into thoracic region, core and LLE weakness   Date of Evaluation 23    Additional Pertinent History COPD, emphysema, fibromyalgia. Left shoulder SLAP tear, right RC pain. Functional Outcome Measure Used Modified Oswestry   Functional Outcome Score 26/50=52% (23)       Insurance: Primary: Payor: [address] [place] [address]  [address] /  /  / ,   Secondary:    Authorization Information: PRECERTIFICATION REQUIRED: No  INSURANCE THERAPY BENEFIT: No pre-certification is needed. PT, OT and ST are allowed 30 visits each per calendar year. AQUATIC THERAPY COVERED:  Yes   MODALITIES COVERED:  Yes--Iontophoresis is not covered. Hot/Cold Packs are not covered. TELEHEALTH COVERED:  Yes   Visit # 2, 2/10 for progress note   Visits Allowed: 30   Recertification Date: 23   Physician Follow-Up: Upon completion of therapy as needed   Physician Orders:    History of Present Illness: Pt had epidural injection 23 and its working better than she thought. Prior to injection she had nerve pain in left leg and that is better now. Pain is traveling up to thoracic spine and found to have degeneration. Pain aggravated with sitting and has to reposition for relief. Pain has been present since . Pt has lost 40# is last year. Pt notes legs tend to drop when going up steps. Pt takes Cannabis and ibuprofen for pain control. Pt is cautious with ibuprofen d/t h/o stomach ulcers.  Pt unable to work full shifts for a year, but working back up to it. Pt also having shoulder issues, which she is getting OT. SUBJECTIVE: Pt arrives 5 minutes late to appointment, reporting 7/10 low back and L hip/leg pain. Reports she was moving heavy objects in her house the other day that might have aggravated the pain. TREATMENT   Precautions:    Pain: 7/10 low back and L hip    \"X in shaded column indicates activity completed today    *\" next to exercise/intervention indicates progression   Modalities Parameters/  Location  Notes                     Manual Therapy Time/Technique  Notes                     Exercise/Intervention   Notes   Nustep           PT preferred rolled towel for lumbar support with all supine there ex   Hamstring stretch* 3x 20\" x    Piriformis stretch* 3x 20\" x    SKTC* 3x 20\" x    LTR* 5x  x           Posterior pelvic tilt* 10x 5\" x    PPT + March* 10x  x Pt completed one LE at a time, alt increased pain   PPT + BKFO* 10x  x B then single leg   PPT + SLR* 10x  x           Side-lying Clamshells - B* 10x  x      Specific Interventions Next Treatment: hamstring, piriformis, SKTC stretch on left, core strengthening/stabilization, LLE strengthening, progress to seated and standing as able    Activity/Treatment Tolerance:  [x]  Patient tolerated treatment well  []  Patient limited by fatigue  []  Patient limited by pain   []  Patient limited by medical complications  []  Other:     Assessment: Initiation of stretching and strengthening program as noted above. Pt noting pain intermittently throughout session due to \"clicking\" in L hip. Education and handout provided for HEP. Reports she feels better after stretching and completing exercises rating pain a 6/10. Goals    Patient Goal: Strengthen core, get back to gym    Short Term Goals: 4 weeks  Patient will demonstrate good core engagement and postural awareness during therapy session without cues for stabilization with prolonged activities. Patient will have pain free hip flexion, IR and hamstring ROM to improve muscle length for strengthening. Patient will improve left hip strength to 4/5 for stabilization when lifting and carrying groceries. Long Term Goals: 8 weeks  Patient will hold bilateral SLR x30 seconds with minimal pain to improve abdominal strength for work. Patient will improve left hip strength to 4+/5 for stabilization to negotiate stairs safer with 1 handrail. Patient will report less thoracic spine pain to tolerate sitting and driving >60 minutes. Patient will be independent and compliant with HEP to achieve above goals. Patient Education:   [x]  HEP/Education Completed: HEP  Alexis Rivero [address]  []  No new Education completed  []  Reviewed Prior HEP      [x]  Patient verbalized and/or demonstrated understanding of education provided. []  Patient unable to verbalize and/or demonstrate understanding of education provided. Will continue education. []  Barriers to learning:     PLAN:  Treatment Recommendations: Strengthening, Range of Motion, Stair Training, Manual Therapy - Soft Tissue Mobilization, Pain Management, Home Exercise Program, Patient Education, Integrative Dry Needling, Aquatics, and Modalities    []  Plan of care initiated. Plan to see patient 2-3 times per week for 8 weeks to address the treatment planned outlined above.   [x]  Continue with current plan of care  []  Modify plan of care as follows:    []  Hold pending physician visit  []  Discharge    Time In 0935   Time Out 1015   Timed Code Minutes: 40 min   Total Treatment Time: 40 min     Electronically Signed by: Dionicio Araiza PTA
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
## 2023-01-25 ENCOUNTER — APPOINTMENT (OUTPATIENT)
Dept: PHYSICAL THERAPY | Age: 43
End: 2023-01-25
Payer: COMMERCIAL

## 2023-01-25 ENCOUNTER — HOSPITAL ENCOUNTER (OUTPATIENT)
Dept: OCCUPATIONAL THERAPY | Age: 43
Setting detail: THERAPIES SERIES
End: 2023-01-25
Payer: COMMERCIAL

## 2023-02-01 ENCOUNTER — HOSPITAL ENCOUNTER (OUTPATIENT)
Dept: OCCUPATIONAL THERAPY | Age: 43
Setting detail: THERAPIES SERIES
Discharge: HOME | End: 2023-02-01
Payer: COMMERCIAL

## 2023-02-01 ENCOUNTER — HOSPITAL ENCOUNTER (OUTPATIENT)
Dept: PHYSICAL THERAPY | Age: 43
Setting detail: THERAPIES SERIES
Discharge: HOME | End: 2023-02-01
Payer: COMMERCIAL

## 2023-02-01 PROCEDURE — 97140 MANUAL THERAPY 1/> REGIONS: CPT

## 2023-02-01 PROCEDURE — 97110 THERAPEUTIC EXERCISES: CPT
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
## 2023-02-01 NOTE — PROGRESS NOTES
[facility]  PHYSICAL THERAPY  [] EVALUATION  [x] DAILY NOTE (LAND) [] DAILY NOTE (AQUATIC ) [] PROGRESS NOTE [] DISCHARGE NOTE    [x] OUTPATIENT REHABILITATION CENTER [facility]   [] [place] [address]    [] [facility]   [] Hayder Gibbons    Date: 2023  Patient Name:  Karissa Obrien  : 1980  MRN: [id number]  CSN: [id number]    Referring Practitioner Caitlyn Ellington *   Diagnosis Radiculopathy, lumbar region [M54.16]    Treatment Diagnosis Chronic lumbar pain moving into thoracic region, core and LLE weakness   Date of Evaluation 23    Additional Pertinent History COPD, emphysema, fibromyalgia. Left shoulder SLAP tear, right RC pain. Functional Outcome Measure Used Modified Oswestry   Functional Outcome Score 26/50=52% (23)       Insurance: Primary: Payor: [address] [place] [address]  [address] /  /  / ,   Secondary:    Authorization Information: PRECERTIFICATION REQUIRED: No  INSURANCE THERAPY BENEFIT: No pre-certification is needed. PT, OT and ST are allowed 30 visits each per calendar year. AQUATIC THERAPY COVERED:  Yes   MODALITIES COVERED:  Yes--Iontophoresis is not covered. Hot/Cold Packs are not covered. TELEHEALTH COVERED:  Yes   Visit # 3, 3/10 for progress note   Visits Allowed: 30   Recertification Date:    Physician Follow-Up: Upon completion of therapy as needed   Physician Orders:    History of Present Illness: Pt had epidural injection 23 and its working better than she thought. Prior to injection she had nerve pain in left leg and that is better now. Pain is traveling up to thoracic spine and found to have degeneration. Pain aggravated with sitting and has to reposition for relief. Pain has been present since . Pt has lost 40# is last year. Pt notes legs tend to drop when going up steps. Pt takes Cannabis and ibuprofen for pain control. Pt is cautious with ibuprofen d/t h/o stomach ulcers.  Pt unable to work full shifts for a year, but working back up to it. Pt also having shoulder issues, which she is getting OT. SUBJECTIVE: Pt reports left hip is bothering her more today than low back. Hip is affecting her gait pattern. Pt has been compliant with stretches at home. Pt notes cold weather doesn't help her feel any better. Pt notes nerve pain in left lateral hip/anterior thigh, with catching in her hip that loosens up with stretches and activity. Pt feel weak in postural muscles, having pain with trying to sit erect. OBJECTIVE:  TREATMENT   Precautions:    Pain: 4/10 low back and 8/10 L hip    \"X in shaded column indicates activity completed today    *\" next to exercise/intervention indicates progression   Modalities Parameters/  Location  Notes                     Manual Therapy Time/Technique  Notes                     Exercise/Intervention   Notes   Nustep              Hamstring stretch 3x 20\" x With strap (given to pt for home)   Piriformis stretch 3x 20\" x With strap   SKTC 3x 20\" x With strap   LTR 10* 5 sec x           Posterior pelvic tilt 10x 5\" x    PPT + March 10x  x 5 alternating, then 5 single leg   PPT + bent knee fall out bilat and unilat 10x  x    PPT + SLR 10x  x    PPT + hip adduction ball squeeze* 10x5 sec  x    Side-lying Clamshells - B 10x  x                    Specific Interventions Next Treatment: hamstring, piriformis, SKTC stretch on left, core strengthening/stabilization, LLE strengthening, progress to seated and standing as able    Activity/Treatment Tolerance:  [x]  Patient tolerated treatment well  []  Patient limited by fatigue  []  Patient limited by pain   []  Patient limited by medical complications  []  Other:     Assessment: Pt felt she got a better stretch using strap to complete them. Added hip adduction squeeze with pelvic tilt and progressed reps with LTR. Pt had popping in right hip with clamshells but able to eliminate it with smaller range.  Pt notes feeling tight but better overall at end of session. Goals    Patient Goal: Strengthen core, get back to gym    Short Term Goals: 4 weeks  Patient will demonstrate good core engagement and postural awareness during therapy session without cues for stabilization with prolonged activities. Patient will have pain free hip flexion, IR and hamstring ROM to improve muscle length for strengthening. Patient will improve left hip strength to 4/5 for stabilization when lifting and carrying groceries. Long Term Goals: 8 weeks  Patient will hold bilateral SLR x30 seconds with minimal pain to improve abdominal strength for work. Patient will improve left hip strength to 4+/5 for stabilization to negotiate stairs safer with 1 handrail. Patient will report less thoracic spine pain to tolerate sitting and driving >20 minutes. Patient will be independent and compliant with HEP to achieve above goals. Patient Education:   [x]  HEP/Education Completed: bring strap back to each session  Alexis Clark  []  No new Education completed  []  Reviewed Prior HEP      [x]  Patient verbalized and/or demonstrated understanding of education provided. []  Patient unable to verbalize and/or demonstrate understanding of education provided. Will continue education. []  Barriers to learning:     PLAN:  Treatment Recommendations: Strengthening, Range of Motion, Stair Training, Manual Therapy - Soft Tissue Mobilization, Pain Management, Home Exercise Program, Patient Education, Integrative Dry Needling, Aquatics, and Modalities    []  Plan of care initiated. Plan to see patient 2-3 times per week for 8 weeks to address the treatment planned outlined above.   [x]  Continue with current plan of care  []  Modify plan of care as follows:    []  Hold pending physician visit  []  Discharge    Time In 1019   Time Out 1059   Timed Code Minutes: 40 min   Total Treatment Time: 40 min     Electronically Signed by: Adama Escobar, PT

## 2023-02-03 ENCOUNTER — HOSPITAL ENCOUNTER (OUTPATIENT)
Dept: OCCUPATIONAL THERAPY | Age: 43
Setting detail: THERAPIES SERIES
Discharge: HOME | End: 2023-02-03
Payer: COMMERCIAL

## 2023-02-03 PROCEDURE — 97110 THERAPEUTIC EXERCISES: CPT

## 2023-02-03 PROCEDURE — 97140 MANUAL THERAPY 1/> REGIONS: CPT
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
## 2023-02-06 ENCOUNTER — HOSPITAL ENCOUNTER (OUTPATIENT)
Dept: OCCUPATIONAL THERAPY | Age: 43
Setting detail: THERAPIES SERIES
Discharge: HOME | End: 2023-02-06
Payer: COMMERCIAL

## 2023-02-06 ENCOUNTER — HOSPITAL ENCOUNTER (OUTPATIENT)
Dept: PHYSICAL THERAPY | Age: 43
Setting detail: THERAPIES SERIES
Discharge: HOME | End: 2023-02-06
Payer: COMMERCIAL

## 2023-02-06 PROCEDURE — 97110 THERAPEUTIC EXERCISES: CPT

## 2023-02-06 PROCEDURE — 97140 MANUAL THERAPY 1/> REGIONS: CPT

## 2023-02-06 NOTE — PROGRESS NOTES
[facility]  PHYSICAL THERAPY  [] EVALUATION  [x] DAILY NOTE (LAND) [] DAILY NOTE (AQUATIC ) [] PROGRESS NOTE [] DISCHARGE NOTE    [x] OUTPATIENT REHABILITATION [facility]   [] [place] [address]    [] [facility]   [] Brennon Serrato    Date: 2023  Patient Name:  Missy Parmar  : 1980  MRN: [id number]  CSN: [id number]    Referring Practitioner Edgard Estes *   Diagnosis Radiculopathy, lumbar region [M54.16]    Treatment Diagnosis Chronic lumbar pain moving into thoracic region, core and LLE weakness   Date of Evaluation 23    Additional Pertinent History COPD, emphysema, fibromyalgia. Left shoulder SLAP tear, right RC pain. H/o Lyme's disease. Functional Outcome Measure Used Modified Oswestry   Functional Outcome Score 50=52% (23)       Insurance: Primary: Payor: [address] [place] [address]  [address] /  /  / ,   Secondary:    Authorization Information: PRECERTIFICATION REQUIRED: No  INSURANCE THERAPY BENEFIT: No pre-certification is needed. PT, OT and ST are allowed 30 visits each per calendar year. AQUATIC THERAPY COVERED:  Yes   MODALITIES COVERED:  Yes--Iontophoresis is not covered. Hot/Cold Packs are not covered. TELEHEALTH COVERED:  Yes   Visit # 4, 410 for progress note   Visits Allowed: 30   Recertification Date:    Physician Follow-Up: Upon completion of therapy as needed   Physician Orders:    History of Present Illness: Pt had epidural injection 23 and its working better than she thought. Prior to injection she had nerve pain in left leg and that is better now. Pain is traveling up to thoracic spine and found to have degeneration. Pain aggravated with sitting and has to reposition for relief. Pain has been present since . Pt has lost 40# is last year. Pt notes legs tend to drop when going up steps. Pt takes Cannabis and ibuprofen for pain control. Pt is cautious with ibuprofen d/t h/o stomach ulcers.  Pt unable to work full shifts for a year, but working back up to it. Pt also having shoulder issues, which she is getting OT. SUBJECTIVE: Pt worked 2 shifts within 24 hours over the weekend. Pt felt something drop on left side yesterday. Pt has been trying to stretch more. Pt has sharp pain, cramp and bruise on front of him and doesn't recall hitting it on anything. Pt notes she broke out in hives after removing tape from OT. OBJECTIVE:  TREATMENT   Precautions:    Pain: 7/10  L low back and hip    \"X in shaded column indicates activity completed today    *\" next to exercise/intervention indicates progression   Modalities Parameters/  Location  Notes                     Manual Therapy Time/Technique  Notes                     Exercise/Intervention   Notes   Nustep              Hamstring stretch 3x 20\" x With strap (given to pt for home)   Piriformis stretch 3x 20\" x With strap   SKTC 3x 20\" x With strap   LTR 10 5 sec x    Sidelying open book thoracic rotation stretch* 5x10 sec  x Limited rotation to left d/t shoulder issues   Dive stretch* 5x10 sec  x           Posterior pelvic tilt 10x 5\" x    PPT + March alternating 10x  x    PPT + bent knee fall out bilat and unilat 10x  x    PPT + SLR 10x  x    PPT + hip adduction ball squeeze 10x5 sec  x    Side-lying Clamshells - B 10x  x           Seated with abdominal bracing:       March* 10  x    LAQ* 10x3 sec  x             Specific Interventions Next Treatment: hamstring, piriformis, SKTC stretch on left, core strengthening/stabilization, LLE strengthening, progress to seated and standing as able    Activity/Treatment Tolerance:  [x]  Patient tolerated treatment well  []  Patient limited by fatigue  []  Patient limited by pain   []  Patient limited by medical complications  []  Other:     Assessment: Introduced 2 thoracic stretches and 2 seated exercises as noted above. Pt had limited left thoracic rotation d/t shoulder pain/tightness.  Pt requires occasional cues for core engagement during session. No change in pain level at end of session. Goals    Patient Goal: Strengthen core, get back to gym    Short Term Goals: 4 weeks  Patient will demonstrate good core engagement and postural awareness during therapy session without cues for stabilization with prolonged activities. Patient will have pain free hip flexion, IR and hamstring ROM to improve muscle length for strengthening. Patient will improve left hip strength to 4/5 for stabilization when lifting and carrying groceries. Long Term Goals: 8 weeks  Patient will hold bilateral SLR x30 seconds with minimal pain to improve abdominal strength for work. Patient will improve left hip strength to 4+/5 for stabilization to negotiate stairs safer with 1 handrail. Patient will report less thoracic spine pain to tolerate sitting and driving >36 minutes. Patient will be independent and compliant with HEP to achieve above goals. Patient Education:   [x]  HEP/Education Completed: sidelying thoracic rotation and dive stretches  Alexis lCark  []  No new Education completed  [x]  Reviewed Prior HEP      [x]  Patient verbalized and/or demonstrated understanding of education provided. []  Patient unable to verbalize and/or demonstrate understanding of education provided. Will continue education. []  Barriers to learning:     PLAN:  Treatment Recommendations: Strengthening, Range of Motion, Stair Training, Manual Therapy - Soft Tissue Mobilization, Pain Management, Home Exercise Program, Patient Education, Integrative Dry Needling, Aquatics, and Modalities    []  Plan of care initiated. Plan to see patient 2-3 times per week for 8 weeks to address the treatment planned outlined above.   [x]  Continue with current plan of care  []  Modify plan of care as follows:    []  Hold pending physician visit  []  Discharge    Time In 0940   Time Out 1026   Timed Code Minutes: 46 min   Total Treatment Time: 46 min     Electronically Signed by: Eladia Henderson, PT
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
## 2023-02-08 ENCOUNTER — APPOINTMENT (OUTPATIENT)
Dept: PHYSICAL THERAPY | Age: 43
End: 2023-02-08
Payer: COMMERCIAL

## 2023-02-08 ENCOUNTER — APPOINTMENT (OUTPATIENT)
Dept: OCCUPATIONAL THERAPY | Age: 43
End: 2023-02-08
Payer: COMMERCIAL

## 2023-02-10 ENCOUNTER — HOSPITAL ENCOUNTER (OUTPATIENT)
Dept: PHYSICAL THERAPY | Age: 43
Setting detail: THERAPIES SERIES
Discharge: HOME | End: 2023-02-10
Payer: COMMERCIAL

## 2023-02-10 ENCOUNTER — HOSPITAL ENCOUNTER (OUTPATIENT)
Dept: OCCUPATIONAL THERAPY | Age: 43
Setting detail: THERAPIES SERIES
Discharge: HOME | End: 2023-02-10
Payer: COMMERCIAL

## 2023-02-10 PROCEDURE — 97110 THERAPEUTIC EXERCISES: CPT

## 2023-02-10 PROCEDURE — 97140 MANUAL THERAPY 1/> REGIONS: CPT
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
## 2023-02-10 NOTE — PROGRESS NOTES
[address] [facility]  PHYSICAL THERAPY  [] EVALUATION  [x] DAILY NOTE (LAND) [] DAILY NOTE (AQUATIC ) [] PROGRESS NOTE [] DISCHARGE NOTE    [x] OUTPATIENT REHABILITATION CENTER [facility]   [] [place] [address]    [] [facility]   [] [facility]    Date: 2/10/2023  Patient Name:  Lorelee Romberg  : 1980  MRN: [id number]  CSN: [id number]    Referring Practitioner Claudia Gruber *   Diagnosis Radiculopathy, lumbar region [M54.16]    Treatment Diagnosis Chronic lumbar pain moving into thoracic region, core and LLE weakness   Date of Evaluation 23    Additional Pertinent History COPD, emphysema, fibromyalgia. Left shoulder SLAP tear, right RC pain. H/o Lyme's disease. Functional Outcome Measure Used Modified Oswestry   Functional Outcome Score 26/50=52% (23)       Insurance: Primary: Payor: [address] [place] [address]  [address] /  /  / ,   Secondary:    Authorization Information: PRECERTIFICATION REQUIRED: No  INSURANCE THERAPY BENEFIT: No pre-certification is needed. PT, OT and ST are allowed 30 visits each per calendar year. AQUATIC THERAPY COVERED:  Yes   MODALITIES COVERED:  Yes--Iontophoresis is not covered. Hot/Cold Packs are not covered. TELEHEALTH COVERED:  Yes   Visit # 5, 5/10 for progress note   Visits Allowed: 30   Recertification Date:    Physician Follow-Up: Upon completion of therapy as needed   Physician Orders:    History of Present Illness: Pt had epidural injection 23 and its working better than she thought. Prior to injection she had nerve pain in left leg and that is better now. Pain is traveling up to thoracic spine and found to have degeneration. Pain aggravated with sitting and has to reposition for relief. Pain has been present since . Pt has lost 40# is last year. Pt notes legs tend to drop when going up steps. Pt takes Cannabis and ibuprofen for pain control. Pt is cautious with ibuprofen d/t h/o stomach ulcers. Pt unable to work full shifts for a year, but working back up to it. Pt also having shoulder issues, which she is getting OT. SUBJECTIVE: Pt reports exercises have been going well. She has been able to do more and more at home. Is a little sore today but attributes this to being at chiropractor Wednesday and increased activity at home. Compliant with HEP, pt questioning trialing dry needling in future appointments. OBJECTIVE:  TREATMENT   Precautions:    Pain: \"sore\" 8/10  L low back and hip    \"X in shaded column indicates activity completed today    *\" next to exercise/intervention indicates progression   Modalities Parameters/  Location  Notes                     Manual Therapy Time/Technique  Notes                     Exercise/Intervention   Notes   Nustep              Hamstring stretch 3x 20\" x With strap (given to pt for home)   Piriformis stretch 3x 20\" x With strap   SKTC 3x 20\" x With strap   LTR 10 5 sec x    Sidelying open book thoracic rotation stretch 5x10 sec  x Limited rotation to left d/t shoulder issues   Dive stretch 5x10 sec  x           Posterior pelvic tilt 10x 5\" x    PPT + March alternating 10x  x    PPT + bent knee fall out bilat and unilat 10x  x    PPT + SLR 10x  x    PPT + hip adduction ball squeeze 10x5 sec  x    Side-lying Clamshells - B 10x  x           Seated with abdominal bracing:       March 10  x    LAQ 10x3 sec  x             Specific Interventions Next Treatment: hamstring, piriformis, SKTC stretch on left, core strengthening/stabilization, LLE strengthening, progress to seated and standing as able    Activity/Treatment Tolerance:  [x]  Patient tolerated treatment well  []  Patient limited by fatigue  []  Patient limited by pain   []  Patient limited by medical complications  []  Other:     Assessment:   Program completed as noted above. Cuing for abdominal bracing and improved exercise technique.  Pt with some increased discomfort in L hip with exercises limiting number of reps for a few exercises. Reviewed thoracic stretches for HEP. Goals    Patient Goal: Strengthen core, get back to gym    Short Term Goals: 4 weeks  Patient will demonstrate good core engagement and postural awareness during therapy session without cues for stabilization with prolonged activities. Patient will have pain free hip flexion, IR and hamstring ROM to improve muscle length for strengthening. Patient will improve left hip strength to 4/5 for stabilization when lifting and carrying groceries. Long Term Goals: 8 weeks  Patient will hold bilateral SLR x30 seconds with minimal pain to improve abdominal strength for work. Patient will improve left hip strength to 4+/5 for stabilization to negotiate stairs safer with 1 handrail. Patient will report less thoracic spine pain to tolerate sitting and driving >95 minutes. Patient will be independent and compliant with HEP to achieve above goals. Patient Education:   []  HEP/Education Completed: sidelying thoracic rotation and dive stretches  Alexis Rivero [address]  []  No new Education completed  [x]  Reviewed Prior HEP      [x]  Patient verbalized and/or demonstrated understanding of education provided. []  Patient unable to verbalize and/or demonstrate understanding of education provided. Will continue education. []  Barriers to learning:     PLAN:  Treatment Recommendations: Strengthening, Range of Motion, Stair Training, Manual Therapy - Soft Tissue Mobilization, Pain Management, Home Exercise Program, Patient Education, Integrative Dry Needling, Aquatics, and Modalities    []  Plan of care initiated. Plan to see patient 2-3 times per week for 8 weeks to address the treatment planned outlined above.   [x]  Continue with current plan of care  []  Modify plan of care as follows:    []  Hold pending physician visit  []  Discharge    Time In 0932   Time Out 1015   Timed Code Minutes: 43 min   Total Treatment Time: 43 min Electronically Signed by: Barrera Cruz, PTA

## 2023-02-15 ENCOUNTER — APPOINTMENT (OUTPATIENT)
Dept: PHYSICAL THERAPY | Age: 43
End: 2023-02-15
Payer: COMMERCIAL

## 2023-02-15 ENCOUNTER — HOSPITAL ENCOUNTER (OUTPATIENT)
Dept: OCCUPATIONAL THERAPY | Age: 43
Setting detail: THERAPIES SERIES
Discharge: HOME | End: 2023-02-15
Payer: COMMERCIAL

## 2023-02-15 PROCEDURE — 97110 THERAPEUTIC EXERCISES: CPT
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
## 2023-02-17 ENCOUNTER — APPOINTMENT (OUTPATIENT)
Dept: PHYSICAL THERAPY | Age: 43
End: 2023-02-17
Payer: COMMERCIAL

## 2023-02-17 ENCOUNTER — HOSPITAL ENCOUNTER (OUTPATIENT)
Dept: OCCUPATIONAL THERAPY | Age: 43
Setting detail: THERAPIES SERIES
End: 2023-02-17
Payer: COMMERCIAL

## 2023-02-28 ENCOUNTER — HOSPITAL ENCOUNTER (OUTPATIENT)
Dept: OCCUPATIONAL THERAPY | Age: 43
Setting detail: THERAPIES SERIES
Discharge: HOME | End: 2023-02-28
Payer: COMMERCIAL

## 2023-02-28 PROCEDURE — 97140 MANUAL THERAPY 1/> REGIONS: CPT

## 2023-02-28 PROCEDURE — 97110 THERAPEUTIC EXERCISES: CPT
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
## 2023-03-06 ENCOUNTER — HOSPITAL ENCOUNTER (OUTPATIENT)
Dept: PHYSICAL THERAPY | Age: 43
Setting detail: THERAPIES SERIES
Discharge: HOME | End: 2023-03-06
Payer: COMMERCIAL

## 2023-03-06 PROCEDURE — 97110 THERAPEUTIC EXERCISES: CPT

## 2023-03-06 NOTE — PROGRESS NOTES
[address] [facility]  PHYSICAL THERAPY  [] EVALUATION  [] DAILY NOTE (LAND) [] DAILY NOTE (AQUATIC ) [x] PROGRESS NOTE [] DISCHARGE NOTE    [x] [facility] [place]   [] [place] [address]    [] [facility]   [] Mehreen Jade    Date: 3/6/2023  Patient Name:  Dennys Caballero  : 1980  MRN: [id number]  CSN: [id number]    Referring Practitioner Larence Sandifer *   Diagnosis Radiculopathy, lumbar region [M54.16]    Treatment Diagnosis Chronic lumbar pain moving into thoracic region, core and LLE weakness   Date of Evaluation 23    Additional Pertinent History COPD, emphysema, fibromyalgia. Left shoulder SLAP tear, right RC pain. H/o Lyme's disease. Functional Outcome Measure Used Modified Oswestry   Functional Outcome Score 50=52% (23) =42% (3/6/23)      Insurance: Primary: Payor: [address] [place] [address]  [address] /  /  / ,   Secondary:    Authorization Information: PRECERTIFICATION REQUIRED: No  INSURANCE THERAPY BENEFIT: No pre-certification is needed. PT, OT and ST are allowed 30 visits each per calendar year. AQUATIC THERAPY COVERED:  Yes   MODALITIES COVERED:  Yes--Iontophoresis is not covered. Hot/Cold Packs are not covered. TELEHEALTH COVERED:  Yes   Visit # 6, 6/10 for progress note   Visits Allowed: 30   Recertification Date: 37   Physician Follow-Up: 3/14/23; Dr. Coleman Garcia for left shoulder 3/7/23   Physician Orders:    History of Present Illness: Pt had epidural injection 23 and its working better than she thought. Prior to injection she had nerve pain in left leg and that is better now. Pain is traveling up to thoracic spine and found to have degeneration. Pain aggravated with sitting and has to reposition for relief. Pain has been present since . Pt has lost 40# is last year. Pt notes legs tend to drop when going up steps. Pt takes Cannabis and ibuprofen for pain control.  Pt is cautious with ibuprofen d/t h/o stomach ulcers. Pt unable to work full shifts for a year, but working back up to it. Pt also having shoulder issues, which she is getting OT. SUBJECTIVE: Pt reports back is feeling better today, as the rain made the pain worse. Pt c/o shoulder pain more than anything today. Pt feels better since starting therapy but unable to get relief when she has flare ups. Pt notes sitting on harder surfaces increases pain. Pt feels more flexible but still has weakness. Pt worried about how she is going to mow her yard this spring. Pt plans to start doing more of an exercise regimen and wants to stop smoking. Pt was able to go 3 weeks without icing back.      OBJECTIVE:  TREATMENT   Precautions:    Pain:  6/10 mid back, 9/10 left shoulder    \"X in shaded column indicates activity completed today    *\" next to exercise/intervention indicates progression   Modalities Parameters/  Location  Notes                     Manual Therapy Time/Technique  Notes                     Exercise/Intervention   Notes   Nustep              Hamstring stretch 3x 20\" x With strap (given to pt for home)   Piriformis stretch 3x 20\" x With strap   SKTC 3x 20\" x With strap   LTR 10 5 sec x    Sidelying open book thoracic rotation stretch 5x10 sec  x    Dive stretch 5x10 sec             Posterior pelvic tilt 10x 5\"     PPT + March alternating 15*x  x    PPT + bent knee fall out bilat and unilat 10x      PPT + SLR 2*x10x3 sec  x    PPT + hip adduction ball squeeze 10x5 sec      Side-lying Clamshells - B 10x             Seated with abdominal bracing:       March 10      LAQ 10x3 sec               Specific Interventions Next Treatment: hamstring, piriformis, SKTC stretch on left, core strengthening/stabilization, LLE strengthening, progress to seated and standing as able    Activity/Treatment Tolerance:  [x]  Patient tolerated treatment well  []  Patient limited by fatigue  []  Patient limited by pain   []  Patient limited by medical complications  []  Other:     Assessment: Pt demos good progress toward goals, meeting 1 short term goal. Flexibility has improved but right hip is tighter than left. Pt continues to have left hip and abdominal weakness. Modified Oswestry score decreased 5 points. Pt continues to have increased pain with sitting. Pt has good understanding of HEP and reviewed thoracic rotation stretch per pt request. Pt follows up with Dr. Deborah Aguiar next week and plans to discuss another injection. Pt would like to hold therapy until her follow up. Goals    Patient Goal: Strengthen core, get back to gym    Short Term Goals: 4 weeks  Patient will demonstrate good core engagement and postural awareness during therapy session without cues for stabilization with prolonged activities. GOAL NOT MET: Pt requires occasional cues for core engagement during exercises, pt verbalizes postural awareness with daily activities. Continue Goal  Patient will have pain free hip flexion, IR and hamstring ROM to improve muscle length for strengthening. GOAL MET:  stretching makes her feel better, no increased pain. Discontinue Goal  Patient will improve left hip strength to 4/5 for stabilization when lifting and carrying groceries. GOAL NOT MET: left hip flexion, extension 3+/5, ABD 4/5, difficulty lifting cases of water, but partially d/t left shoulder issues. Continue Goal      Long Term Goals: 8 weeks  Patient will hold bilateral SLR x30 seconds with minimal pain to improve abdominal strength for work. GOAL NOT MET: 16 seconds with pain and difficulty keeping abdominals engaged. Continue Goal  Patient will improve left hip strength to 4+/5 for stabilization to negotiate stairs safer with 1 handrail. GOAL NOT MET: see above. Continue Goal  Patient will report less thoracic spine pain to tolerate sitting and driving >25 minutes.  GOAL NOT MET: Pt c/o primarily of thoracic pain that worsens with sitting, especially hard, straight chairs, tolerating 30-60 minutes driving. Continue Goal  Patient will be independent and compliant with HEP to achieve above goals. GOAL MET:  Pt compliant with HEP daily. Continue Goal        Patient Education:   [x]  HEP/Education Completed: sidelying thoracic rotation, add another set to [address]  []  No new Education completed  [x]  Reviewed Prior HEP      [x]  Patient verbalized and/or demonstrated understanding of education provided. []  Patient unable to verbalize and/or demonstrate understanding of education provided. Will continue education. []  Barriers to learning:     PLAN:  Treatment Recommendations: Strengthening, Range of Motion, Stair Training, Manual Therapy - Soft Tissue Mobilization, Pain Management, Home Exercise Program, Patient Education, Integrative Dry Needling, Aquatics, and Modalities    []  Plan of care initiated. Plan to see patient 2-3 times per week for 8 weeks to address the treatment planned outlined above.   []  Continue with current plan of care  []  Modify plan of care as follows:    [x]  Hold pending physician visit  []  Discharge    Time In 0902   Time Out 0945   Timed Code Minutes: 43 min   Total Treatment Time: 43 min     Electronically Signed by: [place], PT

## 2023-03-24 ENCOUNTER — HOSPITAL ENCOUNTER (OUTPATIENT)
Age: 43
Discharge: HOME | End: 2023-03-24
Payer: COMMERCIAL

## 2023-03-24 ENCOUNTER — HOSPITAL ENCOUNTER (OUTPATIENT)
Dept: GENERAL RADIOLOGY | Age: 43
Discharge: HOME | End: 2023-03-24
Payer: COMMERCIAL

## 2023-03-24 DIAGNOSIS — Z01.818 PREOP EXAMINATION: ICD-10-CM

## 2023-03-24 DIAGNOSIS — M19.012 PRIMARY OSTEOARTHRITIS OF LEFT SHOULDER: ICD-10-CM

## 2023-03-24 DIAGNOSIS — Z87.891 SMOKING HX: ICD-10-CM

## 2023-03-24 LAB
ANION GAP SERPL CALC-SCNC: 8 MEQ/L (ref 8–16)
BASOPHILS ABSOLUTE: 0 THOU/MM3 (ref 0–0.1)
BASOPHILS NFR BLD AUTO: 0.3 %
BUN SERPL-MCNC: 23 MG/DL (ref 7–22)
CALCIUM SERPL-MCNC: 10.2 MG/DL (ref 8.5–10.5)
CHLORIDE SERPL-SCNC: 104 MEQ/L (ref 98–111)
CO2 SERPL-SCNC: 28 MEQ/L (ref 23–33)
CREAT SERPL-MCNC: 0.6 MG/DL (ref 0.4–1.2)
DEPRECATED RDW RBC AUTO: 44.2 FL (ref 35–45)
EKG ATRIAL RATE: 68 BPM
EKG P AXIS: 38 DEGREES
EKG P-R INTERVAL: 130 MS
EKG Q-T INTERVAL: 418 MS
EKG QRS DURATION: 90 MS
EKG QTC CALCULATION (BAZETT): 444 MS
EKG R AXIS: 72 DEGREES
EKG T AXIS: 59 DEGREES
EKG VENTRICULAR RATE: 68 BPM
EOSINOPHIL NFR BLD AUTO: 1.5 %
EOSINOPHILS ABSOLUTE: 0.1 THOU/MM3 (ref 0–0.4)
ERYTHROCYTE [DISTWIDTH] IN BLOOD BY AUTOMATED COUNT: 12.5 % (ref 11.5–14.5)
GFR SERPL CREATININE-BSD FRML MDRD: > 60 ML/MIN/1.73M2
GLUCOSE SERPL-MCNC: 85 MG/DL (ref 70–108)
HCT VFR BLD AUTO: 42.2 % (ref 37–47)
HGB BLD-MCNC: 13.8 GM/DL (ref 12–16)
IMM GRANULOCYTES # BLD AUTO: 0.03 THOU/MM3 (ref 0–0.07)
IMM GRANULOCYTES NFR BLD AUTO: 0.3 %
LYMPHOCYTES ABSOLUTE: 3.7 THOU/MM3 (ref 1–4.8)
LYMPHOCYTES NFR BLD AUTO: 40.3 %
MCH RBC QN AUTO: 31.5 PG (ref 26–33)
MCHC RBC AUTO-ENTMCNC: 32.7 GM/DL (ref 32.2–35.5)
MCV RBC AUTO: 96.3 FL (ref 81–99)
MONOCYTES ABSOLUTE: 0.6 THOU/MM3 (ref 0.4–1.3)
MONOCYTES NFR BLD AUTO: 6 %
MRSA DNA SPEC QL NAA+PROBE: NEGATIVE
NEUTROPHILS NFR BLD AUTO: 51.6 %
NRBC BLD AUTO-RTO: 0 /100 WBC
PLATELET # BLD AUTO: 257 THOU/MM3 (ref 130–400)
PMV BLD AUTO: 11.2 FL (ref 9.4–12.4)
POTASSIUM SERPL-SCNC: 4.5 MEQ/L (ref 3.5–5.2)
RBC # BLD AUTO: 4.38 MILL/MM3 (ref 4.2–5.4)
SEGMENTED NEUTROPHILS ABSOLUTE COUNT: 4.8 THOU/MM3 (ref 1.8–7.7)
SODIUM SERPL-SCNC: 140 MEQ/L (ref 135–145)
WBC # BLD AUTO: 9.3 THOU/MM3 (ref 4.8–10.8)

## 2023-03-24 PROCEDURE — 87641 MR-STAPH DNA AMP PROBE: CPT

## 2023-03-24 PROCEDURE — 85025 COMPLETE CBC W/AUTO DIFF WBC: CPT

## 2023-03-24 PROCEDURE — 71046 X-RAY EXAM CHEST 2 VIEWS: CPT

## 2023-03-24 PROCEDURE — 36415 COLL VENOUS BLD VENIPUNCTURE: CPT

## 2023-03-24 PROCEDURE — 80048 BASIC METABOLIC PNL TOTAL CA: CPT

## 2023-03-24 PROCEDURE — 93005 ELECTROCARDIOGRAM TRACING: CPT | Performed by: PHYSICIAN ASSISTANT

## 2023-04-17 ENCOUNTER — ANESTHESIA EVENT (OUTPATIENT)
Dept: OPERATING ROOM | Age: 43
End: 2023-04-17
Payer: COMMERCIAL

## 2023-04-17 ENCOUNTER — HOSPITAL ENCOUNTER (OUTPATIENT)
Age: 43
Setting detail: OUTPATIENT SURGERY
Discharge: HOME | End: 2023-04-17
Attending: ORTHOPAEDIC SURGERY | Admitting: ORTHOPAEDIC SURGERY
Payer: COMMERCIAL

## 2023-04-17 ENCOUNTER — ANESTHESIA (OUTPATIENT)
Dept: OPERATING ROOM | Age: 43
End: 2023-04-17
Payer: COMMERCIAL

## 2023-04-17 VITALS
OXYGEN SATURATION: 98 % | HEIGHT: 60 IN | SYSTOLIC BLOOD PRESSURE: 146 MMHG | TEMPERATURE: 97.2 F | WEIGHT: 187.2 LBS | DIASTOLIC BLOOD PRESSURE: 98 MMHG | HEART RATE: 77 BPM | RESPIRATION RATE: 18 BRPM | BODY MASS INDEX: 36.75 KG/M2

## 2023-04-17 DIAGNOSIS — G89.18 POSTOPERATIVE PAIN: Primary | ICD-10-CM

## 2023-04-17 PROCEDURE — 7100000001 HC PACU RECOVERY - ADDTL 15 MIN: Performed by: ORTHOPAEDIC SURGERY

## 2023-04-17 PROCEDURE — 3600000014 HC SURGERY LEVEL 4 ADDTL 15MIN: Performed by: ORTHOPAEDIC SURGERY

## 2023-04-17 PROCEDURE — 3700000000 HC ANESTHESIA ATTENDED CARE: Performed by: ORTHOPAEDIC SURGERY

## 2023-04-17 PROCEDURE — 6360000002 HC RX W HCPCS: Performed by: ORTHOPAEDIC SURGERY

## 2023-04-17 PROCEDURE — 2500000003 HC RX 250 WO HCPCS: Performed by: ORTHOPAEDIC SURGERY

## 2023-04-17 PROCEDURE — 7100000010 HC PHASE II RECOVERY - FIRST 15 MIN: Performed by: ORTHOPAEDIC SURGERY

## 2023-04-17 PROCEDURE — 6370000000 HC RX 637 (ALT 250 FOR IP): Performed by: ORTHOPAEDIC SURGERY

## 2023-04-17 PROCEDURE — 2500000003 HC RX 250 WO HCPCS: Performed by: NURSE ANESTHETIST, CERTIFIED REGISTERED

## 2023-04-17 PROCEDURE — 3600000004 HC SURGERY LEVEL 4 BASE: Performed by: ORTHOPAEDIC SURGERY

## 2023-04-17 PROCEDURE — 6360000002 HC RX W HCPCS: Performed by: NURSE ANESTHETIST, CERTIFIED REGISTERED

## 2023-04-17 PROCEDURE — 6360000002 HC RX W HCPCS: Performed by: ANESTHESIOLOGY

## 2023-04-17 PROCEDURE — 2709999900 HC NON-CHARGEABLE SUPPLY: Performed by: ORTHOPAEDIC SURGERY

## 2023-04-17 PROCEDURE — 3700000001 HC ADD 15 MINUTES (ANESTHESIA): Performed by: ORTHOPAEDIC SURGERY

## 2023-04-17 PROCEDURE — 7100000000 HC PACU RECOVERY - FIRST 15 MIN: Performed by: ORTHOPAEDIC SURGERY

## 2023-04-17 PROCEDURE — 7100000011 HC PHASE II RECOVERY - ADDTL 15 MIN: Performed by: ORTHOPAEDIC SURGERY

## 2023-04-17 PROCEDURE — 6360000002 HC RX W HCPCS

## 2023-04-17 PROCEDURE — 2720000010 HC SURG SUPPLY STERILE: Performed by: ORTHOPAEDIC SURGERY

## 2023-04-17 PROCEDURE — C1713 ANCHOR/SCREW BN/BN,TIS/BN: HCPCS | Performed by: ORTHOPAEDIC SURGERY

## 2023-04-17 PROCEDURE — 2580000003 HC RX 258: Performed by: ORTHOPAEDIC SURGERY

## 2023-04-17 DEVICE — 4.5MM STRYKER REELX STT ANCHOR
Type: IMPLANTABLE DEVICE | Status: FUNCTIONAL
Brand: REELX

## 2023-04-17 RX ORDER — HYDROCODONE BITARTRATE AND ACETAMINOPHEN 5; 325 MG/1; MG/1
1 TABLET ORAL ONCE
Status: COMPLETED | OUTPATIENT
Start: 2023-04-17 | End: 2023-04-17

## 2023-04-17 RX ORDER — FENTANYL CITRATE 50 UG/ML
50 INJECTION, SOLUTION INTRAMUSCULAR; INTRAVENOUS EVERY 5 MIN PRN
Status: DISCONTINUED | OUTPATIENT
Start: 2023-04-17 | End: 2023-04-17 | Stop reason: HOSPADM

## 2023-04-17 RX ORDER — FENTANYL CITRATE 50 UG/ML
INJECTION, SOLUTION INTRAMUSCULAR; INTRAVENOUS PRN
Status: DISCONTINUED | OUTPATIENT
Start: 2023-04-17 | End: 2023-04-17 | Stop reason: SDUPTHER

## 2023-04-17 RX ORDER — HYDROCODONE BITARTRATE AND ACETAMINOPHEN 5; 325 MG/1; MG/1
1 TABLET ORAL EVERY 4 HOURS PRN
Qty: 30 TABLET | Refills: 0 | Status: SHIPPED | OUTPATIENT
Start: 2023-04-17 | End: 2023-04-22

## 2023-04-17 RX ORDER — KETAMINE HCL IN NACL, ISO-OSM 100MG/10ML
SYRINGE (ML) INJECTION PRN
Status: DISCONTINUED | OUTPATIENT
Start: 2023-04-17 | End: 2023-04-17 | Stop reason: SDUPTHER

## 2023-04-17 RX ORDER — DROPERIDOL 2.5 MG/ML
0.62 INJECTION, SOLUTION INTRAMUSCULAR; INTRAVENOUS
Status: DISCONTINUED | OUTPATIENT
Start: 2023-04-17 | End: 2023-04-17 | Stop reason: HOSPADM

## 2023-04-17 RX ORDER — SODIUM CHLORIDE 9 MG/ML
25 INJECTION, SOLUTION INTRAVENOUS PRN
Status: DISCONTINUED | OUTPATIENT
Start: 2023-04-17 | End: 2023-04-17 | Stop reason: HOSPADM

## 2023-04-17 RX ORDER — DEXAMETHASONE SODIUM PHOSPHATE 10 MG/ML
INJECTION, EMULSION INTRAMUSCULAR; INTRAVENOUS PRN
Status: DISCONTINUED | OUTPATIENT
Start: 2023-04-17 | End: 2023-04-17 | Stop reason: SDUPTHER

## 2023-04-17 RX ORDER — ONDANSETRON 2 MG/ML
INJECTION INTRAMUSCULAR; INTRAVENOUS PRN
Status: DISCONTINUED | OUTPATIENT
Start: 2023-04-17 | End: 2023-04-17 | Stop reason: SDUPTHER

## 2023-04-17 RX ORDER — MIDAZOLAM HYDROCHLORIDE 1 MG/ML
INJECTION INTRAMUSCULAR; INTRAVENOUS PRN
Status: DISCONTINUED | OUTPATIENT
Start: 2023-04-17 | End: 2023-04-17 | Stop reason: SDUPTHER

## 2023-04-17 RX ORDER — ALBUTEROL SULFATE 90 UG/1
2 AEROSOL, METERED RESPIRATORY (INHALATION) EVERY 6 HOURS PRN
COMMUNITY

## 2023-04-17 RX ORDER — SODIUM CHLORIDE, SODIUM LACTATE, POTASSIUM CHLORIDE, CALCIUM CHLORIDE 600; 310; 30; 20 MG/100ML; MG/100ML; MG/100ML; MG/100ML
INJECTION, SOLUTION INTRAVENOUS CONTINUOUS
Status: DISCONTINUED | OUTPATIENT
Start: 2023-04-17 | End: 2023-04-17 | Stop reason: HOSPADM

## 2023-04-17 RX ORDER — SODIUM CHLORIDE 0.9 % (FLUSH) 0.9 %
5-40 SYRINGE (ML) INJECTION PRN
Status: DISCONTINUED | OUTPATIENT
Start: 2023-04-17 | End: 2023-04-17 | Stop reason: HOSPADM

## 2023-04-17 RX ORDER — EPINEPHRINE 1 MG/ML
INJECTION, SOLUTION, CONCENTRATE INTRAVENOUS PRN
Status: DISCONTINUED | OUTPATIENT
Start: 2023-04-17 | End: 2023-04-17 | Stop reason: ALTCHOICE

## 2023-04-17 RX ORDER — KETOROLAC TROMETHAMINE 30 MG/ML
INJECTION, SOLUTION INTRAMUSCULAR; INTRAVENOUS PRN
Status: DISCONTINUED | OUTPATIENT
Start: 2023-04-17 | End: 2023-04-17 | Stop reason: SDUPTHER

## 2023-04-17 RX ORDER — GLYCOPYRROLATE 0.2 MG/ML
INJECTION INTRAMUSCULAR; INTRAVENOUS PRN
Status: DISCONTINUED | OUTPATIENT
Start: 2023-04-17 | End: 2023-04-17 | Stop reason: SDUPTHER

## 2023-04-17 RX ORDER — DIPHENHYDRAMINE HYDROCHLORIDE 50 MG/ML
12.5 INJECTION INTRAMUSCULAR; INTRAVENOUS
Status: DISCONTINUED | OUTPATIENT
Start: 2023-04-17 | End: 2023-04-17 | Stop reason: HOSPADM

## 2023-04-17 RX ORDER — IPRATROPIUM BROMIDE AND ALBUTEROL SULFATE 2.5; .5 MG/3ML; MG/3ML
1 SOLUTION RESPIRATORY (INHALATION)
Status: DISCONTINUED | OUTPATIENT
Start: 2023-04-17 | End: 2023-04-17 | Stop reason: HOSPADM

## 2023-04-17 RX ORDER — ROCURONIUM BROMIDE 10 MG/ML
INJECTION, SOLUTION INTRAVENOUS PRN
Status: DISCONTINUED | OUTPATIENT
Start: 2023-04-17 | End: 2023-04-17 | Stop reason: SDUPTHER

## 2023-04-17 RX ORDER — LORAZEPAM 2 MG/ML
0.5 INJECTION INTRAMUSCULAR
Status: DISCONTINUED | OUTPATIENT
Start: 2023-04-17 | End: 2023-04-17 | Stop reason: HOSPADM

## 2023-04-17 RX ORDER — ONDANSETRON 2 MG/ML
4 INJECTION INTRAMUSCULAR; INTRAVENOUS
Status: DISCONTINUED | OUTPATIENT
Start: 2023-04-17 | End: 2023-04-17 | Stop reason: HOSPADM

## 2023-04-17 RX ORDER — PROPOFOL 10 MG/ML
INJECTION, EMULSION INTRAVENOUS PRN
Status: DISCONTINUED | OUTPATIENT
Start: 2023-04-17 | End: 2023-04-17 | Stop reason: SDUPTHER

## 2023-04-17 RX ORDER — HYDRALAZINE HYDROCHLORIDE 20 MG/ML
10 INJECTION INTRAMUSCULAR; INTRAVENOUS
Status: DISCONTINUED | OUTPATIENT
Start: 2023-04-17 | End: 2023-04-17 | Stop reason: HOSPADM

## 2023-04-17 RX ORDER — LABETALOL HYDROCHLORIDE 5 MG/ML
10 INJECTION, SOLUTION INTRAVENOUS
Status: DISCONTINUED | OUTPATIENT
Start: 2023-04-17 | End: 2023-04-17 | Stop reason: HOSPADM

## 2023-04-17 RX ORDER — MEPERIDINE HYDROCHLORIDE 25 MG/ML
12.5 INJECTION INTRAMUSCULAR; INTRAVENOUS; SUBCUTANEOUS EVERY 5 MIN PRN
Status: DISCONTINUED | OUTPATIENT
Start: 2023-04-17 | End: 2023-04-17 | Stop reason: HOSPADM

## 2023-04-17 RX ORDER — MORPHINE SULFATE 2 MG/ML
2 INJECTION, SOLUTION INTRAMUSCULAR; INTRAVENOUS EVERY 5 MIN PRN
Status: DISCONTINUED | OUTPATIENT
Start: 2023-04-17 | End: 2023-04-17 | Stop reason: HOSPADM

## 2023-04-17 RX ORDER — SODIUM CHLORIDE 0.9 % (FLUSH) 0.9 %
5-40 SYRINGE (ML) INJECTION EVERY 12 HOURS SCHEDULED
Status: DISCONTINUED | OUTPATIENT
Start: 2023-04-17 | End: 2023-04-17 | Stop reason: HOSPADM

## 2023-04-17 RX ADMIN — ONDANSETRON 4 MG: 2 INJECTION INTRAMUSCULAR; INTRAVENOUS at 15:25

## 2023-04-17 RX ADMIN — DEXAMETHASONE SODIUM PHOSPHATE 10 MG: 10 INJECTION, EMULSION INTRAMUSCULAR; INTRAVENOUS at 13:56

## 2023-04-17 RX ADMIN — GLYCOPYRROLATE 0.2 MG: 0.2 INJECTION, SOLUTION INTRAMUSCULAR; INTRAVENOUS at 15:17

## 2023-04-17 RX ADMIN — FENTANYL CITRATE 50 MCG: 50 INJECTION, SOLUTION INTRAMUSCULAR; INTRAVENOUS at 15:56

## 2023-04-17 RX ADMIN — HYDROMORPHONE HYDROCHLORIDE 0.5 MG: 1 INJECTION, SOLUTION INTRAMUSCULAR; INTRAVENOUS; SUBCUTANEOUS at 16:03

## 2023-04-17 RX ADMIN — Medication 20 MG: at 14:21

## 2023-04-17 RX ADMIN — FENTANYL CITRATE 50 MCG: 50 INJECTION, SOLUTION INTRAMUSCULAR; INTRAVENOUS at 14:05

## 2023-04-17 RX ADMIN — PROPOFOL 150 MG: 10 INJECTION, EMULSION INTRAVENOUS at 13:50

## 2023-04-17 RX ADMIN — FENTANYL CITRATE 100 MCG: 50 INJECTION, SOLUTION INTRAMUSCULAR; INTRAVENOUS at 13:50

## 2023-04-17 RX ADMIN — HYDROMORPHONE HYDROCHLORIDE 0.5 MG: 1 INJECTION, SOLUTION INTRAMUSCULAR; INTRAVENOUS; SUBCUTANEOUS at 16:08

## 2023-04-17 RX ADMIN — FENTANYL CITRATE 50 MCG: 50 INJECTION, SOLUTION INTRAMUSCULAR; INTRAVENOUS at 15:39

## 2023-04-17 RX ADMIN — Medication 100 MG: at 13:50

## 2023-04-17 RX ADMIN — SUGAMMADEX 200 MG: 100 INJECTION, SOLUTION INTRAVENOUS at 15:32

## 2023-04-17 RX ADMIN — KETOROLAC TROMETHAMINE 30 MG: 30 INJECTION, SOLUTION INTRAMUSCULAR at 15:26

## 2023-04-17 RX ADMIN — Medication 2000 MG: at 14:00

## 2023-04-17 RX ADMIN — HYDROCODONE BITARTRATE AND ACETAMINOPHEN 1 TABLET: 5; 325 TABLET ORAL at 17:06

## 2023-04-17 RX ADMIN — FENTANYL CITRATE 50 MCG: 50 INJECTION, SOLUTION INTRAMUSCULAR; INTRAVENOUS at 15:51

## 2023-04-17 RX ADMIN — FENTANYL CITRATE 50 MCG: 50 INJECTION, SOLUTION INTRAMUSCULAR; INTRAVENOUS at 16:13

## 2023-04-17 RX ADMIN — MIDAZOLAM 2 MG: 1 INJECTION INTRAMUSCULAR; INTRAVENOUS at 13:45

## 2023-04-17 RX ADMIN — PHENYLEPHRINE HYDROCHLORIDE 100 MCG: 10 INJECTION INTRAVENOUS at 15:21

## 2023-04-17 RX ADMIN — ROCURONIUM BROMIDE 50 MG: 10 INJECTION INTRAVENOUS at 13:50

## 2023-04-17 RX ADMIN — SODIUM CHLORIDE, POTASSIUM CHLORIDE, SODIUM LACTATE AND CALCIUM CHLORIDE: 600; 310; 30; 20 INJECTION, SOLUTION INTRAVENOUS at 12:23

## 2023-04-17 ASSESSMENT — PAIN SCALES - GENERAL
PAINLEVEL_OUTOF10: 8
PAINLEVEL_OUTOF10: 7
PAINLEVEL_OUTOF10: 8
PAINLEVEL_OUTOF10: 8
PAINLEVEL_OUTOF10: 7
PAINLEVEL_OUTOF10: 8

## 2023-04-17 ASSESSMENT — PAIN DESCRIPTION - DESCRIPTORS
DESCRIPTORS: ACHING;STABBING
DESCRIPTORS: ACHING
DESCRIPTORS: ACHING

## 2023-04-17 ASSESSMENT — PAIN - FUNCTIONAL ASSESSMENT
PAIN_FUNCTIONAL_ASSESSMENT: 0-10
PAIN_FUNCTIONAL_ASSESSMENT: 0-10

## 2023-04-17 ASSESSMENT — PAIN DESCRIPTION - FREQUENCY: FREQUENCY: CONTINUOUS

## 2023-04-17 ASSESSMENT — PAIN DESCRIPTION - ORIENTATION
ORIENTATION: LEFT
ORIENTATION: LEFT

## 2023-04-17 ASSESSMENT — PAIN DESCRIPTION - LOCATION
LOCATION: SHOULDER
LOCATION: SHOULDER

## 2023-04-17 ASSESSMENT — PAIN DESCRIPTION - PAIN TYPE: TYPE: SURGICAL PAIN

## 2023-04-17 ASSESSMENT — PAIN DESCRIPTION - ONSET: ONSET: ON-GOING

## 2023-04-17 NOTE — ANESTHESIA PRE PROCEDURE
WBC 9.3 03/24/2023 02:35 PM    RBC 4.38 03/24/2023 02:35 PM    RBC 3.98 03/14/2012 10:54 PM    HGB 13.8 03/24/2023 02:35 PM    HCT 42.2 03/24/2023 02:35 PM    MCV 96.3 03/24/2023 02:35 PM    RDW 12.7 03/14/2012 10:54 PM     03/24/2023 02:35 PM       CMP:   Lab Results   Component Value Date/Time     03/24/2023 02:35 PM    K 4.5 03/24/2023 02:35 PM     03/24/2023 02:35 PM    CO2 28 03/24/2023 02:35 PM    BUN 23 03/24/2023 02:35 PM    CREATININE 0.6 03/24/2023 02:35 PM    LABGLOM >60 03/24/2023 02:35 PM    GLUCOSE 85 03/24/2023 02:35 PM    PROT 6.5 09/26/2022 10:11 AM    CALCIUM 10.2 03/24/2023 02:35 PM    BILITOT 0.2 09/26/2022 10:11 AM    ALKPHOS 58 09/26/2022 10:11 AM    AST 14 09/26/2022 10:11 AM    ALT 16 09/26/2022 10:11 AM       POC Tests: No results for input(s): POCGLU, POCNA, POCK, POCCL, POCBUN, POCHEMO, POCHCT in the last 72 hours. Coags: No results found for: PROTIME, INR, APTT    HCG (If Applicable): No results found for: PREGTESTUR, PREGSERUM, HCG, HCGQUANT     ABGs: No results found for: PHART, PO2ART, TXM1TQZ, GEX4KHB, BEART, D3IPZMGK     Type & Screen (If Applicable):  No results found for: LABABO, LABRH    Drug/Infectious Status (If Applicable):  No results found for: HIV, HEPCAB    COVID-19 Screening (If Applicable):   Lab Results   Component Value Date/Time    COVID19 NOT DETECTED 10/24/2022 04:15 AM           Anesthesia Evaluation    Airway: Mallampati: II          Dental:          Pulmonary:   (+) asthma:                            Cardiovascular:          ECG reviewed                        Neuro/Psych:   (+) headaches:,             GI/Hepatic/Renal:   (+) PUD,           Endo/Other:                     Abdominal:             Vascular: Other Findings:           Anesthesia Plan      general     ASA 2       Induction: intravenous. Anesthetic plan and risks discussed with patient. Plan discussed with CRNA.                     Kaden Child MD   4/17/2023

## 2023-04-17 NOTE — H&P
Update History & Physical    The patient's History and Physical of March 31, 2023 was reviewed with the patient and I examined the patient. There was no change. The surgical site was confirmed by the patient and me. Plan: The risks, benefits, expected outcome, and alternative to the recommended procedure have been discussed with the patient. Patient understands and wants to proceed with the procedure.      Electronically signed by Eri Cedeno PA-C on 4/17/2023 at 1:15 PM

## 2023-04-17 NOTE — BRIEF OP NOTE
Brief Postoperative Note      Patient: Janneth Cardenas  YOB: 1980  MRN: [id number]    Date of Procedure: 4/17/2023    Pre-Op Diagnosis Codes:  Left shoulder SLAP tear    Post-Op Diagnosis: Same       Procedure(s):  LEFT SHOULDER ARTHROSCOPY, BICEP TENODESIS, DEBRIDEMENT, DISTAL CLAVICLE EXCISION with extensive debridement of labrum, cuff and subacromial bursa    Surgeon(s):  Reema Singh DO    Assistant:  Physician Assistant: Cosme Grossman PA-C    Anesthesia: General    Estimated Blood Loss (mL): Minimal    Complications: None    Specimens:   * No specimens in log *    Implants:  Implant Name Type Inv. Item Serial No.  Lot No. LRB No. Used Action   ANCHOR SUT DIA4. 5MM KNOTLESS PEEK REELX STT - [id number]  ANCHOR SUT DIA4. 5MM KNOTLESS PEEK REELX STT  [employer] [id number] Left 1 Implanted         Drains: * No LDAs found *    Findings: Postop diagnosis confirmed      Electronically signed by Radha Avalos PA-C on 4/17/2023 at 3:39 PM

## 2023-04-17 NOTE — PROGRESS NOTES

## 2023-04-17 NOTE — PROGRESS NOTES
1541 pt arrived to PACU, awake and alert. C/o pain 8/10 in shoulder, CRNA states just medicated for pain. VSS  1551 c/o pain 8/10, medicated with 50 mcg fentanyl  1556 no change in pain status, medicated with 50 mcg fentanyl  1603 no change in pain status, medicated with 0.5 mg dilaudid  1608 no change in pain status, medicated with 0.5 mg dilaudid  1613 no change in pain status, medicated with 50 mcg fentanyl  1618 pt states pain 8/10 and tolerable, denies need for more pain medication at this time. VSS  1623 pt states pain 8/10 and tolerable, denies need for more pain medication at this time.   1633 meets criteria for discharge from PACU, transported to \[facility]\"" in stable condition

## 2023-04-17 NOTE — ANESTHESIA POSTPROCEDURE EVALUATION
Department of Anesthesiology  Postprocedure Note    Patient: Cathleen Robin  MRN: [id number]  YOB: 1980  Date of evaluation: 4/17/2023      Procedure Summary     Date: 04/17/23 Room / Location: [address] CARMEN Garibay    Anesthesia Start: 5965 Anesthesia Stop: 1545    Procedure: LEFT SHOULDER ARTHROSCOPY, BICEP TENODESIS, DEBRIDEMENT, DISTAL CLAVICLE EXCISION (Left: Shoulder) Diagnosis:       Tear of left glenoid labrum, initial encounter      (Tear of left glenoid labrum, initial encounter [B47.079S])    Surgeons: Negrito Rudd DO Responsible Provider: Andrea Henderson MD    Anesthesia Type: general ASA Status: 2          Anesthesia Type: No value filed.     Kyara Phase I: Kyara Score: 10    Kyara Phase II:        Anesthesia Post Evaluation    Patient location during evaluation: PACU  Patient participation: complete - patient participated  Level of consciousness: awake and alert  Pain score: 0  Airway patency: patent  Nausea & Vomiting: no nausea and no vomiting  Complications: no  Cardiovascular status: hemodynamically stable and blood pressure returned to baseline  Respiratory status: spontaneous ventilation, room air and acceptable  Hydration status: stable

## 2023-04-17 NOTE — DISCHARGE INSTRUCTIONS
Orthopedic Discharge Instructions:  Weight bearing status: No lifting, pushing or pulling   for the left arm. Keep dressing clean and dry. Ok to remove sling at least 3x/day for wrist and hand exercises. Ok for gentle pendulum shoulder exercise only. No active elbow flexion and supination. No forced elbow extension. Nothing heavier than coffee cup. Starting 3 days after surgery, Ok to remove dressing and replace with Band-aids daily until wound is dry. Then leave open to air. Ice (20 minutes on and off 1 hour) and elevate as needed to reduce swelling and throbbing pain. If Dressing allowed to be removed, Starting 3 days after surgery, if wound is no longer leaking, Ok to shower but no soaks or baths. Advance diet as tolerated: begin with clear liquids. Drink plenty of fluids. Call the office or come to Emergency Room if signs of infection appear (hot, swollen, red, draining pus, fever)  Take medications as prescribed. Wean off narcotics (percocet/norco) as soon as possible. Do not take tylenol if still taking narcotics. Follow up with Dr. Danette Culp in his office in 10-14 days after surgery. Call [phone] ext [phone] to schedule/confirm.

## 2023-04-18 NOTE — OP NOTE
[address] [place] [address]                                OPERATIVE REPORT    PATIENT NAME: Charlotte Montiel                     :        1980  MED REC NO:   [id number]                           ROOM:  ACCOUNT NO:   [de-identified]                           ADMIT DATE: 2023  PROVIDER:     Alexandria Martinez D.O.    DATE OF PROCEDURE:  2023    PREOPERATIVE DIAGNOSES:  Left shoulder SLAP tear and AC joint pain. POSTOPERATIVE DIAGNOSES:  Left shoulder SLAP tear, AC joint arthritis,  approximately 20% articular-sided supraspinatus tearing. OPERATIONS PERFORMED:  Left shoulder arthroscopy with biceps tenodesis,  extensive debridement of labrum, rotator cuff and subacromial bursa, and  distal clavicle excision. SURGEON:  Alexandria Martinez D.O.    ASSISTANT:  Cosme Grossman PA-C, assisted positioning, retraction,  closure and dressing application. TYPE OF ANESTHESIA:  General.    BLOOD LOSS:  Minimal.    IMPLANTS:  Nisreen 4.5 ReelX anchor. INDICATIONS FOR OPERATION:  The patient is a 45-year-old female who was  referred to me with left shoulder pain. She has failed conservative  treatment. Although imaging did not show significant AC joint  arthrosis, she had multiple injections of the [facility] joint that were  significantly helpful. I recommended surgical intervention. The risks,  benefits and alternatives were reviewed. The patient elected to  proceed. OPERATIVE SUMMARY:  The patient was met in the preop holding area and  the correct extremity was identified and marked. Informed consent was  obtained. Patient was escorted to the operative suite. General  anesthesia was administered. She was prepped and draped in standard  surgical fashion. Time-out was taken. Correct patient, procedure and  operative site were agreed upon by all present.   I did perform an  examination under anesthesia prior to prepping

## 2023-05-02 ENCOUNTER — HOSPITAL ENCOUNTER (OUTPATIENT)
Dept: PHYSICAL THERAPY | Age: 43
Setting detail: THERAPIES SERIES
Discharge: HOME | End: 2023-05-02
Payer: COMMERCIAL

## 2023-05-02 PROCEDURE — 97162 PT EVAL MOD COMPLEX 30 MIN: CPT

## 2023-05-02 NOTE — PROGRESS NOTES
** PLEASE SIGN, DATE AND TIME CERTIFICATION BELOW AND RETURN TO [facility] OUTPATIENT REHABILITATION (FAX #: [phone]). ATTEST/CO-SIGN IF ACCESSING VIA [facility]. THANK YOU.**    I certify that I have examined the patient below and determined that Physical Medicine and Rehabilitation service is necessary and that I approve the established plan of care for up to 90 days or as specifically noted. Attestation, signature or co-signature of physician indicates approval of certification requirements.    ________________________ ____________ __________  Physician Signature   Date   Time  [address] [facility]  PHYSICAL THERAPY  [x] EVALUATION  [] DAILY NOTE (LAND) [] DAILY NOTE (AQUATIC ) [] PROGRESS NOTE [] DISCHARGE NOTE    [x] [facility]   [] [place] [address]    [] [facility]   [] [place]    Date: 2023  Patient Name:  Valarie Peterson  : 1980  MRN: [id number]  CSN: [id number]    Referring Practitioner Kandis Lemus *   Diagnosis Radiculopathy, lumbar region [M54.16]    Treatment Diagnosis Lumbago, left leg pain   Date of Evaluation 23    Additional Pertinent History COPD, Anxiety Disorder, Arthritis, Osteoporosis      Functional Outcome Measure Used Oswestry   Functional Outcome Score 30, 66.67% (23)       Insurance: Primary: Payor: [address] [place] [address]  [address] /  /  / ,   Secondary:    Authorization Information: INSURANCE THERAPY BENEFIT: No pre-certification is needed. PT, OT and ST are allowed 30 visits each per calendar year. AQUATIC THERAPY COVERED:  Yes   MODALITIES COVERED:  Yes--Iontophoresis is not covered. Hot/Cold Packs are not covered. Visit # 1, 1/10 for progress note   Visits Allowed:    Recertification Date:    Physician Follow-Up: 5/15/23   Physician Orders: Kyle Deleon and treat, massage, dry needling   History of Present Illness: Patient has had back issues her whole life.   She broke back

## 2023-05-04 ENCOUNTER — HOSPITAL ENCOUNTER (OUTPATIENT)
Dept: OCCUPATIONAL THERAPY | Age: 43
Setting detail: THERAPIES SERIES
Discharge: HOME | End: 2023-05-04
Payer: COMMERCIAL

## 2023-05-04 PROCEDURE — 97165 OT EVAL LOW COMPLEX 30 MIN: CPT

## 2023-05-04 PROCEDURE — 97110 THERAPEUTIC EXERCISES: CPT

## 2023-05-04 NOTE — PROGRESS NOTES
** PLEASE SIGN, DATE AND TIME CERTIFICATION BELOW AND RETURN TO [facility] OUTPATIENT REHABILITATION (FAX #: [phone]). ATTEST/CO-SIGN IF ACCESSING VIA [facility]. THANK YOU.**    I certify that I have examined the patient below and determined that Physical Medicine and Rehabilitation service is necessary and that I approve the established plan of care for up to 90 days or as specifically noted. Attestation, signature or co-signature of physician indicates approval of certification requirements.    ________________________ ____________ __________  Physician Signature   Date   Time   [address] THERAPY  [x] EVALUATION  [] DAILY NOTE (LAND) [] DAILY NOTE (AQUATIC ) [] PROGRESS NOTE [] DISCHARGE NOTE    [x] [address] [facility]   [] [place] [address]    [] [facility]   [] [facility]    Date: 2023  Patient Name:  Thurman Goodell  : 1980  MRN: [id number]  CSN: [id number]    Referring Practitioner Kyle Dickson *   Diagnosis Primary osteoarthritis, left shoulder [M19.012]  Superior glenoid labrum lesion of left shoulder, initial encounter [X33.292Q]  Encounter for other orthopedic aftercare [Z47.89]    Treatment Diagnosis Impaired left shoulder ROM, Impaired left shoulder strength   Date of Evaluation 23      Functional Outcome Measure Used UEFS   Functional Outcome Score 10/80 (23)       Insurance: Primary: Payor: [address] [place] [address] [address] /  /  / ,   Secondary:    Authorization Information: OT allowed 30 visits per calendar year, aquatics covered, ionto is not covered. Hot/cold packs are not covered   Visit # 1, 1/10 for progress note   Visits Allowed: 24 per patient. She states she used 6 from therapy prior to surgery. Recertification Date: [id number]   Physician Follow-Up: 2023   Physician Orders: Script dated 23 for \"PT/OT shoulder eval and treat 2-3x week x 4-6 weeks.   Massive RCR

## 2023-05-09 ENCOUNTER — HOSPITAL ENCOUNTER (OUTPATIENT)
Dept: OCCUPATIONAL THERAPY | Age: 43
Setting detail: THERAPIES SERIES
Discharge: HOME | End: 2023-05-09
Payer: COMMERCIAL

## 2023-05-09 ENCOUNTER — HOSPITAL ENCOUNTER (OUTPATIENT)
Dept: PHYSICAL THERAPY | Age: 43
Setting detail: THERAPIES SERIES
Discharge: HOME | End: 2023-05-09
Payer: COMMERCIAL

## 2023-05-09 PROCEDURE — 97110 THERAPEUTIC EXERCISES: CPT

## 2023-05-09 PROCEDURE — 97140 MANUAL THERAPY 1/> REGIONS: CPT

## 2023-05-09 NOTE — PROGRESS NOTES
bathing. Long Term Goals:  Time Frame: 12 weeks  1. Patient will improve UEFS to at least 50.  2.  Patient will demonstrate ability to reach overhead for an object with affected extremity without increased pain. 3.  Patient will report pain in left shoulder no greater than 2/10 at rest for increased ease with sleep. Patient Education:   []  HEP/Education Completed: Plan of Care, Goals, precautions  [address] Access Code for HEP: Access Code: [email or web address]  - Circular Shoulder Pendulum with Table Support  - 3 x daily - 7 x weekly - 1 sets - 10 reps  - Flexion-Extension Shoulder Pendulum with Table Support  - 3 x daily - 7 x weekly - 1 sets - 10 reps  - Horizontal Shoulder Pendulum with Table Support  - 3 x daily - 7 x weekly - 1 sets - 10 reps  - Seated Scapular Retraction  - 3 x daily - 7 x weekly - 1 sets - 10 reps  - Seated Elbow Flexion AAROM  - 3 x daily - 7 x weekly - 1 sets - 10 reps  [x]  No new Education completed  []  Reviewed Prior HEP      [x]  Patient verbalized and/or demonstrated understanding of education provided. []  Patient unable to verbalize and/or demonstrate understanding of education provided. Will continue education. [x]  Barriers to learning: none    PLAN:  Treatment Recommendations: Strengthening, Range of Motion, Manual Therapy - Soft Tissue Mobilization, Home Exercise Program, Patient Education, and Modalities    []  Plan of care initiated. Plan to see patient 2 times per week for 12 weeks to address the treatment planned outlined above.   [x]  Continue with current plan of care  []  Modify plan of care as follows:    []  Hold pending physician visit  []  Discharge    Time In 1015   Time Out 1045   Timed Code Minutes: 30 min   Total Treatment Time: 30 min       Electronically Signed by:Analisa Barragan OTR/L, CHT #[id number]

## 2023-05-09 NOTE — PROGRESS NOTES
weeks  Improve Oswestry to 26 or less to assist with cleaning house. Improve posture needing no cues for correction to assist preventing injury. Decrease pain in back to 4/10 to assist with standing. Improve core strengthening with no cues needed for abdominal bracing to assist with carrying groceries. Increase lumbar ROM to [facility] [place] to assist with putting on shoes and socks. Long Term Goals:  Time Frame: 12 weeks  Independent with HEP and with progression to assist with decreasing pain. Patient Education:   [x]  HEP/Education Completed: monitor response to treatment  [facility] Access Code:  []  No new Education completed  []  Reviewed Prior HEP      []  Patient verbalized and/or demonstrated understanding of education provided. []  Patient unable to verbalize and/or demonstrate understanding of education provided. Will continue education. [x]  Barriers to learning: none per patient    PLAN:  Treatment Recommendations: Strengthening, Range of Motion, Functional Mobility Training, Transfer Training, Endurance Training, Gait Training, Manual Therapy - Soft Tissue Mobilization, Pain Management, Home Exercise Program, Patient Education, Safety Education and Training, Positioning, Integrative Dry Needling, Aquatics, and Modalities    []  Plan of care initiated. Plan to see patient 2 times per week for up to 12 weeks to address the treatment planned outlined above.   [x]  Continue with current plan of care  []  Modify plan of care as follows:    []  Hold pending physician visit  []  Discharge    Time In 1115   Time Out 1143   Timed Code Minutes: 28   Total Treatment Time: 28       Electronically Signed by: Alvan Schaumann, PTA

## 2023-05-11 ENCOUNTER — HOSPITAL ENCOUNTER (OUTPATIENT)
Dept: PHYSICAL THERAPY | Age: 43
Setting detail: THERAPIES SERIES
Discharge: HOME | End: 2023-05-11
Payer: COMMERCIAL

## 2023-05-11 ENCOUNTER — HOSPITAL ENCOUNTER (OUTPATIENT)
Dept: OCCUPATIONAL THERAPY | Age: 43
Setting detail: THERAPIES SERIES
Discharge: HOME | End: 2023-05-11
Payer: COMMERCIAL

## 2023-05-11 ENCOUNTER — APPOINTMENT (OUTPATIENT)
Dept: OCCUPATIONAL THERAPY | Age: 43
End: 2023-05-11
Payer: COMMERCIAL

## 2023-05-11 PROCEDURE — 97140 MANUAL THERAPY 1/> REGIONS: CPT

## 2023-05-11 PROCEDURE — 97110 THERAPEUTIC EXERCISES: CPT

## 2023-05-11 NOTE — PROGRESS NOTES
Goals: Time Frame: 4 weeks  Improve Oswestry to 26 or less to assist with cleaning house. Improve posture needing no cues for correction to assist preventing injury. Decrease pain in back to 4/10 to assist with standing. Improve core strengthening with no cues needed for abdominal bracing to assist with carrying groceries. Increase lumbar ROM to [facility] [place] to assist with putting on shoes and socks. Long Term Goals:  Time Frame: 12 weeks  Independent with HEP and with progression to assist with decreasing pain. Patient Education:   [x]  HEP/Education Completed: dry needling safety questions reviewed, educated on monitoring relief after dry needling  [employer] Access Code:  []  No new Education completed  []  Reviewed Prior HEP      []  Patient verbalized and/or demonstrated understanding of education provided. []  Patient unable to verbalize and/or demonstrate understanding of education provided. Will continue education. [x]  Barriers to learning: none per patient    PLAN:  Treatment Recommendations: Strengthening, Range of Motion, Functional Mobility Training, Transfer Training, Endurance Training, Gait Training, Manual Therapy - Soft Tissue Mobilization, Pain Management, Home Exercise Program, Patient Education, Safety Education and Training, Positioning, Integrative Dry Needling, Aquatics, and Modalities    []  Plan of care initiated. Plan to see patient 2 times per week for up to 12 weeks to address the treatment planned outlined above.   [x]  Continue with current plan of care  []  Modify plan of care as follows:    []  Hold pending physician visit  []  Discharge    Time In 810   Time Out 845   Timed Code Minutes: 25   Total Treatment Time: 35       Electronically Signed by: Piyush Gordon PT

## 2023-05-11 NOTE — PROGRESS NOTES
[address] THERAPY  [] EVALUATION  [x] DAILY NOTE (LAND) [] DAILY NOTE (AQUATIC ) [] PROGRESS NOTE [] DISCHARGE NOTE    [x] OUTPATIENT REHABILITATION [facility]   [] [place] [address]    [] [facility]   [] [place]    Date: 2023  Patient Name:  Thomas Clemons  : 1980  MRN: [id number]  CSN: [id number]    Referring Practitioner [facility] *   Diagnosis Primary osteoarthritis, left shoulder [M19.012]  Superior glenoid labrum lesion of left shoulder, initial encounter [C70.872T]  Encounter for other orthopedic aftercare [Z47.89]    Treatment Diagnosis Impaired left shoulder ROM, Impaired left shoulder strength   Date of Evaluation 23      Functional Outcome Measure Used UEFS   Functional Outcome Score 10/80 (23)       Insurance: Primary: Payor: [address] [place] [address] [address] /  /  / ,   Secondary:    Authorization Information: OT allowed 30 visits per calendar year, aquatics covered, ionto is not covered. Hot/cold packs are not covered   Visit # 3, 3/10 for progress note   Visits Allowed: 24 per patient. She states she used 6 from therapy prior to surgery. Recertification Date: FX   Physician Follow-Up: 2023   Physician Orders: Script dated 23 for \"PT/OT shoulder eval and treat 2-3x week x 4-6 weeks. Massive RCR protocol. Please teach HEP, RC strengthening, periscapular strengthening/training. Shoulder ROM with goal full pain free active ROM. Cervical stretching is necessary. \"   Pertinent History: Patient reports she had surgery on her left shoulder on . Per op report, patient had \"left shoulder arthroscopy with biceps tenodesis, extensive debridement of labrum, rotator cuff, and subacromial bursa, and distal clavicle excision. \"  Past medical history is listed as anxiety, COPD, arthritis, fibromyalgia, spinal osteoporesis. Patient is currently in PT for back issues.

## 2023-05-16 ENCOUNTER — HOSPITAL ENCOUNTER (OUTPATIENT)
Dept: PHYSICAL THERAPY | Age: 43
Setting detail: THERAPIES SERIES
Discharge: HOME | End: 2023-05-16
Payer: COMMERCIAL

## 2023-05-16 ENCOUNTER — HOSPITAL ENCOUNTER (OUTPATIENT)
Dept: OCCUPATIONAL THERAPY | Age: 43
Setting detail: THERAPIES SERIES
Discharge: HOME | End: 2023-05-16
Payer: COMMERCIAL

## 2023-05-16 PROCEDURE — 97124 MASSAGE THERAPY: CPT

## 2023-05-16 PROCEDURE — 97110 THERAPEUTIC EXERCISES: CPT

## 2023-05-16 PROCEDURE — 97140 MANUAL THERAPY 1/> REGIONS: CPT

## 2023-05-16 NOTE — PROGRESS NOTES
[address] [facility]  PHYSICAL THERAPY  [] EVALUATION  [x] DAILY NOTE (LAND) [] DAILY NOTE (AQUATIC ) [] PROGRESS NOTE [] DISCHARGE NOTE    [x] [facility] [place]   [] [place] [address]    [] [facility]   [] [place]     Date: 2023  Patient Name:  Claire Lane  : 1980  MRN: [id number]  CSN: [id number]    Referring Practitioner Nona Celestin, APR*   Diagnosis Radiculopathy, lumbar region [M54.16]; Thoracic pain   Treatment Diagnosis Lumbago, left leg pain   Date of Evaluation 23    Additional Pertinent History COPD, Anxiety Disorder, Arthritis, Osteoporosis      Functional Outcome Measure Used Oswestry   Functional Outcome Score 30, 66.67% (23)       Insurance: Primary: Payor: [address] [place] [address]  [address] /  /  / ,   Secondary:    Authorization Information: INSURANCE THERAPY BENEFIT: No pre-certification is needed. PT, OT and ST are allowed 30 visits each per calendar year. AQUATIC THERAPY COVERED:  Yes   MODALITIES COVERED:  Yes--Iontophoresis is not covered. Hot/Cold Packs are not covered. Visit # 4, 4/10 for progress note   Visits Allowed: 30 (used 6 previously in the year)   Recertification Date: 01   Physician Follow-Up: 5/15/23   Physician Orders: Liz Alvares and treat, massage, dry needling   History of Present Illness: Patient has had back issues her whole life. She broke back when she was 7years old. As she's gotten older, her pain has gotten worse. She also has pain going down left leg, foot caught and she fell in 2021 injuring left shoulder. Had surgery on left shoulder 23. Had lumbar injection in April. She normally gets relief for about 2 months. Last MRI was last year. Patient is not interested in ablation at this time, but is more interested in massage, dry needling, and aquatics. SUBJECTIVE: Patient had temporary thoracic nerve block yesterday.   Reports she may be getting an

## 2023-05-16 NOTE — PROGRESS NOTES
[address] THERAPY  [] EVALUATION  [x] DAILY NOTE (LAND) [] DAILY NOTE (AQUATIC ) [] PROGRESS NOTE [] DISCHARGE NOTE    [x] OUTPATIENT REHABILITATION [facility]   [] [place] [address]    [] [facility]   [] Zhao Smith    Date: 2023  Patient Name:  Gregory Wilson  : 1980  MRN: [id number]  CSN: [id number]    Referring Practitioner Arcelia Han PA-C   Diagnosis Primary osteoarthritis, left shoulder [M19.012]  Superior glenoid labrum lesion of left shoulder, initial encounter [P82.538T]  Encounter for other orthopedic aftercare [Z47.89]    Treatment Diagnosis Impaired left shoulder ROM, Impaired left shoulder strength   Date of Evaluation 23      Functional Outcome Measure Used UEFS   Functional Outcome Score 10/80 (23)       Insurance: Primary: Payor: [address] [place] [address] [address] /  /  / ,   Secondary:    Authorization Information: OT allowed 30 visits per calendar year, aquatics covered, ionto is not covered. Hot/cold packs are not covered   Visit # 4, 4/10 for progress note   Visits Allowed: 24 per patient. She states she used 6 from therapy prior to surgery. Recertification Date:    Physician Follow-Up: 2023   Physician Orders: Script dated 23 for \"PT/OT shoulder eval and treat 2-3x week x 4-6 weeks. Massive RCR protocol. Please teach HEP, RC strengthening, periscapular strengthening/training. Shoulder ROM with goal full pain free active ROM. Cervical stretching is necessary. \"   Pertinent History: Patient reports she had surgery on her left shoulder on . Per op report, patient had \"left shoulder arthroscopy with biceps tenodesis, extensive debridement of labrum, rotator cuff, and subacromial bursa, and distal clavicle excision. \"  Past medical history is listed as anxiety, COPD, arthritis, fibromyalgia, spinal osteoporesis. Patient is currently in PT for back issues.

## 2023-05-18 ENCOUNTER — HOSPITAL ENCOUNTER (OUTPATIENT)
Dept: OCCUPATIONAL THERAPY | Age: 43
Setting detail: THERAPIES SERIES
Discharge: HOME | End: 2023-05-18
Payer: COMMERCIAL

## 2023-05-18 ENCOUNTER — APPOINTMENT (OUTPATIENT)
Dept: OCCUPATIONAL THERAPY | Age: 43
End: 2023-05-18
Payer: COMMERCIAL

## 2023-05-18 ENCOUNTER — APPOINTMENT (OUTPATIENT)
Dept: PHYSICAL THERAPY | Age: 43
End: 2023-05-18
Payer: COMMERCIAL

## 2023-05-18 ENCOUNTER — HOSPITAL ENCOUNTER (OUTPATIENT)
Dept: PHYSICAL THERAPY | Age: 43
Setting detail: THERAPIES SERIES
Discharge: HOME | End: 2023-05-18
Payer: COMMERCIAL

## 2023-05-18 PROCEDURE — 97110 THERAPEUTIC EXERCISES: CPT

## 2023-05-18 PROCEDURE — 97140 MANUAL THERAPY 1/> REGIONS: CPT

## 2023-05-18 NOTE — PROGRESS NOTES
[address] [facility]  PHYSICAL THERAPY  [] EVALUATION  [x] DAILY NOTE (LAND) [] DAILY NOTE (AQUATIC ) [] PROGRESS NOTE [] DISCHARGE NOTE    [x] OUTPATIENT REHABILITATION CENTER [facility]   [] [place] [address]    [] [facility]   [] Kristina Love    Date: 2023  Patient Name:  Prince Desai  : 1980  MRN: [id number]  CSN: [id number]    Referring Practitioner Maryam Reyna, APR*   Diagnosis Radiculopathy, lumbar region [M54.16]; Thoracic pain   Treatment Diagnosis Lumbago, left leg pain   Date of Evaluation 23    Additional Pertinent History COPD, Anxiety Disorder, Arthritis, Osteoporosis      Functional Outcome Measure Used Oswestry   Functional Outcome Score 30, 66.67% (23)       Insurance: Primary: Payor: [address] [place] [address]  [address] /  /  / ,   Secondary:    Authorization Information: INSURANCE THERAPY BENEFIT: No pre-certification is needed. PT, OT and ST are allowed 30 visits each per calendar year. AQUATIC THERAPY COVERED:  Yes   MODALITIES COVERED:  Yes--Iontophoresis is not covered. Hot/Cold Packs are not covered. Visit # 5, 5/10 for progress note   Visits Allowed: 30 (used 6 previously in the year)   Recertification Date:    Physician Follow-Up: 23 - another diagnostic nerve block, considering ablation in .    Physician Orders: Eval and treat, massage, dry needling   History of Present Illness: Patient has had back issues her whole life. She broke back when she was 7years old. As she's gotten older, her pain has gotten worse. She also has pain going down left leg, foot caught and she fell in 2021 injuring left shoulder. Had surgery on left shoulder 23. Had lumbar injection in April. She normally gets relief for about 2 months. Last MRI was last year. Patient is not interested in ablation at this time, but is more interested in massage, dry needling, and aquatics.        SUBJECTIVE: Patient had

## 2023-05-18 NOTE — PROGRESS NOTES
[address] THERAPY  [] EVALUATION  [x] DAILY NOTE (LAND) [] DAILY NOTE (AQUATIC ) [] PROGRESS NOTE [] DISCHARGE NOTE    [x] OUTPATIENT REHABILITATION [facility]   [] [place] [address]    [] [facility]   [] Shayan Batch    Date: 2023  Patient Name:  Kimani James  : 1980  MRN: [id number]  CSN: [id number]    Referring Practitioner Brennon Grant PA-C   Diagnosis Primary osteoarthritis, left shoulder [M19.012]  Superior glenoid labrum lesion of left shoulder, initial encounter [W64.421N]  Encounter for other orthopedic aftercare [Z47.89]    Treatment Diagnosis Impaired left shoulder ROM, Impaired left shoulder strength   Date of Evaluation 23      Functional Outcome Measure Used UEFS   Functional Outcome Score 10/80 (23)       Insurance: Primary: Payor: [address] [place] [address]  [address] /  /  / ,   Secondary:    Authorization Information: OT allowed 30 visits per calendar year, aquatics covered, ionto is not covered. Hot/cold packs are not covered   Visit # 5, 5/10 for progress note   Visits Allowed: 24 per patient. She states she used 6 from therapy prior to surgery. Recertification Date:    Physician Follow-Up: 2023   Physician Orders: Script dated 23 for \"PT/OT shoulder eval and treat 2-3x week x 4-6 weeks. Massive RCR protocol. Please teach HEP, RC strengthening, periscapular strengthening/training. Shoulder ROM with goal full pain free active ROM. Cervical stretching is necessary. \"   Pertinent History: Patient reports she had surgery on her left shoulder on . Per op report, patient had \"left shoulder arthroscopy with biceps tenodesis, extensive debridement of labrum, rotator cuff, and subacromial bursa, and distal clavicle excision. \"  Past medical history is listed as anxiety, COPD, arthritis, fibromyalgia, spinal osteoporesis. Patient is currently in PT for back issues.

## 2023-05-23 ENCOUNTER — APPOINTMENT (OUTPATIENT)
Dept: PHYSICAL THERAPY | Age: 43
End: 2023-05-23
Payer: COMMERCIAL

## 2023-05-23 ENCOUNTER — HOSPITAL ENCOUNTER (OUTPATIENT)
Dept: OCCUPATIONAL THERAPY | Age: 43
Setting detail: THERAPIES SERIES
Discharge: HOME | End: 2023-05-23
Payer: COMMERCIAL

## 2023-05-23 PROCEDURE — 97140 MANUAL THERAPY 1/> REGIONS: CPT

## 2023-05-23 PROCEDURE — 97110 THERAPEUTIC EXERCISES: CPT

## 2023-05-23 NOTE — PROGRESS NOTES
[address] THERAPY  [] EVALUATION  [x] DAILY NOTE (LAND) [] DAILY NOTE (AQUATIC ) [] PROGRESS NOTE [] DISCHARGE NOTE    [x] OUTPATIENT REHABILITATION [facility]   [] [place] [address]    [] [facility]   [] Ricki Real    Date: 2023  Patient Name:  Ngozi Sheppard  : 1980  MRN: [id number]  CSN: [id number]    Referring Practitioner Tania Peralta PA-C   Diagnosis Primary osteoarthritis, left shoulder [M19.012]  Superior glenoid labrum lesion of left shoulder, initial encounter [Y31.131N]  Encounter for other orthopedic aftercare [Z47.89]    Treatment Diagnosis Impaired left shoulder ROM, Impaired left shoulder strength   Date of Evaluation 23      Functional Outcome Measure Used UEFS   Functional Outcome Score 10/80 (23)       Insurance: Primary: Payor: [address] [place] [address]  [address] /  /  / ,   Secondary:    Authorization Information: OT allowed 30 visits per calendar year, aquatics covered, ionto is not covered. Hot/cold packs are not covered   Visit # 6, 6/10 for progress note   Visits Allowed: 24 per patient. She states she used 6 from therapy prior to surgery. Recertification Date: [id number]   Physician Follow-Up: 2023   Physician Orders: Script dated 23 for \"PT/OT shoulder eval and treat 2-3x week x 4-6 weeks. Massive RCR protocol. Please teach HEP, RC strengthening, periscapular strengthening/training. Shoulder ROM with goal full pain free active ROM. Cervical stretching is necessary. \"   Pertinent History: Patient reports she had surgery on her left shoulder on . Per op report, patient had \"left shoulder arthroscopy with biceps tenodesis, extensive debridement of labrum, rotator cuff, and subacromial bursa, and distal clavicle excision. \"  Past medical history is listed as anxiety, COPD, arthritis, fibromyalgia, spinal osteoporesis. Patient is currently in PT for back issues.

## 2023-05-25 ENCOUNTER — HOSPITAL ENCOUNTER (OUTPATIENT)
Dept: PHYSICAL THERAPY | Age: 43
Setting detail: THERAPIES SERIES
Discharge: HOME | End: 2023-05-25
Payer: COMMERCIAL

## 2023-05-25 ENCOUNTER — HOSPITAL ENCOUNTER (OUTPATIENT)
Dept: OCCUPATIONAL THERAPY | Age: 43
Setting detail: THERAPIES SERIES
Discharge: HOME | End: 2023-05-25
Payer: COMMERCIAL

## 2023-05-25 PROCEDURE — 97140 MANUAL THERAPY 1/> REGIONS: CPT

## 2023-05-25 PROCEDURE — 97110 THERAPEUTIC EXERCISES: CPT

## 2023-05-25 NOTE — PROGRESS NOTES
[facility]  PHYSICAL THERAPY  [] EVALUATION  [x] DAILY NOTE (LAND) [] DAILY NOTE (AQUATIC ) [] PROGRESS NOTE [] DISCHARGE NOTE    [x] [facility] [place]   [] [place] [address]    [] [facility]   [] Zaira Beverage    Date: 2023  Patient Name:  Guillermo Mejia  : 1980  MRN: [id number]  CSN: [id number]    Referring Practitioner Eric Galvez, APR*   Diagnosis Radiculopathy, lumbar region [M54.16]; Thoracic pain   Treatment Diagnosis Lumbago, left leg pain   Date of Evaluation 23    Additional Pertinent History COPD, Anxiety Disorder, Arthritis, Osteoporosis      Functional Outcome Measure Used Oswestry   Functional Outcome Score 30, 66.67% (23)       Insurance: Primary: Payor: [address] [place] [address]  [address] /  /  / ,   Secondary:    Authorization Information: INSURANCE THERAPY BENEFIT: No pre-certification is needed. PT, OT and ST are allowed 30 visits each per calendar year. AQUATIC THERAPY COVERED:  Yes   MODALITIES COVERED:  Yes--Iontophoresis is not covered. Hot/Cold Packs are not covered. Visit # 6, 6/10 for progress note   Visits Allowed: 30 (used 6 previously in the year)   Recertification Date: 95   Physician Follow-Up: 23 - another diagnostic nerve block, considering ablation in .    Physician Orders: Eval and treat, massage, dry needling   History of Present Illness: Patient has had back issues her whole life. She broke back when she was 7years old. As she's gotten older, her pain has gotten worse. She also has pain going down left leg, foot caught and she fell in 2021 injuring left shoulder. Had surgery on left shoulder 23. Had lumbar injection in April. She normally gets relief for about 2 months. Last MRI was last year. Patient is not interested in ablation at this time, but is more interested in massage, dry needling, and aquatics.        SUBJECTIVE: This week

## 2023-05-25 NOTE — PROGRESS NOTES
[address] THERAPY  [] EVALUATION  [x] DAILY NOTE (LAND) [] DAILY NOTE (AQUATIC ) [] PROGRESS NOTE [] DISCHARGE NOTE    [x] OUTPATIENT REHABILITATION [facility]   [] [place] [address]    [] [facility]   [] Laura Ulrich    Date: 2023  Patient Name:  Stephy Cano  : 1980  MRN: [id number]  CSN: [id number]    Referring Practitioner Jada Meza PA-C   Diagnosis Primary osteoarthritis, left shoulder [M19.012]  Superior glenoid labrum lesion of left shoulder, initial encounter [L02.894C]  Encounter for other orthopedic aftercare [Z47.89]    Treatment Diagnosis Impaired left shoulder ROM, Impaired left shoulder strength   Date of Evaluation 23      Functional Outcome Measure Used UEFS   Functional Outcome Score 10/80 (23)       Insurance: Primary: Payor: [address] [place] [address]  [address] /  /  / ,   Secondary:    Authorization Information: OT allowed 30 visits per calendar year, aquatics covered, ionto is not covered. Hot/cold packs are not covered   Visit # 7, 7/10 for progress note   Visits Allowed: 24 per patient. She states she used 6 from therapy prior to surgery. Recertification Date: 2023   Physician Follow-Up: 2023   Physician Orders: Script dated 23 for \"PT/OT shoulder eval and treat 2-3x week x 4-6 weeks. Massive RCR protocol. Please teach HEP, RC strengthening, periscapular strengthening/training. Shoulder ROM with goal full pain free active ROM. Cervical stretching is necessary. \"   Pertinent History: Patient reports she had surgery on her left shoulder on . Per op report, patient had \"left shoulder arthroscopy with biceps tenodesis, extensive debridement of labrum, rotator cuff, and subacromial bursa, and distal clavicle excision. \"  Past medical history is listed as anxiety, COPD, arthritis, fibromyalgia, spinal osteoporesis. Patient is currently in PT for back issues.

## 2023-05-30 ENCOUNTER — HOSPITAL ENCOUNTER (OUTPATIENT)
Dept: OCCUPATIONAL THERAPY | Age: 43
Setting detail: THERAPIES SERIES
End: 2023-05-30
Payer: COMMERCIAL

## 2023-05-30 ENCOUNTER — APPOINTMENT (OUTPATIENT)
Dept: PHYSICAL THERAPY | Age: 43
End: 2023-05-30
Payer: COMMERCIAL

## 2023-05-30 PROCEDURE — 97140 MANUAL THERAPY 1/> REGIONS: CPT

## 2023-05-30 PROCEDURE — 97110 THERAPEUTIC EXERCISES: CPT

## 2023-05-30 NOTE — PROGRESS NOTES
tightness noted this date throughout PROM. New progressions to start next week at 7 week post op for patient. Areas for Improvement: edema, impaired ROM, impaired strength, pain, and ADLs  Prognosis: good    GOALS:  Patient Goal:  Be able to use left arm for everyday tasks, decrease pain    Short Term Goals:  Time Frame: 5 weeks  Patient will be independent with UE HEP for increased ease with dressing and doing her hair. Patient will report pain in her left shoulder no greater than 6/10 at rest for increased ease with sleep. Patient will increase left shoulder PROM flexion to 115. ER to 30 for increased ease with dressing and bathing. Long Term Goals:  Time Frame: 12 weeks  1. Patient will improve UEFS to at least 50.  2.  Patient will demonstrate ability to reach overhead for an object with affected extremity without increased pain. 3.  Patient will report pain in left shoulder no greater than 2/10 at rest for increased ease with sleep. Patient Education:   []  HEP/Education Completed: Plan of Care, Goals, precautions  [address] Access Code for HEP: Access Code: [email or web address]  - Circular Shoulder Pendulum with Table Support  - 3 x daily - 7 x weekly - 1 sets - 10 reps  - Flexion-Extension Shoulder Pendulum with Table Support  - 3 x daily - 7 x weekly - 1 sets - 10 reps  - Horizontal Shoulder Pendulum with Table Support  - 3 x daily - 7 x weekly - 1 sets - 10 reps  - Seated Scapular Retraction  - 3 x daily - 7 x weekly - 1 sets - 10 reps  - Seated Elbow Flexion AAROM  - 3 x daily - 7 x weekly - 1 sets - 10 reps  []  No new Education completed  [x]  Reviewed Prior HEP     [x]  Patient verbalized and/or demonstrated understanding of education provided. []  Patient unable to verbalize and/or demonstrate understanding of education provided. Will continue education.   [x]  Barriers to learning: none    PLAN:  Treatment Recommendations: Strengthening, Range of Motion, Manual Therapy - Soft Tissue

## 2023-06-01 ENCOUNTER — HOSPITAL ENCOUNTER (OUTPATIENT)
Dept: OCCUPATIONAL THERAPY | Age: 43
Setting detail: THERAPIES SERIES
Discharge: HOME | End: 2023-06-01
Payer: COMMERCIAL

## 2023-06-01 ENCOUNTER — HOSPITAL ENCOUNTER (OUTPATIENT)
Dept: PHYSICAL THERAPY | Age: 43
Setting detail: THERAPIES SERIES
Discharge: HOME | End: 2023-06-01
Payer: COMMERCIAL

## 2023-06-01 PROCEDURE — 97110 THERAPEUTIC EXERCISES: CPT

## 2023-06-01 PROCEDURE — 97140 MANUAL THERAPY 1/> REGIONS: CPT

## 2023-06-01 NOTE — PROGRESS NOTES
[address] THERAPY  [] EVALUATION  [x] DAILY NOTE (LAND) [] DAILY NOTE (AQUATIC ) [] PROGRESS NOTE [] DISCHARGE NOTE    [x] OUTPATIENT REHABILITATION [facility]   [] [place] [address]    [] [facility]   [] Caraballo Luis    Date: 2023  Patient Name:  Thomas Clemons  : 1980  MRN: [id number]  CSN: [id number]    Referring Practitioner Licha Fowler PA-C   Diagnosis Primary osteoarthritis, left shoulder [M19.012]  Superior glenoid labrum lesion of left shoulder, initial encounter [H19.672V]  Encounter for other orthopedic aftercare [Z47.89]    Treatment Diagnosis Impaired left shoulder ROM, Impaired left shoulder strength   Date of Evaluation 23      Functional Outcome Measure Used UEFS   Functional Outcome Score 10/80 (23)       Insurance: Primary: Payor: [address] [place] [address] [address] /  /  / ,   Secondary:    Authorization Information: OT allowed 30 visits per calendar year, aquatics covered, ionto is not covered. Hot/cold packs are not covered   Visit # 9, 9/10 for progress note   Visits Allowed: 24 per patient. She states she used 6 from therapy prior to surgery. Recertification Date: 2023   Physician Follow-Up: 2023   Physician Orders: Script dated 23 for \"PT/OT shoulder eval and treat 2-3x week x 4-6 weeks. Massive RCR protocol. Please teach HEP, RC strengthening, periscapular strengthening/training. Shoulder ROM with goal full pain free active ROM. Cervical stretching is necessary. \"   Pertinent History: Patient reports she had surgery on her left shoulder on . Per op report, patient had \"left shoulder arthroscopy with biceps tenodesis, extensive debridement of labrum, rotator cuff, and subacromial bursa, and distal clavicle excision. \"  Past medical history is listed as anxiety, COPD, arthritis, fibromyalgia, spinal osteoporesis. Patient is currently in PT for back issues.

## 2023-06-01 NOTE — PROGRESS NOTES
[facility]  PHYSICAL THERAPY  [] EVALUATION  [x] DAILY NOTE (LAND) [] DAILY NOTE (AQUATIC ) [] PROGRESS NOTE [] DISCHARGE NOTE    [x] [facility] [place]   [] [place] [address]    [] [facility]   [] Camila Gonzalez    Date: 2023  Patient Name:  Fly Friend  : 1980  MRN: [id number]  CSN: [id number]    Referring Practitioner Pamela Herman, APR*   Diagnosis Radiculopathy, lumbar region [M54.16]; Thoracic pain   Treatment Diagnosis Lumbago, left leg pain   Date of Evaluation 23    Additional Pertinent History COPD, Anxiety Disorder, Arthritis, Osteoporosis      Functional Outcome Measure Used Oswestry   Functional Outcome Score 30, 66.67% (23)       Insurance: Primary: Payor: [address] [place] [address]  [address] /  /  / ,   Secondary:    Authorization Information: INSURANCE THERAPY BENEFIT: No pre-certification is needed. PT, OT and ST are allowed 30 visits each per calendar year. AQUATIC THERAPY COVERED:  Yes   MODALITIES COVERED:  Yes--Iontophoresis is not covered. Hot/Cold Packs are not covered. Visit # 7, 7/10 for progress note   Visits Allowed: 30 (used 6 previously in the year)   Recertification Date: 0/69/10   Physician Follow-Up: 23 - another diagnostic nerve block, considering ablation in .  Lumbar - nerve block in July as well. Physician Orders: Eval and treat, massage, dry needling   History of Present Illness: Patient has had back issues her whole life. She broke back when she was 7years old. As she's gotten older, her pain has gotten worse. She also has pain going down left leg, foot caught and she fell in 2021 injuring left shoulder. Had surgery on left shoulder 23. Had lumbar injection in April. She normally gets relief for about 2 months. Last MRI was last year.   Patient is not interested in ablation at this time, but is more interested in massage, dry needling, and

## 2023-06-06 ENCOUNTER — HOSPITAL ENCOUNTER (OUTPATIENT)
Dept: OCCUPATIONAL THERAPY | Age: 43
Setting detail: THERAPIES SERIES
Discharge: HOME | End: 2023-06-06
Payer: COMMERCIAL

## 2023-06-06 ENCOUNTER — HOSPITAL ENCOUNTER (OUTPATIENT)
Dept: PHYSICAL THERAPY | Age: 43
Setting detail: THERAPIES SERIES
Discharge: HOME | End: 2023-06-06
Payer: COMMERCIAL

## 2023-06-06 PROCEDURE — 97140 MANUAL THERAPY 1/> REGIONS: CPT

## 2023-06-06 PROCEDURE — 97110 THERAPEUTIC EXERCISES: CPT

## 2023-06-06 NOTE — PROGRESS NOTES
Motion, Functional Mobility Training, Transfer Training, Endurance Training, Gait Training, Manual Therapy - Soft Tissue Mobilization, Pain Management, Home Exercise Program, Patient Education, Safety Education and Training, Positioning, Integrative Dry Needling, Aquatics, and Modalities    []  Plan of care initiated. Plan to see patient 2 times per week for up to 12 weeks to address the treatment planned outlined above.   [x]  Continue with current plan of care  []  Modify plan of care as follows:    []  Hold pending physician visit  []  Discharge    Time In 1020   Time Out 1100   Timed Code Minutes: 30   Total Treatment Time: 40       Electronically Signed by: Eric Renteria, PT

## 2023-06-08 ENCOUNTER — APPOINTMENT (OUTPATIENT)
Dept: PHYSICAL THERAPY | Age: 43
End: 2023-06-08
Payer: COMMERCIAL

## 2023-06-08 ENCOUNTER — HOSPITAL ENCOUNTER (OUTPATIENT)
Dept: OCCUPATIONAL THERAPY | Age: 43
Setting detail: THERAPIES SERIES
Discharge: HOME | End: 2023-06-08
Payer: COMMERCIAL

## 2023-06-08 PROCEDURE — 97140 MANUAL THERAPY 1/> REGIONS: CPT

## 2023-06-08 PROCEDURE — 97110 THERAPEUTIC EXERCISES: CPT

## 2023-06-08 NOTE — PROGRESS NOTES
[address] THERAPY  [] EVALUATION  [x] DAILY NOTE (LAND) [] DAILY NOTE (AQUATIC ) [] PROGRESS NOTE [] DISCHARGE NOTE    [x] OUTPATIENT REHABILITATION [facility]   [] [place] [address]    [] [facility]   [] Kaykay Bachelor    Date: 2023  Patient Name:  Leata Landau  : 1980  MRN: [id number]  CSN: [id number]    Referring Practitioner Hank Barros PA-C   Diagnosis Primary osteoarthritis, left shoulder [M19.012]  Superior glenoid labrum lesion of left shoulder, initial encounter [I20.991H]  Encounter for other orthopedic aftercare [Z47.89]    Treatment Diagnosis Impaired left shoulder ROM, Impaired left shoulder strength   Date of Evaluation 23      Functional Outcome Measure Used UEFS   Functional Outcome Score 10/80 (23)    (23)      Insurance: Primary: Payor: [address] [place] [address] [address] /  /  / ,   Secondary:    Authorization Information: OT allowed 30 visits per calendar year, aquatics covered, ionto is not covered. Hot/cold packs are not covered   Visit # 11, 1/10 for progress note; PN completed 23   Visits Allowed: 24 per patient. She states she used 6 from therapy prior to surgery. Recertification Date: 2023   Physician Follow-Up: July sometime (?)   Physician Orders: Script dated 23 for \"PT/OT shoulder eval and treat 2-3x week x 4-6 weeks. Massive RCR protocol. Please teach HEP, RC strengthening, periscapular strengthening/training. Shoulder ROM with goal full pain free active ROM. Cervical stretching is necessary. \"   Pertinent History: Patient reports she had surgery on her left shoulder on . Per op report, patient had \"left shoulder arthroscopy with biceps tenodesis, extensive debridement of labrum, rotator cuff, and subacromial bursa, and distal clavicle excision. \"  Past medical history is listed as anxiety, COPD, arthritis, fibromyalgia, spinal osteoporesis.   Patient

## 2023-06-15 ENCOUNTER — HOSPITAL ENCOUNTER (OUTPATIENT)
Dept: PHYSICAL THERAPY | Age: 43
Setting detail: THERAPIES SERIES
End: 2023-06-15
Payer: COMMERCIAL

## 2023-06-20 ENCOUNTER — HOSPITAL ENCOUNTER (OUTPATIENT)
Dept: OCCUPATIONAL THERAPY | Age: 43
Setting detail: THERAPIES SERIES
Discharge: HOME | End: 2023-06-20
Payer: COMMERCIAL

## 2023-06-20 ENCOUNTER — HOSPITAL ENCOUNTER (OUTPATIENT)
Dept: OCCUPATIONAL THERAPY | Age: 43
Setting detail: THERAPIES SERIES
End: 2023-06-20
Payer: COMMERCIAL

## 2023-06-20 PROCEDURE — 97140 MANUAL THERAPY 1/> REGIONS: CPT

## 2023-06-20 PROCEDURE — 97110 THERAPEUTIC EXERCISES: CPT

## 2023-06-20 NOTE — PROGRESS NOTES
[address] THERAPY  [] EVALUATION  [x] DAILY NOTE (LAND) [] DAILY NOTE (AQUATIC ) [] PROGRESS NOTE [] DISCHARGE NOTE    [x] OUTPATIENT REHABILITATION [facility]   [] [place] [address]    [] [facility]   [] Sierra Pontif    Date: 2023  Patient Name:  Jens Zapata  : 1980  MRN: [id number]  CSN: [id number]    Referring Practitioner Eb Barbosa PA-C   Diagnosis Primary osteoarthritis, left shoulder [M19.012]  Superior glenoid labrum lesion of left shoulder, initial encounter [P31.015E]  Encounter for other orthopedic aftercare [Z47.89]    Treatment Diagnosis Impaired left shoulder ROM, Impaired left shoulder strength   Date of Evaluation 23      Functional Outcome Measure Used UEFS   Functional Outcome Score 10/80 (23)    (23)      Insurance: Primary: Payor: [address] [place] [address] [address] /  /  / ,   Secondary:    Authorization Information: OT allowed 30 visits per calendar year, aquatics covered, ionto is not covered. Hot/cold packs are not covered   Visit # 13, 3/10 for progress note; PN completed 23   Visits Allowed: 24 per patient. She states she used 6 from therapy prior to surgery. Recertification Date: 2023   Physician Follow-Up: July sometime (?)   Physician Orders: Script dated 23 for \"PT/OT shoulder eval and treat 2-3x week x 4-6 weeks. Massive RCR protocol. Please teach HEP, RC strengthening, periscapular strengthening/training. Shoulder ROM with goal full pain free active ROM. Cervical stretching is necessary. \"   Pertinent History: Patient reports she had surgery on her left shoulder on . Per op report, patient had \"left shoulder arthroscopy with biceps tenodesis, extensive debridement of labrum, rotator cuff, and subacromial bursa, and distal clavicle excision. \"  Past medical history is listed as anxiety, COPD, arthritis, fibromyalgia, spinal osteoporesis.   Patient

## 2023-06-22 ENCOUNTER — HOSPITAL ENCOUNTER (OUTPATIENT)
Dept: OCCUPATIONAL THERAPY | Age: 43
Setting detail: THERAPIES SERIES
Discharge: HOME | End: 2023-06-22
Payer: COMMERCIAL

## 2023-06-22 PROCEDURE — 97110 THERAPEUTIC EXERCISES: CPT

## 2023-06-22 PROCEDURE — 97140 MANUAL THERAPY 1/> REGIONS: CPT

## 2023-06-22 NOTE — PROGRESS NOTES
[address] THERAPY  [] EVALUATION  [x] DAILY NOTE (LAND) [] DAILY NOTE (AQUATIC ) [] PROGRESS NOTE [] DISCHARGE NOTE    [x] OUTPATIENT REHABILITATION [facility]   [] [place] [address]    [] [facility]   [] Irving Calabrese    Date: 2023  Patient Name:  Janneth Cardenas  : 1980  MRN: [id number]  CSN: [id number]    Referring Practitioner Pablo Arevalo PA-C   Diagnosis Primary osteoarthritis, left shoulder [M19.012]  Superior glenoid labrum lesion of left shoulder, initial encounter [W83.780K]  Encounter for other orthopedic aftercare [Z47.89]    Treatment Diagnosis Impaired left shoulder ROM, Impaired left shoulder strength   Date of Evaluation 23      Functional Outcome Measure Used UEFS   Functional Outcome Score 10/80 (23)    (23)      Insurance: Primary: Payor: [address] [place] [address]  [address] /  /  / ,   Secondary:    Authorization Information: OT allowed 30 visits per calendar year, aquatics covered, ionto is not covered. Hot/cold packs are not covered   Visit # 14, 4/10 for progress note; PN completed 23   Visits Allowed: 24 per patient. She states she used 6 from therapy prior to surgery. Recertification Date: 2023   Physician Follow-Up: July sometime (?)   Physician Orders: Script dated 23 for \"PT/OT shoulder eval and treat 2-3x week x 4-6 weeks. Massive RCR protocol. Please teach HEP, RC strengthening, periscapular strengthening/training. Shoulder ROM with goal full pain free active ROM. Cervical stretching is necessary. \"   Pertinent History: Patient reports she had surgery on her left shoulder on . Per op report, patient had \"left shoulder arthroscopy with biceps tenodesis, extensive debridement of labrum, rotator cuff, and subacromial bursa, and distal clavicle excision. \"  Past medical history is listed as anxiety, COPD, arthritis, fibromyalgia, spinal osteoporesis.   Patient

## 2023-06-26 LAB — METER GLUCOSE: 112 MG/DL (ref 70–110)

## 2023-06-27 ENCOUNTER — HOSPITAL ENCOUNTER (OUTPATIENT)
Dept: OCCUPATIONAL THERAPY | Age: 43
Setting detail: THERAPIES SERIES
Discharge: HOME | End: 2023-06-27
Payer: COMMERCIAL

## 2023-06-27 PROCEDURE — 97110 THERAPEUTIC EXERCISES: CPT

## 2023-06-27 PROCEDURE — 97140 MANUAL THERAPY 1/> REGIONS: CPT

## 2023-06-29 ENCOUNTER — HOSPITAL ENCOUNTER (OUTPATIENT)
Dept: OCCUPATIONAL THERAPY | Age: 43
Setting detail: THERAPIES SERIES
Discharge: HOME | End: 2023-06-29
Payer: COMMERCIAL

## 2023-06-29 PROCEDURE — 97110 THERAPEUTIC EXERCISES: CPT

## 2023-07-11 ENCOUNTER — HOSPITAL ENCOUNTER (OUTPATIENT)
Dept: OCCUPATIONAL THERAPY | Age: 43
Setting detail: THERAPIES SERIES
Discharge: HOME | End: 2023-07-11
Payer: COMMERCIAL

## 2023-07-11 PROCEDURE — 97110 THERAPEUTIC EXERCISES: CPT

## 2023-07-11 PROCEDURE — 97140 MANUAL THERAPY 1/> REGIONS: CPT

## 2023-07-13 ENCOUNTER — HOSPITAL ENCOUNTER (OUTPATIENT)
Dept: OCCUPATIONAL THERAPY | Age: 43
Setting detail: THERAPIES SERIES
Discharge: HOME | End: 2023-07-13
Payer: COMMERCIAL

## 2023-07-13 PROCEDURE — 97110 THERAPEUTIC EXERCISES: CPT

## 2023-07-13 NOTE — PROGRESS NOTES
current plan of care  []  Modify plan of care as follows:    []  Hold pending physician visit  [x]  Discharge    Time In 1450   Time Out 1535   Timed Code Minutes: 45 min   Total Treatment Time: 45 min       TONG EVANS/SHAHLA #[id number]

## 2023-07-24 NOTE — PROGRESS NOTES
[address] [place] [address]  Therapy Contact Note      Date: 2023  Patient Name: Karlynn Soulier        MRN: [id number]    Account Number: [de-identified]  : 1980  (40 y.o.)  Gender: female         Set up [address] [place] [address] for the following dates:    23 1445  23 900  23 1015  23 945  23 930  [facility]
currently in PT for back issues. SUBJECTIVE:   Patient saw [facility] on Friday, reports concerned on tightness for ER. Reports all other motion, they were happy with range so far. Patient reports MD does not believe she has TOS, but reports they scheduled another MRI at [facility], open MRI, was cleared for MRI, waiting to hear for scheduled. Patient reports MD is going to wait until the results of MRI to further discuss TOS and shoulder pain and discomfort anterior shoulder. Patient reports she feels like this potential TOS, is going to limit her for progressing with protocol. No f/u is currently scheduled. Heat was cleared by MD on Friday to use on her shoulder. OBJECTIVE:  TREATMENT   Precautions: Allergic to latex, tape adhesive.   Surgery on April 17th, no resistive elbow flexion x 6 weeks, follow Dr. Celi Lee massive RCR protocol per script    Pain: 5-6/10 left shoulder posterior - trap, going down medial boarder, tricep muscle with elbow extension      X in shaded column indicates Activity Completed Today   Modalities Parameters/  Location  Notes/Comments                     Manual Therapy Time/  Technique  Notes/Comments   STM to L upper trap, subclavius, scalenes  X To decrease pain around clavicle area   Sidelying scapular mobilizations            Exercises   Sets/  Sec Reps  Notes/Comments   Pendulums forward/back, side to side and circular 1 10 ea  Min cues for technique   Scap retraction and depression 1 10 ea X    PROM in supine with towel roll under arm ER and shoulder flexion, scaption within parameters of protocol   X ER 40  FLEX 140     Passive ER in supine with dowel romario and elbow on towel roll 5 sec 10 X ~35 degrees   Education to patient to hold at end range for longer duration    Supine self-assist flexion with elbow bent to 90 degrees 2 sec 10 X Patient is now 7 week post op, completed 5  reps this date to tolerance, and patient was able to achieve ~130 degrees    Scar massage
Patient/EMS

## 2023-07-26 ENCOUNTER — HOSPITAL ENCOUNTER (OUTPATIENT)
Dept: PHYSICAL THERAPY | Age: 43
Setting detail: THERAPIES SERIES
Discharge: HOME | End: 2023-07-26
Payer: COMMERCIAL

## 2023-07-26 PROCEDURE — 97750 PHYSICAL PERFORMANCE TEST: CPT

## 2023-07-26 NOTE — DISCHARGE SUMMARY
Right Hand Norms   1 20#  35#    2 20#, 10#, 10#  (Average: 13.3#) 57-84# 30#, 30#, 30#  (Average: 30#) 48.5-76#   3 5#  20#    4 5#  15#    5 5#  15#      [address] STRENGTH (TESTED WITH [address] METER)    Left Left Hand Norms Right Right Hand Norms   2 Point Tip 5#, 4#, 4#  (Average: 4.3#) 8-14# 6#, 8#, 6#  (Average: 6.6#) 9-14#   3 Jaw Cezar 5#, 7#, 7#  (Average: 6.3#) 13-20# 5#, 6#, 7#  (Average: 6#) 14-20#   Lateral/Pinch 11#, 8#, 9#  (Average: 9.3#) 13-19# 7#, 8#, 8#   (Average: 7.6#) 14-20#     [place] RATE OF MANIPULATION    Placing (seconds) Turning (seconds)   Test 1 93 seconds 107 seconds   Test 2 87 seconds 87 seconds   Total 180 seconds 194 seconds   Percentile Rank <1st percentile  <1st percentile      PURDUE PEGBOARD TEST    # Completed Percentile Rank   Right Hand (pegs) 12 pegs 1st percentile    Left Hand (pegs) 9 pegs < 1st percentile    Both (pairs) 7 pairs < 1st percentile    Assembly (pieces) 11 pieces  < 1st percentile      *please note that patient did have long extension nails on and may have impacted testing     MOBILITY EVALUATION    Completed Comments   Forward Walking (10 feet) Yes Patient ambulates with no AD with antalgic gait, decreased weight shift L, hip drop, rigid gait with decreased trunk rotation/arm swing. Backward Walking (10 feet) Yes Decreased balance noted, slow jameson, decreased weight shift L   Heel/Toe Walk (10 feet) Yes Decreased balance noted, able to complete 3-7 steps prior to a LOB.    Walk on Toes (10 feet) Yes Decreased balance noted, decreased weight shift L   Walk on Heels (10 feet) Yes Required 1 UE support at times, impaired balance noted    Balance on Right Foot (5 sec) Yes 5 seconds without UE support   Balance on Left Foot (5 sec) No 2-3 seconds without UE support, instability noted      NON MATERIAL HANDLING    Completed Comments   Sitting (30 minutes) No Patient able to sit for 13 minutes at a time prior to having to stand due to increased pain   Standing (30

## 2023-08-16 NOTE — PROGRESS NOTES
[facility]  Therapy Contact Note      Date: 2023  Patient Name: Madisyn Narayanan        MRN: [id number]    Account Number: [de-identified]  : 1980  (44 y.o.)  Gender: female         Set up [facility] transportation for therapy on 23 0745. Could not confirm a  time with the patient since her voicemail was full.     Mani Castillo, Rehab Tech

## 2023-08-24 ENCOUNTER — OFFICE VISIT (OUTPATIENT)
Dept: ORTHOPEDIC SURGERY | Age: 43
End: 2023-08-24

## 2023-08-24 VITALS — BODY MASS INDEX: 36.71 KG/M2 | RESPIRATION RATE: 14 BRPM | WEIGHT: 187 LBS | HEIGHT: 60 IN

## 2023-08-24 DIAGNOSIS — M54.12 CERVICAL RADICULOPATHY: ICD-10-CM

## 2023-08-24 DIAGNOSIS — M54.2 NECK PAIN: ICD-10-CM

## 2023-08-24 DIAGNOSIS — M54.50 LOW BACK PAIN, UNSPECIFIED BACK PAIN LATERALITY, UNSPECIFIED CHRONICITY, UNSPECIFIED WHETHER SCIATICA PRESENT: Primary | ICD-10-CM

## 2023-08-24 DIAGNOSIS — M25.512 LEFT SHOULDER PAIN, UNSPECIFIED CHRONICITY: ICD-10-CM

## 2023-08-24 RX ORDER — BETAMETHASONE SODIUM PHOSPHATE AND BETAMETHASONE ACETATE 3; 3 MG/ML; MG/ML
6 INJECTION, SUSPENSION INTRA-ARTICULAR; INTRALESIONAL; INTRAMUSCULAR; SOFT TISSUE ONCE
Status: COMPLETED | OUTPATIENT
Start: 2023-08-24 | End: 2023-08-24

## 2023-08-24 RX ORDER — LIDOCAINE HYDROCHLORIDE 10 MG/ML
1 INJECTION, SOLUTION INFILTRATION; PERINEURAL ONCE
Status: COMPLETED | OUTPATIENT
Start: 2023-08-24 | End: 2023-08-24

## 2023-08-24 RX ADMIN — LIDOCAINE HYDROCHLORIDE 1 ML: 10 INJECTION, SOLUTION INFILTRATION; PERINEURAL at 13:12

## 2023-08-24 RX ADMIN — BETAMETHASONE SODIUM PHOSPHATE AND BETAMETHASONE ACETATE 6 MG: 3; 3 INJECTION, SUSPENSION INTRA-ARTICULAR; INTRALESIONAL; INTRAMUSCULAR; SOFT TISSUE at 13:12

## 2023-08-24 NOTE — PROGRESS NOTES
Patient ID: Frandy Gonzalez is a 44 y.o. female    Chief Compliant:  Chief Complaint   Patient presents with    Back Pain      HPI:  Patient is a 44 y.o. female with history of fibromyalgia who presents to the clinic today to establish care for her neck pain. Patient reports pain to her left neck and shoulder with intermittent radicular symptoms. Patient has a complicated shoulder history however as she recently on 4/17/23 had left shoulder arthroscopy with bicep tenodesis debridement, distal clavicle excision and has had pain to the left clavicle, anterior left chest and posterior scapula region. Patient states that she has followed up with Dr. Los Cash who completed her shoulder surgery and provided multiple shoulder injections with not much relief. Patient has been Seeing a spine surgeon in [place], [place] as well as Dr. Elbert Blankenship for pain management who has sent her for PT of her neck pain within the last two weeks. She's them again in another month for another evaluation. At this time she wants to get established with a spine specialist in [facility]. Patient with long history of low back pain and has completed PT and pain management for ablations to her low back with some relief. Review of Systems   Constitutional: Negative for fever, chills, sweats. Eyes: Negative for changes in vision, or pain. HENT: Negative for ear ache, epistaxis, or sore throat. Respiratory/Cardio: Negative for Chest pain, palpitations, SOB, or cough. Gastrointestinal: Negative for abdominal pain, N/V/D. Genitourinary: Negative for dysuria, frequency, urgency, or hematuria. Neurological: Negative for headache, numbness, or weakness. Integumentary: Negative for rash, itching, laceration, or abrasion.    Musculoskeletal: Positive for Back Pain     Past History:    Current Outpatient Medications:     albuterol sulfate HFA (VENTOLIN HFA) 108 (90 Base) MCG/ACT inhaler, Inhale 2 puffs into the lungs every 6 hours as needed for

## 2023-08-29 ENCOUNTER — APPOINTMENT (OUTPATIENT)
Dept: PHYSICAL THERAPY | Age: 43
End: 2023-08-29
Payer: COMMERCIAL

## 2023-08-29 ENCOUNTER — HOSPITAL ENCOUNTER (OUTPATIENT)
Dept: PHYSICAL THERAPY | Age: 43
Setting detail: THERAPIES SERIES
Discharge: HOME | End: 2023-08-29
Payer: COMMERCIAL

## 2023-08-29 PROCEDURE — 97162 PT EVAL MOD COMPLEX 30 MIN: CPT

## 2023-08-29 NOTE — PROGRESS NOTES
[address]  Therapy Contact Note      Date: 2023  Patient Name: Lani James        MRN: [id number]    Account Number: [de-identified]  : 1980  (44 y.o.)  Gender: female         Set up [employer] for the following dates:    23 945  23 900  23 1300  9/15/23 1030  23 1045  23 1300  23 1145  23 1130  10/2/23 1130    Patient notified.     Ambrosio Louis, Rehab Tech

## 2023-08-29 NOTE — PROGRESS NOTES
** PLEASE SIGN, DATE AND TIME CERTIFICATION BELOW AND RETURN TO [facility] OUTPATIENT REHABILITATION (FAX #: [phone]). ATTEST/CO-SIGN IF ACCESSING VIA INTime Warden. THANK YOU.**    I certify that I have examined the patient below and determined that Physical Medicine and Rehabilitation service is necessary and that I approve the established plan of care for up to 90 days or as specifically noted. Attestation, signature or co-signature of physician indicates approval of certification requirements.    ________________________ ____________ __________  Physician Signature   Date   Time  [facility]  PHYSICAL THERAPY  [x] EVALUATION  [] DAILY NOTE (LAND) [] DAILY NOTE (AQUATIC ) [] PROGRESS NOTE [] DISCHARGE NOTE    [x] OUTPATIENT REHABILITATION [facility]   [] [facility]    [] [facility]   [] Fede Carrascoro    Date: 2023  Patient Name:  Phill Negro  : 1980  MRN: [id number]  CSN: [id number]    Referring Practitioner Rebeca Mooney, APR*   Diagnosis Cervicalgia [M54.2]  Myalgia, other site [M79.18]  Other chronic pain [G89.29]    Treatment Diagnosis Left shoulder pain, decreased left shoulder ROM, decreased left shoulder strength, decreased cervical ROM    Date of Evaluation 23    Additional Pertinent History IBS, Spondylolisthesis, insulin resistance, Abdominal adhesion sx., Left shoulder bicep tenodesis/clavicle excision       Functional Outcome Measure Used NDI   Functional Outcome Score 35/50 (23)       Insurance: Primary: Payor: [address] [place] [address] /  /  / ,   Secondary:    Authorization Information: OUTPATIENT BENEFITS:              DEDUCTIBLE:  n/a              OUT OF POCKET: n/a               INSURANCE PAYS AT: 100%               PATIENT RESPONSIBILITY AND/OR CO-PAY:  N/a  PRECERTIFICATION REQUIRED: No  INSURANCE THERAPY BENEFIT: No pre-certification is needed.   PT, OT and ST are allowed 30 visits each per calendar

## 2023-08-31 ENCOUNTER — HOSPITAL ENCOUNTER (OUTPATIENT)
Dept: PHYSICAL THERAPY | Age: 43
Setting detail: THERAPIES SERIES
Discharge: HOME | End: 2023-08-31
Payer: COMMERCIAL

## 2023-08-31 PROCEDURE — 97140 MANUAL THERAPY 1/> REGIONS: CPT

## 2023-08-31 PROCEDURE — 97110 THERAPEUTIC EXERCISES: CPT
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
## 2023-09-07 ENCOUNTER — HOSPITAL ENCOUNTER (OUTPATIENT)
Dept: PHYSICAL THERAPY | Age: 43
Setting detail: THERAPIES SERIES
Discharge: HOME | End: 2023-09-07
Payer: COMMERCIAL

## 2023-09-07 PROCEDURE — 97140 MANUAL THERAPY 1/> REGIONS: CPT

## 2023-09-07 PROCEDURE — 97110 THERAPEUTIC EXERCISES: CPT

## 2023-09-07 NOTE — PROGRESS NOTES
Self-first rib mobilization with towel HEP  x      Interventions Next Treatment: Dry needling, Shoulder/Cervical mobility, shoulder girdle strength, manual prn for pain/mobility. Activity/Treatment Tolerance:  []  Patient tolerated treatment well  []  Patient limited by fatigue  []  Patient limited by pain   []  Patient limited by medical complications  []  Other:     Assessment: Initiated session with dry needling today as patient had relief from this treatment. Continued manual therapy as patient did find this helpful last session also. Patient did stay in supine for first rib mobilization today as the seated position was painful for her low back. Patient was provided eduction on self first rib mobilization with towel to perform at home. Patient reports improved pain to 6/10 after the session. Will progress patient as able to improve functional mobility. Dry needling manual therapy: consisted on the placement of 9 needles in the following muscles: Cervical Paraspinals C3-5 30mm, Dorsal Scapular Homeostatic Point 30mm, Suprascapular Homeostatic Point 40 mm, Spinal Accessory Homeostatic Point ,40 mm x 2. A 40 mm needle was inserted and pistoned to produce intramuscular mobilization. These techniques were used to restore functional range of motion, reduce muscle spasm and induce healing in the corresponding musculature. (89768)  Clean Technique was utilized today while applying Dry needling treatment. The treatment sites where cleaned with 70% solution of isopropyl alcohol . The PT washed their hands and utilized treatment gloves along with hand  prior to inserting the needles. All needles where removed and discarded in the appropriate sharps container. GOALS:  Patient Goal: Improve cervical/shoulder symptoms     Short Term Goals: 5 weeks  Pt to improve cervical/shoulder pain from 8/10 to <= 4/10 for improved activity tolerance.    Long Term Goals: 8 weeks   Pt to demo cervical

## 2023-09-11 ENCOUNTER — HOSPITAL ENCOUNTER (OUTPATIENT)
Dept: PHYSICAL THERAPY | Age: 43
Setting detail: THERAPIES SERIES
Discharge: HOME | End: 2023-09-11
Payer: COMMERCIAL

## 2023-09-11 PROCEDURE — 97140 MANUAL THERAPY 1/> REGIONS: CPT

## 2023-09-11 PROCEDURE — 97110 THERAPEUTIC EXERCISES: CPT
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
## 2023-09-13 ENCOUNTER — APPOINTMENT (OUTPATIENT)
Dept: PHYSICAL THERAPY | Age: 43
End: 2023-09-13
Payer: COMMERCIAL

## 2023-09-15 ENCOUNTER — HOSPITAL ENCOUNTER (OUTPATIENT)
Dept: PHYSICAL THERAPY | Age: 43
Setting detail: THERAPIES SERIES
Discharge: HOME | End: 2023-09-15
Payer: COMMERCIAL

## 2023-09-15 PROCEDURE — 97140 MANUAL THERAPY 1/> REGIONS: CPT

## 2023-09-15 PROCEDURE — 97110 THERAPEUTIC EXERCISES: CPT
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
## 2023-09-20 ENCOUNTER — HOSPITAL ENCOUNTER (OUTPATIENT)
Dept: PHYSICAL THERAPY | Age: 43
Setting detail: THERAPIES SERIES
Discharge: HOME | End: 2023-09-20
Payer: COMMERCIAL

## 2023-09-20 PROCEDURE — 97140 MANUAL THERAPY 1/> REGIONS: CPT

## 2023-09-20 PROCEDURE — 97110 THERAPEUTIC EXERCISES: CPT

## 2023-09-20 NOTE — PROGRESS NOTES
Exercise/Intervention   Notes   Chin Tuck  [id number]      UT Stretch        Supine Pec Stretch (Major/Minor)  2x30 sec      Scalene Stretch *        S/l Shoulder ER  2x10  x Cueing to avoid compensatory mechanics    S/L shoulder abduction  2*x10  x 1st set ROM limited by shoulder pain, improving with 2nd set    Seated Scapular retraction *  2x8  x    Prone I, T, Y                               Interventions Next Treatment: Dry needling, Shoulder/Cervical mobility, shoulder girdle strength, manual prn for pain/mobility. Activity/Treatment Tolerance:  [x]  Patient tolerated treatment well  []  Patient limited by fatigue  []  Patient limited by pain   []  Patient limited by medical complications  []  Other:     Assessment: Initiated session with dry needling as described above and below. Pt continues to note decreased pain intensity with completion. Added seated rows, Pt demo improved motor control with cueing and repetitions c completion. Pt noted decreased pain intensity to a 6/10 at the completion of the PT session. Dry needling manual therapy: consisted on the placement of 14 needles in the following muscles: Cervical Paraspinals C3-5 30mm, Dorsal Scapular Homeostatic Point 30mm, Suprascapular Homeostatic Point 40 mm, Spinal Accessory Homeostatic Point and symptomatic point,40 mm x 2. A 40 mm needle was inserted and pistoned to produce intramuscular mobilization. These techniques were used to restore functional range of motion, reduce muscle spasm and induce healing in the corresponding musculature. (85762)  Clean Technique was utilized today while applying Dry needling treatment. The treatment sites where cleaned with 70% solution of isopropyl alcohol . The PT washed their hands and utilized treatment gloves along with hand  prior to inserting the needles. All needles where removed and discarded in the appropriate sharps container.        GOALS:  Patient Goal: Improve cervical/shoulder symptoms

## 2023-09-22 ENCOUNTER — HOSPITAL ENCOUNTER (OUTPATIENT)
Dept: PHYSICAL THERAPY | Age: 43
Setting detail: THERAPIES SERIES
Discharge: HOME | End: 2023-09-22
Payer: COMMERCIAL

## 2023-09-22 PROCEDURE — 97140 MANUAL THERAPY 1/> REGIONS: CPT

## 2023-09-22 PROCEDURE — 97110 THERAPEUTIC EXERCISES: CPT
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
## 2023-09-24 ENCOUNTER — HOSPITAL ENCOUNTER (EMERGENCY)
Age: 43
Discharge: HOME | End: 2023-09-24
Payer: COMMERCIAL

## 2023-09-24 VITALS
SYSTOLIC BLOOD PRESSURE: 141 MMHG | HEART RATE: 80 BPM | DIASTOLIC BLOOD PRESSURE: 95 MMHG | RESPIRATION RATE: 16 BRPM | TEMPERATURE: 98.4 F | OXYGEN SATURATION: 97 %

## 2023-09-24 DIAGNOSIS — J02.9 SORE THROAT: ICD-10-CM

## 2023-09-24 DIAGNOSIS — J01.91 ACUTE RECURRENT SINUSITIS, UNSPECIFIED LOCATION: Primary | ICD-10-CM

## 2023-09-24 LAB
S PYO AG THROAT QL: NEGATIVE
S PYO THROAT QL CULT: NORMAL

## 2023-09-24 PROCEDURE — 6370000000 HC RX 637 (ALT 250 FOR IP): Performed by: NURSE PRACTITIONER

## 2023-09-24 PROCEDURE — 87880 STREP A ASSAY W/OPTIC: CPT

## 2023-09-24 PROCEDURE — 99283 EMERGENCY DEPT VISIT LOW MDM: CPT

## 2023-09-24 PROCEDURE — 87070 CULTURE OTHR SPECIMN AEROBIC: CPT

## 2023-09-24 RX ORDER — CETIRIZINE HYDROCHLORIDE 10 MG/1
10 TABLET ORAL DAILY
Qty: 30 TABLET | Refills: 0 | Status: SHIPPED | OUTPATIENT
Start: 2023-09-24 | End: 2023-10-24

## 2023-09-24 RX ORDER — AMOXICILLIN AND CLAVULANATE POTASSIUM 875; 125 MG/1; MG/1
1 TABLET, FILM COATED ORAL 2 TIMES DAILY
Qty: 14 TABLET | Refills: 0 | Status: SHIPPED | OUTPATIENT
Start: 2023-09-24 | End: 2023-10-01

## 2023-09-24 RX ADMIN — Medication 10 ML: at 21:47

## 2023-09-25 ENCOUNTER — HOSPITAL ENCOUNTER (OUTPATIENT)
Dept: PHYSICAL THERAPY | Age: 43
Setting detail: THERAPIES SERIES
End: 2023-09-25
Payer: COMMERCIAL

## 2023-09-25 NOTE — ED TRIAGE NOTES
Pt to er. Pt c/o sore throat and cough. Pt states she took 2 home covid tests last one today and was negative. Pt refusing covid swab at this time.

## 2023-09-25 NOTE — ED PROVIDER NOTES
[facility] Emergency Department    CHIEF COMPLAINT       Chief Complaint   Patient presents with    Sore Throat       Nurses Notes reviewed and I agree except as noted in the HPI. HISTORY OF PRESENT ILLNESS   Chelsey Chavez is a 44 y.o. female who presents to the ED for evaluation of sore throat. Patient notes sore throat been ongoing for the last couple of days, notes that she saw her primary care provider approximately 2 weeks ago for sinus congestion, was prescribed Omnicef, she notes improvement while on this but seemed to get worse after. Notes that she has been taking elderberry, and drinking teas with no significant improvement. Notes that she has had a low-grade fever of 101 degrees for the last several days. She notes a history of tonsillectomy. She notes stiffness in her neck, but notes history of chronic neck pain, currently getting dry needling and physical therapy due to chronic neck pain. She notes that she has had some diarrhea and vomiting associated with the drainage. She notes a cough that seems to be worse at night. She notes a past medical history of COPD, she had been using her inhaler more often than normal but she notes cough and wheezing have improved. She notes that she is increased the amount of NSAIDs she takes, and she believes her reflux may be exacerbated from this. HPI was provided by the patient. PAST MEDICAL HISTORY     Past Medical History:   Diagnosis Date    Back pain     chronic    Cystitis, interstitial     Esophageal ulcer     Headache(784.0)     Hyperlipidemia     Insulin resistance     Irritable bowel syndrome     Spondylolisthesis        SURGICALHISTORY      has a past surgical history that includes cyst removal (01/01/2011); Hysterectomy (2010); Abdominal adhesion surgery (01/01/2012); Colonoscopy (2005, 2011); Tonsillectomy and adenoidectomy (01/01/1987); [place] tooth extraction (01/01/1996);  Ovary removal (2010); and Shoulder arthroscopy

## 2023-09-26 ENCOUNTER — HOSPITAL ENCOUNTER (OUTPATIENT)
Dept: PHYSICAL THERAPY | Age: 43
Setting detail: THERAPIES SERIES
Discharge: HOME | End: 2023-09-26
Payer: COMMERCIAL

## 2023-09-26 LAB — BACTERIA THROAT AEROBE CULT: NORMAL

## 2023-09-26 PROCEDURE — 97140 MANUAL THERAPY 1/> REGIONS: CPT
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
## 2023-09-27 ENCOUNTER — APPOINTMENT (OUTPATIENT)
Dept: PHYSICAL THERAPY | Age: 43
End: 2023-09-27
Payer: COMMERCIAL

## 2023-10-02 ENCOUNTER — HOSPITAL ENCOUNTER (OUTPATIENT)
Dept: PHYSICAL THERAPY | Age: 43
Setting detail: THERAPIES SERIES
Discharge: HOME | End: 2023-10-02
Payer: COMMERCIAL

## 2023-10-02 PROCEDURE — 97110 THERAPEUTIC EXERCISES: CPT

## 2023-10-02 PROCEDURE — 97140 MANUAL THERAPY 1/> REGIONS: CPT

## 2023-10-04 ENCOUNTER — HOSPITAL ENCOUNTER (OUTPATIENT)
Dept: OCCUPATIONAL THERAPY | Age: 43
Setting detail: THERAPIES SERIES
Discharge: HOME | End: 2023-10-04
Payer: COMMERCIAL

## 2023-10-04 PROCEDURE — 97165 OT EVAL LOW COMPLEX 30 MIN: CPT

## 2023-10-04 PROCEDURE — 97110 THERAPEUTIC EXERCISES: CPT

## 2023-10-04 NOTE — PROGRESS NOTES
** PLEASE SIGN, DATE AND TIME CERTIFICATION BELOW AND RETURN TO [facility] OUTPATIENT REHABILITATION (FAX #: [phone]). ATTEST/CO-SIGN IF ACCESSING VIA [email or web address]. THANK YOU.**    I certify that I have examined the patient below and determined that Physical Medicine and Rehabilitation service is necessary and that I approve the established plan of care for up to 90 days or as specifically noted. Attestation, signature or co-signature of physician indicates approval of certification requirements.    ________________________ ____________ __________  Physician Signature   Date   Time   [address] THERAPY  [x] EVALUATION  [] DAILY NOTE (LAND) [] DAILY NOTE (AQUATIC ) [] PROGRESS NOTE [] DISCHARGE NOTE    [x] [address] [facility] [address] - [place]   [] [facility]    [] [facility]   [] Millicent Tolentino    Date: 10/4/2023  Patient Name:  Leoncio Hummel  : 1980  MRN: [id number]  CSN: [id number]    Referring Practitioner Jaylen Schmitz, APR*   Diagnosis Cervicalgia [M54.2]  Myalgia, other site [M79.18]  Other chronic pain [G89.29]    Treatment Diagnosis Left shoulder pain   Date of Evaluation 10/4/23      Functional Outcome Measure Used UEFS   Functional Outcome Score 26/80 (10/4/23)       Insurance: Primary: Payor: [address] [place] [address] /  /  / ,   Secondary:    Authorization Information: PRECERTIFICATION REQUIRED: No  INSURANCE THERAPY BENEFIT: No pre-certification is needed. PT, OT and ST are allowed 30 visits each per calendar year. AQUATIC THERAPY COVERED:  Yes   MODALITIES COVERED:  Yes--Iontophoresis is not covered. Hot/Cold Packs are not covered. Visit # 1, 1/10 for progress note   Visits Allowed: 30.  Patient has used 27 at this time.    Recertification Date:    Physician Follow-Up: Dec. 1, 2023   Physician Orders: Shoulder eval and treat, 2-3 wk 4-6 weeks, Please teach HEP, RC strengthening/training, shoulder

## 2023-10-09 ENCOUNTER — HOSPITAL ENCOUNTER (OUTPATIENT)
Dept: OCCUPATIONAL THERAPY | Age: 43
Setting detail: THERAPIES SERIES
Discharge: HOME | End: 2023-10-09
Payer: COMMERCIAL

## 2023-10-09 PROCEDURE — 97140 MANUAL THERAPY 1/> REGIONS: CPT

## 2023-10-09 PROCEDURE — 97110 THERAPEUTIC EXERCISES: CPT

## 2023-10-09 NOTE — PROGRESS NOTES
[address] THERAPY  [] EVALUATION  [x] DAILY NOTE (LAND) [] DAILY NOTE (AQUATIC ) [] PROGRESS NOTE [] DISCHARGE NOTE    [x] OUTPATIENT REHABILITATION CENTER - [place]   [] [address] [place] [address]    [] [facility]   [] [facility]    Date: 10/9/2023  Patient Name:  Darin Apley  : 1980  MRN: [id number]  CSN: [id number]    Referring Practitioner Shaina Christina DO   Diagnosis M25.512 Acute pain of left shoulder  S49. 92XA Injury of left shoulder   Treatment Diagnosis Left shoulder pain   Date of Evaluation 10/4/23      Functional Outcome Measure Used UEFS   Functional Outcome Score 26/80 (10/4/23)       Insurance: Primary: Payor: [employer] /  /  / ,   Secondary:    Authorization Information: PRECERTIFICATION REQUIRED: No  INSURANCE THERAPY BENEFIT: No pre-certification is needed. PT, OT and ST are allowed 30 visits each per calendar year. AQUATIC THERAPY COVERED:  Yes   MODALITIES COVERED:  Yes--Iontophoresis is not covered. Hot/Cold Packs are not covered. Visit # 2, 2/10 for progress note   Visits Allowed: 30.  Patient has used 27 at this time. Recertification Date:    Physician Follow-Up: Dec. 1, 2023   Physician Orders: Shoulder eval and treat, 2-3 wk 4-6 weeks, Please teach HEP, RC strengthening/training, shoulder ROM with goal of full pain free active ROM, cervical stretching as needed. Pertinent History: Patient reports she had surgery on her left shoulder to bicep tendonesis, bursectomy and DCR in April of this year. Patient had therapy on her left shoulder but still has pain and reports of popping. She states they found she has an elevated first rib. MRI is scheduled on Oct. 20th. Patient reports she has a mammogram scheduled on Oct. 13th due to some nodules on her left breast.  She states she went to [facility] to look at her spine. The doctor in [facility] says she may have a torn trapezius muscle.   Past

## 2023-10-17 ENCOUNTER — HOSPITAL ENCOUNTER (OUTPATIENT)
Dept: PHYSICAL THERAPY | Age: 43
Setting detail: THERAPIES SERIES
Discharge: HOME | End: 2023-10-17
Payer: COMMERCIAL

## 2023-10-17 PROCEDURE — 97140 MANUAL THERAPY 1/> REGIONS: CPT
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
## 2023-10-18 ENCOUNTER — HOSPITAL ENCOUNTER (OUTPATIENT)
Dept: OCCUPATIONAL THERAPY | Age: 43
Setting detail: THERAPIES SERIES
Discharge: HOME | End: 2023-10-18
Payer: COMMERCIAL

## 2023-10-18 PROCEDURE — 97110 THERAPEUTIC EXERCISES: CPT

## 2023-10-18 NOTE — PROGRESS NOTES
[address] THERAPY  [] EVALUATION  [] DAILY NOTE (LAND) [] DAILY NOTE (AQUATIC ) [x] PROGRESS NOTE [] DISCHARGE NOTE    [x] [facility] [place]   [] [address] [place] [address]    [] [facility]   [] Maeve Hummelemann    Date: 10/18/2023  Patient Name:  Sage Mendoza  : 1980  MRN: [id number]  CSN: [id number]    Referring Practitioner Jet Bains DO   Diagnosis M25.512 Acute pain of left shoulder  S49. 92XA Injury of left shoulder   Treatment Diagnosis Left shoulder pain   Date of Evaluation 10/4/23      Functional Outcome Measure Used UEFS   Functional Outcome Score  (10/4/23)  (10/18/23)      Insurance: Primary: Payor: [facility] [employer]  /  / ,   Secondary:    Authorization Information: PRECERTIFICATION REQUIRED: No  INSURANCE THERAPY BENEFIT: No pre-certification is needed. PT, OT and ST are allowed 30 visits each per calendar year. AQUATIC THERAPY COVERED:  Yes   MODALITIES COVERED:  Yes--Iontophoresis is not covered. Hot/Cold Packs are not covered. Visit # 3, 3/10 for progress note   Visits Allowed: 30.  Patient has used 27 at this time. Recertification Date:    Physician Follow-Up: Dec. 1, 2023   Physician Orders: Shoulder eval and treat, 2-3 wk 4-6 weeks, Please teach HEP, RC strengthening/training, shoulder ROM with goal of full pain free active ROM, cervical stretching as needed. Pertinent History: Patient reports she had surgery on her left shoulder to bicep tendonesis, bursectomy and DCR in April of this year. Patient had therapy on her left shoulder but still has pain and reports of popping. She states they found she has an elevated first rib. MRI is scheduled on Oct. 20th. Patient reports she has a mammogram scheduled on Oct. 13th due to some nodules on her left breast.  She states she went to [facility] to look at her spine.   The doctor in [facility] says she may have a torn

## 2023-10-19 NOTE — PROGRESS NOTES
[facility]  Therapy Contact Note      Date: 10/19/2023  Patient Name: Vidya Maya        MRN: [id number]    Account Number: [de-identified]  : 1980  (44 y.o.)  Gender: female       [address] was cancelled with Felipa at [facility] for 10/23/23 at 1000 per therapist request.    Jean Pierre Obrien, Rehab Tech

## 2023-10-23 ENCOUNTER — APPOINTMENT (OUTPATIENT)
Dept: OCCUPATIONAL THERAPY | Age: 43
End: 2023-10-23
Payer: COMMERCIAL

## 2023-10-30 ENCOUNTER — HOSPITAL ENCOUNTER (OUTPATIENT)
Dept: OCCUPATIONAL THERAPY | Age: 43
Setting detail: THERAPIES SERIES
Discharge: HOME | End: 2023-10-30
Payer: COMMERCIAL

## 2023-10-30 NOTE — DISCHARGE SUMMARY
[address] [address] DISCHARGE NOTE  [address]    Date: 10/30/2023  Patient Name: Aydee Cleary        CSN: [id number]   : 1980  (44 y.o.)  Gender: female   Bart Chan, [place]*,    Cervicalgia [M54.2]  Myalgia, other site [M79.18]  Other chronic pain [G89.29],      Patient is discharged from Occupational Therapy services at this time. See last note for details related to results of therapy and goal achievement. Reason for discharge:  Received further auth from insurance and called patient to schedule. Patient states she had MRI of her neck and will likely have surgery. Patient was told to hold therapy at this time. Patient reports her ER ROM is okay and is agreeable to discharge from formal OT at this time.        Sunil Romano, OTR/L #[id number]

## 2023-10-31 ENCOUNTER — HOSPITAL ENCOUNTER (OUTPATIENT)
Dept: PHYSICAL THERAPY | Age: 43
Setting detail: THERAPIES SERIES
Discharge: HOME | End: 2023-10-31
Payer: COMMERCIAL

## 2023-10-31 PROCEDURE — 97140 MANUAL THERAPY 1/> REGIONS: CPT
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
## 2023-11-01 ENCOUNTER — HOSPITAL ENCOUNTER (OUTPATIENT)
Age: 43
Discharge: HOME | End: 2023-11-01
Payer: COMMERCIAL

## 2023-11-01 ENCOUNTER — HOSPITAL ENCOUNTER (OUTPATIENT)
Dept: GENERAL RADIOLOGY | Age: 43
Discharge: HOME | End: 2023-11-01
Payer: COMMERCIAL

## 2023-11-01 ENCOUNTER — OFFICE VISIT (OUTPATIENT)
Dept: RHEUMATOLOGY | Age: 43
End: 2023-11-01
Payer: COMMERCIAL

## 2023-11-01 VITALS
HEART RATE: 83 BPM | SYSTOLIC BLOOD PRESSURE: 122 MMHG | HEIGHT: 60 IN | BODY MASS INDEX: 40.17 KG/M2 | DIASTOLIC BLOOD PRESSURE: 78 MMHG | WEIGHT: 204.6 LBS | OXYGEN SATURATION: 98 %

## 2023-11-01 DIAGNOSIS — M54.89 INFLAMMATORY BACK PAIN: ICD-10-CM

## 2023-11-01 DIAGNOSIS — M54.89 INFLAMMATORY BACK PAIN: Primary | ICD-10-CM

## 2023-11-01 LAB
ALBUMIN SERPL BCG-MCNC: 4.2 G/DL (ref 3.5–5.1)
ALP SERPL-CCNC: 73 U/L (ref 38–126)
ALT SERPL W/O P-5'-P-CCNC: 13 U/L (ref 11–66)
ANION GAP SERPL CALC-SCNC: 10 MEQ/L (ref 8–16)
AST SERPL-CCNC: 14 U/L (ref 5–40)
BASOPHILS ABSOLUTE: 0 THOU/MM3 (ref 0–0.1)
BASOPHILS NFR BLD AUTO: 0.1 %
BILIRUB SERPL-MCNC: 0.3 MG/DL (ref 0.3–1.2)
BUN SERPL-MCNC: 13 MG/DL (ref 7–22)
CALCIUM SERPL-MCNC: 9.3 MG/DL (ref 8.5–10.5)
CHLORIDE SERPL-SCNC: 103 MEQ/L (ref 98–111)
CO2 SERPL-SCNC: 28 MEQ/L (ref 23–33)
CREAT SERPL-MCNC: 0.7 MG/DL (ref 0.4–1.2)
CRP SERPL-MCNC: 0.33 MG/DL (ref 0–1)
DEPRECATED RDW RBC AUTO: 42.2 FL (ref 35–45)
EOSINOPHIL NFR BLD AUTO: 1.7 %
EOSINOPHILS ABSOLUTE: 0.2 THOU/MM3 (ref 0–0.4)
ERYTHROCYTE [DISTWIDTH] IN BLOOD BY AUTOMATED COUNT: 12.1 % (ref 11.5–14.5)
ERYTHROCYTE [SEDIMENTATION RATE] IN BLOOD BY WESTERGREN METHOD: 40 MM/HR (ref 0–20)
GFR SERPL CREATININE-BSD FRML MDRD: > 60 ML/MIN/1.73M2
GLUCOSE SERPL-MCNC: 89 MG/DL (ref 70–108)
HCT VFR BLD AUTO: 41.3 % (ref 37–47)
HGB BLD-MCNC: 13.8 GM/DL (ref 12–16)
IMM GRANULOCYTES # BLD AUTO: 0.05 THOU/MM3 (ref 0–0.07)
IMM GRANULOCYTES NFR BLD AUTO: 0.5 %
LYMPHOCYTES ABSOLUTE: 4.4 THOU/MM3 (ref 1–4.8)
LYMPHOCYTES NFR BLD AUTO: 46.5 %
MCH RBC QN AUTO: 31.4 PG (ref 26–33)
MCHC RBC AUTO-ENTMCNC: 33.4 GM/DL (ref 32.2–35.5)
MCV RBC AUTO: 94.1 FL (ref 81–99)
MONOCYTES ABSOLUTE: 0.6 THOU/MM3 (ref 0.4–1.3)
MONOCYTES NFR BLD AUTO: 6 %
NEUTROPHILS NFR BLD AUTO: 45.2 %
NRBC BLD AUTO-RTO: 0 /100 WBC
PLATELET # BLD AUTO: 246 THOU/MM3 (ref 130–400)
PMV BLD AUTO: 11.3 FL (ref 9.4–12.4)
POTASSIUM SERPL-SCNC: 4.5 MEQ/L (ref 3.5–5.2)
PROT SERPL-MCNC: 6.7 G/DL (ref 6.1–8)
RBC # BLD AUTO: 4.39 MILL/MM3 (ref 4.2–5.4)
SEGMENTED NEUTROPHILS ABSOLUTE COUNT: 4.2 THOU/MM3 (ref 1.8–7.7)
SODIUM SERPL-SCNC: 141 MEQ/L (ref 135–145)
WBC # BLD AUTO: 9.4 THOU/MM3 (ref 4.8–10.8)

## 2023-11-01 PROCEDURE — 85025 COMPLETE CBC W/AUTO DIFF WBC: CPT

## 2023-11-01 PROCEDURE — 99214 OFFICE O/P EST MOD 30 MIN: CPT | Performed by: INTERNAL MEDICINE

## 2023-11-01 PROCEDURE — G8417 CALC BMI ABV UP PARAM F/U: HCPCS | Performed by: INTERNAL MEDICINE

## 2023-11-01 PROCEDURE — 86812 HLA TYPING A B OR C: CPT

## 2023-11-01 PROCEDURE — 72202 X-RAY EXAM SI JOINTS 3/> VWS: CPT

## 2023-11-01 PROCEDURE — 36415 COLL VENOUS BLD VENIPUNCTURE: CPT

## 2023-11-01 PROCEDURE — 80053 COMPREHEN METABOLIC PANEL: CPT

## 2023-11-01 PROCEDURE — G8484 FLU IMMUNIZE NO ADMIN: HCPCS | Performed by: INTERNAL MEDICINE

## 2023-11-01 PROCEDURE — 85651 RBC SED RATE NONAUTOMATED: CPT

## 2023-11-01 PROCEDURE — 4004F PT TOBACCO SCREEN RCVD TLK: CPT | Performed by: INTERNAL MEDICINE

## 2023-11-01 PROCEDURE — G8427 DOCREV CUR MEDS BY ELIG CLIN: HCPCS | Performed by: INTERNAL MEDICINE

## 2023-11-01 PROCEDURE — 86140 C-REACTIVE PROTEIN: CPT

## 2023-11-01 RX ORDER — CYCLOBENZAPRINE HCL 10 MG
10 TABLET ORAL 3 TIMES DAILY
COMMUNITY
Start: 2023-09-25

## 2023-11-01 RX ORDER — CELECOXIB 200 MG/1
200 CAPSULE ORAL DAILY
Qty: 30 CAPSULE | Refills: 0 | Status: SHIPPED | OUTPATIENT
Start: 2023-11-01

## 2023-11-01 RX ORDER — MELOXICAM 15 MG/1
TABLET ORAL
COMMUNITY
Start: 2022-12-27 | End: 2023-11-01

## 2023-11-01 ASSESSMENT — ENCOUNTER SYMPTOMS
SHORTNESS OF BREATH: 0
ABDOMINAL PAIN: 0
COUGH: 0
BLOOD IN STOOL: 0

## 2023-11-01 NOTE — PROGRESS NOTES
[address] [facility]   Rheumatology Clinic Note      11/1/2023         CHIEF COMPLAINT:    Chief Complaint   Patient presents with    1 Year Follow Up    Other     Pt has nerve damage on Lt side. Pain management provider would like to rule out ankylosing spondylitis. She has been doing OT/Pt and has also had multiple injections. Generalized stiffness in joints. Pt does have carpal tunnel bilateral wrist with Rt wrist worsened. Pain score 9. Shoulder Surgery on Lt            HISTORY OF PRESENT ILLNESS:    44 y.o. female presents for evaluation of back pain. She has been following with pain management and has been undergoing physical therapy and getting injections in the spine. Pain management would like for pt to be evaluated for ankylosing spondylitis. Most of her pain left shoulder and neck (between trap and clavicle) and lower back that radiates to right lower extremity. In April, she underwent left biceps tendon repair and arthroscopic \"cleaning of the joint\". She was had a sling for  8 weeks then underwent PT. Had ablation of thoracic spine  area in August, which helped with mid back pain. Reports having MRI that showed bulging disc and features of nerve damage in left arm. Also reports having nerve damage in lower back that results in chronic sciatica left lower leg. Has prolonged morning stiffness that lasts all day with cold weather. Hot bath eases her pain and stiffness. Meloxicam provides significant relief; however, is not able to take it regularly due to worsening GERD and stomach upset. Reports having decreased twisting range of her spine. RECAP:  Reports having retrolisthesis in lumbar spine. Spine fusion was recommended by Dr. Nakita Ruth. She is going through holistic treatment. She is getting dry needling. H/o stomach and esophageal ulcers. NSAIDs are contra-indicated. Has diffuse pain. Most pronounced in her hands. Reports having \"bumps in the knuckles\". Pain in the feet.

## 2023-11-04 LAB — HLA-B27 QL FC: NEGATIVE

## 2023-11-07 ENCOUNTER — HOSPITAL ENCOUNTER (OUTPATIENT)
Dept: PHYSICAL THERAPY | Age: 43
Setting detail: THERAPIES SERIES
Discharge: HOME | End: 2023-11-07
Payer: COMMERCIAL

## 2023-11-07 PROCEDURE — 97110 THERAPEUTIC EXERCISES: CPT

## 2023-11-07 PROCEDURE — 97140 MANUAL THERAPY 1/> REGIONS: CPT

## 2023-11-07 NOTE — PROGRESS NOTES
occipitals   x    Mobilization with movement Left UT / middle/anterior scalene   x    PROM Left shoulder ER/IR/Flex    Performed supine and sidelying - stabilizing scapula reduced pain during end range stretch     Total Manual  20 min  x    Exercise/Intervention   Notes   Chin Tuck  [id number]  x    Supine Cervical Retraction  [id number]  x    UT Stretch        Supine Pec Stretch (Major/Minor)  2x30 sec  x    Scalene Stretch  3x 20 sec      S/L Shoulder ER  2x10   Cueing to avoid compensatory mechanics    S/L shoulder abduction  2x10   1st set ROM limited by shoulder pain, improving with 2nd set    S/L shoulder horizontal abduction* 3x 30 sec   Therapist supporting scapular depression, felt good pull in Left pec   Seated Scapular retraction  2x10      Prone I, T, Y                      Objective / Goal assessment / Pt education          Interventions Next Treatment: Dry needling, Shoulder/Cervical mobility, shoulder girdle strength, manual prn for pain/mobility. Activity/Treatment Tolerance:  [x]  Patient tolerated treatment well  []  Patient limited by fatigue  []  Patient limited by pain   []  Patient limited by medical complications  []  Other:     Assessment: Held on dry needling this date, Pt noting that her friend had noticed that after her last two appointments she had increased low back pain for a couple of days. Discussed with Pt may be related to prone positioning, could trial with pillow under abdomen if wishing to proceed in the future. Completed manual techniques to aid in pain management, Pt noting some improvement in symptoms c completion. Re-eval next session, Pt planning to have cervical epidural next week. GOALS:  Patient Goal: Improve cervical/shoulder symptoms     Short Term Goals: 5 weeks  Pt to improve cervical/shoulder pain from 8/10 to <= 4/10 for improved activity tolerance. GOAL NOT MET: 8/10 .   Continue Goal  Long Term Goals: 8 weeks   Pt to demo cervical ROM WFL pain free for ease

## 2023-11-21 ENCOUNTER — APPOINTMENT (OUTPATIENT)
Dept: PHYSICAL THERAPY | Age: 43
End: 2023-11-21
Payer: COMMERCIAL

## 2023-11-21 ENCOUNTER — OFFICE VISIT (OUTPATIENT)
Dept: ORTHOPEDIC SURGERY | Age: 43
End: 2023-11-21
Payer: COMMERCIAL

## 2023-11-21 VITALS — WEIGHT: 204.59 LBS | HEIGHT: 60 IN | RESPIRATION RATE: 14 BRPM | BODY MASS INDEX: 40.17 KG/M2

## 2023-11-21 DIAGNOSIS — M54.2 NECK PAIN: ICD-10-CM

## 2023-11-21 DIAGNOSIS — M54.50 CHRONIC MIDLINE LOW BACK PAIN WITHOUT SCIATICA: Primary | ICD-10-CM

## 2023-11-21 DIAGNOSIS — G89.29 CHRONIC MIDLINE LOW BACK PAIN WITHOUT SCIATICA: Primary | ICD-10-CM

## 2023-11-21 DIAGNOSIS — M54.12 CERVICAL RADICULOPATHY: ICD-10-CM

## 2023-11-21 PROCEDURE — 99213 OFFICE O/P EST LOW 20 MIN: CPT | Performed by: ORTHOPAEDIC SURGERY

## 2023-11-21 PROCEDURE — 4004F PT TOBACCO SCREEN RCVD TLK: CPT | Performed by: ORTHOPAEDIC SURGERY

## 2023-11-21 PROCEDURE — G8484 FLU IMMUNIZE NO ADMIN: HCPCS | Performed by: ORTHOPAEDIC SURGERY

## 2023-11-21 PROCEDURE — G8417 CALC BMI ABV UP PARAM F/U: HCPCS | Performed by: ORTHOPAEDIC SURGERY

## 2023-11-21 PROCEDURE — G8427 DOCREV CUR MEDS BY ELIG CLIN: HCPCS | Performed by: ORTHOPAEDIC SURGERY

## 2023-11-21 NOTE — PROGRESS NOTES
Patient ID: eLxus Zamora is a 44 y.o. female    Chief Compliant:  Chief Complaint   Patient presents with    Lower Back Pain        Diagnostic imaging:  MRI cervical spine is reviewed patient has some moderate cervical spinal stenosis C5-6 report is reviewed which indicates more than I see as far as foraminal stenosis on the left      MRI report indicates a bulge at L5-S1 with bilateral pars defect    EMG is reviewed patient has some mild cubital tunnel on the left and right carpal tunnel on the right-EMG without evidence of radiculopathy      Assessment and Plan:  1. Chronic midline low back pain without sciatica    2. Cervical radiculopathy    3. Neck pain      Patient with dramatic pain involving entire left upper extremity around the breast to posterior shoulder as her most significant pain. Patient did have more typical pain down the posterior aspect of her shoulder and into her arm that has been relieved with a cervical epidural steroid injection that likely corresponds or potentially corresponds to the cervical foraminal stenosis    Patient also with low back pain. Patient with history of recent shoulder surgery with marginal outcome      Patient has completed two years of physical therapy, dry needling and LESI's with no improvement of pain. Patient reports she also has an elevated C-reactive protein is working potentially with rheumatology for another diagnosis and has bleeding esophageal varices and a hiatal hernia      Follow up 4 weeks    HPI:  This is a 44 y.o. female who presents to the clinic today for follow up of low back pain. Patient previously followed with Alana Kohler PA-C on 8/24/23. She reports pain in her lower back that has been ongoing for years. She has done two years of PT and dry needling with no relief. Patient reports that she was offered spinal surgery in 2009 but she did not undergo this procedure.      She reports that she was in an [address] in 2019 which

## 2023-11-27 ENCOUNTER — TELEPHONE (OUTPATIENT)
Dept: RHEUMATOLOGY | Age: 43
End: 2023-11-27

## 2023-11-27 ENCOUNTER — OFFICE VISIT (OUTPATIENT)
Dept: RHEUMATOLOGY | Age: 43
End: 2023-11-27
Payer: COMMERCIAL

## 2023-11-27 VITALS
SYSTOLIC BLOOD PRESSURE: 120 MMHG | BODY MASS INDEX: 40.17 KG/M2 | WEIGHT: 204.59 LBS | HEIGHT: 60 IN | OXYGEN SATURATION: 98 % | DIASTOLIC BLOOD PRESSURE: 68 MMHG | HEART RATE: 80 BPM

## 2023-11-27 DIAGNOSIS — F40.240 CLAUSTROPHOBIA: Primary | ICD-10-CM

## 2023-11-27 DIAGNOSIS — M46.1 BILATERAL SACROILIITIS (HCC): Primary | ICD-10-CM

## 2023-11-27 PROCEDURE — 4004F PT TOBACCO SCREEN RCVD TLK: CPT | Performed by: INTERNAL MEDICINE

## 2023-11-27 PROCEDURE — 99214 OFFICE O/P EST MOD 30 MIN: CPT | Performed by: INTERNAL MEDICINE

## 2023-11-27 PROCEDURE — G8417 CALC BMI ABV UP PARAM F/U: HCPCS | Performed by: INTERNAL MEDICINE

## 2023-11-27 PROCEDURE — G8428 CUR MEDS NOT DOCUMENT: HCPCS | Performed by: INTERNAL MEDICINE

## 2023-11-27 PROCEDURE — G8484 FLU IMMUNIZE NO ADMIN: HCPCS | Performed by: INTERNAL MEDICINE

## 2023-11-27 RX ORDER — LORAZEPAM 1 MG/1
1 TABLET ORAL ONCE
Qty: 1 TABLET | Refills: 0 | Status: SHIPPED | OUTPATIENT
Start: 2023-11-27 | End: 2023-11-27

## 2023-11-27 RX ORDER — SODIUM, POTASSIUM,MAG SULFATES 17.5-3.13G
SOLUTION, RECONSTITUTED, ORAL ORAL
COMMUNITY
Start: 2023-11-16

## 2023-11-27 RX ORDER — PANTOPRAZOLE SODIUM 40 MG/1
80 TABLET, DELAYED RELEASE ORAL
COMMUNITY
Start: 2023-11-13

## 2023-11-27 RX ORDER — FLUCONAZOLE 100 MG/1
TABLET ORAL
COMMUNITY
Start: 2023-11-16

## 2023-11-27 ASSESSMENT — ENCOUNTER SYMPTOMS
SHORTNESS OF BREATH: 0
ABDOMINAL PAIN: 0
BLOOD IN STOOL: 0
COUGH: 0

## 2023-11-27 NOTE — PROGRESS NOTES
[address] [facility]   Rheumatology Clinic Note      11/27/2023         CHIEF COMPLAINT:    Chief Complaint   Patient presents with    2 Week Follow-Up     Inflammatory back pain           HISTORY OF PRESENT ILLNESS:    44 y.o. female presents for evaluation of back pain. She has been following with pain management and has been undergoing physical therapy and getting injections in the spine. Pain management would like for pt to be evaluated for ankylosing spondylitis. In last office visit, labs and imaging were ordered. Celebrex was prescribed. Did not tolerate Celebrex. Was vomiting blood by next day. Was instructed by her GI specialist to stop the medication. She underwent EGD. Awaiting results. RECAP:  Most of her pain left shoulder and neck (between trap and clavicle) and lower back that radiates to right lower extremity. In April, she underwent left biceps tendon repair and arthroscopic \"cleaning of the joint\". She was had a sling for  8 weeks then underwent PT. Had ablation of thoracic spine  area in August, which helped with mid back pain. Reports having MRI that showed bulging disc and features of nerve damage in left arm. Also reports having nerve damage in lower back that results in chronic sciatica left lower leg. Has prolonged morning stiffness that lasts all day with cold weather. Hot bath eases her pain and stiffness. Meloxicam provides significant relief; however, is not able to take it regularly due to worsening GERD and stomach upset. Reports having decreased twisting range of her spine. RECAP:  Reports having retrolisthesis in lumbar spine. Spine fusion was recommended by Dr. Robyn Bowman. She is going through holistic treatment. She is getting dry needling. H/o stomach and esophageal ulcers. NSAIDs are contra-indicated. Has diffuse pain. Most pronounced in her hands. Reports having \"bumps in the knuckles\". Pain in the feet. Pain in the low back and neck.   Has

## 2023-11-27 NOTE — TELEPHONE ENCOUNTER
Patient is requesting something to take during her MRI because she is claustrophobic. Please advise . Thank you!

## 2023-11-27 NOTE — PROGRESS NOTES
[facility]  Therapy Contact Note      Date: 2023  Patient Name: Susan Wright        MRN: [id number]    Account Number: [de-identified]  : 1980  (44 y.o.)  Gender: female         Set up transportation with [address] for [phone].  time is 30 minutes before listed appointment time. Attempted to notify patient. Voicemail not set up.     Akira Soler, Rehab Tech

## 2023-11-29 ENCOUNTER — HOSPITAL ENCOUNTER (OUTPATIENT)
Dept: PHYSICAL THERAPY | Age: 43
Setting detail: THERAPIES SERIES
Discharge: HOME | End: 2023-11-29
Payer: COMMERCIAL

## 2023-11-29 ENCOUNTER — TELEPHONE (OUTPATIENT)
Dept: RHEUMATOLOGY | Age: 43
End: 2023-11-29

## 2023-11-29 PROCEDURE — 97110 THERAPEUTIC EXERCISES: CPT

## 2023-11-29 NOTE — TELEPHONE ENCOUNTER
Pt is calling and is in need of the office to set up transportation for her through [address] for her MRI on 12/20/2023 and she needs to be at the hospital by 9:00 am.  Please advise pt at [phone]

## 2023-12-12 ENCOUNTER — OFFICE VISIT (OUTPATIENT)
Dept: ORTHOPEDIC SURGERY | Age: 43
End: 2023-12-12
Payer: COMMERCIAL

## 2023-12-12 VITALS — HEIGHT: 60 IN | WEIGHT: 204 LBS | BODY MASS INDEX: 40.05 KG/M2 | RESPIRATION RATE: 14 BRPM

## 2023-12-12 DIAGNOSIS — M54.50 CHRONIC MIDLINE LOW BACK PAIN WITHOUT SCIATICA: ICD-10-CM

## 2023-12-12 DIAGNOSIS — M54.50 LOW BACK PAIN, UNSPECIFIED BACK PAIN LATERALITY, UNSPECIFIED CHRONICITY, UNSPECIFIED WHETHER SCIATICA PRESENT: Primary | ICD-10-CM

## 2023-12-12 DIAGNOSIS — G89.29 CHRONIC MIDLINE LOW BACK PAIN WITHOUT SCIATICA: ICD-10-CM

## 2023-12-12 DIAGNOSIS — M54.12 CERVICAL RADICULOPATHY: ICD-10-CM

## 2023-12-12 DIAGNOSIS — M54.2 NECK PAIN: ICD-10-CM

## 2023-12-12 PROCEDURE — G8427 DOCREV CUR MEDS BY ELIG CLIN: HCPCS | Performed by: ORTHOPAEDIC SURGERY

## 2023-12-12 PROCEDURE — G8484 FLU IMMUNIZE NO ADMIN: HCPCS | Performed by: ORTHOPAEDIC SURGERY

## 2023-12-12 PROCEDURE — 4004F PT TOBACCO SCREEN RCVD TLK: CPT | Performed by: ORTHOPAEDIC SURGERY

## 2023-12-12 PROCEDURE — 99213 OFFICE O/P EST LOW 20 MIN: CPT | Performed by: ORTHOPAEDIC SURGERY

## 2023-12-12 PROCEDURE — G8417 CALC BMI ABV UP PARAM F/U: HCPCS | Performed by: ORTHOPAEDIC SURGERY

## 2023-12-12 NOTE — PROGRESS NOTES
Patient ID: Jovita Gauthier is a 44 y.o. female    Chief Compliant:  Chief Complaint   Patient presents with    Lower Back Pain     Left sided low back pain         Diagnostic imaging:  MRI cervical spine is reviewed patient with some minor disc bulge at C 5 6 with some foraminal stenosis    MRI lumbar spine is reviewed I do not see the pars defect noted by radiology she does have some minor left L5-S1 disc bulge to the left and some minor L4-5 disc bulging as well      Assessment and Plan:  1. Low back pain, unspecified back pain laterality, unspecified chronicity, unspecified whether sciatica present    2. Chronic midline low back pain without sciatica    3. Cervical radiculopathy    4. Neck pain      At this point I do not see severe enough stenosis to warrant surgical intervention        Follow up prn    HPI:  This is a 44 y.o. female who presents to the clinic today for follow up of low back pain. Patient complains of left sided low back pain which is aggravated when sitting. She notes that she is not able to sit for more than 10 minutes. She has recently seen pain management who told her that they are not able to do any more SANDIE's due to a bleeding risk. She has completed PT with no improvement. Patient reports that she recently had a trigger point injection in her left shoulder which resolved the numbness and tingling at this time. Since last visit she saw a rheumatologist who ordered an MRI of her sacrum and hips and is scheduled for 12/20/23. She reports experiencing a popping sensation in her SI joints when she walks. Review of Systems   All other systems reviewed and are negative.       Past History:    Current Outpatient Medications:     fluconazole (DIFLUCAN) 100 MG tablet, take 1 tablet by mouth once daily for 2 weeks, Disp: , Rfl:     pantoprazole (PROTONIX) 40 MG tablet, Take 2 tablets by mouth 2 times daily (before meals), Disp: , Rfl:     sodium-potassium-mag sulfate (SUPREP)

## 2023-12-27 ENCOUNTER — APPOINTMENT (OUTPATIENT)
Dept: CT IMAGING | Age: 43
End: 2023-12-27
Payer: COMMERCIAL

## 2023-12-27 ENCOUNTER — HOSPITAL ENCOUNTER (EMERGENCY)
Age: 43
Discharge: HOME | End: 2023-12-27
Attending: STUDENT IN AN ORGANIZED HEALTH CARE EDUCATION/TRAINING PROGRAM
Payer: COMMERCIAL

## 2023-12-27 ENCOUNTER — APPOINTMENT (OUTPATIENT)
Dept: GENERAL RADIOLOGY | Age: 43
End: 2023-12-27
Payer: COMMERCIAL

## 2023-12-27 VITALS
SYSTOLIC BLOOD PRESSURE: 147 MMHG | TEMPERATURE: 98.3 F | HEART RATE: 78 BPM | DIASTOLIC BLOOD PRESSURE: 93 MMHG | RESPIRATION RATE: 19 BRPM | OXYGEN SATURATION: 100 %

## 2023-12-27 DIAGNOSIS — R07.81 RIB PAIN ON LEFT SIDE: ICD-10-CM
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
## 2023-12-27 DIAGNOSIS — S09.90XA CLOSED HEAD INJURY, INITIAL ENCOUNTER: ICD-10-CM

## 2023-12-27 DIAGNOSIS — S10.93XA CONTUSION OF NECK, INITIAL ENCOUNTER: ICD-10-CM

## 2023-12-27 DIAGNOSIS — S00.10XA: ICD-10-CM

## 2023-12-27 LAB
ANION GAP SERPL CALC-SCNC: 14 MEQ/L (ref 8–16)
BASOPHILS ABSOLUTE: 0 THOU/MM3 (ref 0–0.1)
BASOPHILS NFR BLD AUTO: 0.3 %
BUN SERPL-MCNC: 12 MG/DL (ref 7–22)
CALCIUM SERPL-MCNC: 9.1 MG/DL (ref 8.5–10.5)
CHLORIDE SERPL-SCNC: 102 MEQ/L (ref 98–111)
CO2 SERPL-SCNC: 23 MEQ/L (ref 23–33)
CREAT SERPL-MCNC: 0.8 MG/DL (ref 0.4–1.2)
DEPRECATED RDW RBC AUTO: 43.7 FL (ref 35–45)
EOSINOPHIL NFR BLD AUTO: 0.3 %
EOSINOPHILS ABSOLUTE: 0 THOU/MM3 (ref 0–0.4)
ERYTHROCYTE [DISTWIDTH] IN BLOOD BY AUTOMATED COUNT: 12.9 % (ref 11.5–14.5)
GFR SERPL CREATININE-BSD FRML MDRD: > 60 ML/MIN/1.73M2
GLUCOSE SERPL-MCNC: 102 MG/DL (ref 70–108)
HCT VFR BLD AUTO: 41.9 % (ref 37–47)
HGB BLD-MCNC: 13.8 GM/DL (ref 12–16)
IMM GRANULOCYTES # BLD AUTO: 0.02 THOU/MM3 (ref 0–0.07)
IMM GRANULOCYTES NFR BLD AUTO: 0.6 %
LYMPHOCYTES ABSOLUTE: 2 THOU/MM3 (ref 1–4.8)
LYMPHOCYTES NFR BLD AUTO: 58.2 %
MCH RBC QN AUTO: 30.6 PG (ref 26–33)
MCHC RBC AUTO-ENTMCNC: 32.9 GM/DL (ref 32.2–35.5)
MCV RBC AUTO: 92.9 FL (ref 81–99)
MONOCYTES ABSOLUTE: 0.3 THOU/MM3 (ref 0.4–1.3)
MONOCYTES NFR BLD AUTO: 9.1 %
NEUTROPHILS NFR BLD AUTO: 31.5 %
NRBC BLD AUTO-RTO: 0 /100 WBC
OSMOLALITY SERPL CALC.SUM OF ELEC: 277.5 MOSMOL/KG (ref 275–300)
PLATELET # BLD AUTO: 176 THOU/MM3 (ref 130–400)
PMV BLD AUTO: 10.9 FL (ref 9.4–12.4)
POTASSIUM SERPL-SCNC: 3.6 MEQ/L (ref 3.5–5.2)
RBC # BLD AUTO: 4.51 MILL/MM3 (ref 4.2–5.4)
SEGMENTED NEUTROPHILS ABSOLUTE COUNT: 1.1 THOU/MM3 (ref 1.8–7.7)
SODIUM SERPL-SCNC: 139 MEQ/L (ref 135–145)
WBC # BLD AUTO: 3.4 THOU/MM3 (ref 4.8–10.8)

## 2023-12-27 PROCEDURE — 70450 CT HEAD/BRAIN W/O DYE: CPT

## 2023-12-27 PROCEDURE — 6360000002 HC RX W HCPCS: Performed by: STUDENT IN AN ORGANIZED HEALTH CARE EDUCATION/TRAINING PROGRAM

## 2023-12-27 PROCEDURE — 36415 COLL VENOUS BLD VENIPUNCTURE: CPT

## 2023-12-27 PROCEDURE — 80048 BASIC METABOLIC PNL TOTAL CA: CPT

## 2023-12-27 PROCEDURE — 71101 X-RAY EXAM UNILAT RIBS/CHEST: CPT

## 2023-12-27 PROCEDURE — 76376 3D RENDER W/INTRP POSTPROCES: CPT

## 2023-12-27 PROCEDURE — 85025 COMPLETE CBC W/AUTO DIFF WBC: CPT

## 2023-12-27 PROCEDURE — 6370000000 HC RX 637 (ALT 250 FOR IP): Performed by: STUDENT IN AN ORGANIZED HEALTH CARE EDUCATION/TRAINING PROGRAM

## 2023-12-27 PROCEDURE — 70486 CT MAXILLOFACIAL W/O DYE: CPT

## 2023-12-27 PROCEDURE — 99285 EMERGENCY DEPT VISIT HI MDM: CPT

## 2023-12-27 PROCEDURE — 6360000004 HC RX CONTRAST MEDICATION: Performed by: STUDENT IN AN ORGANIZED HEALTH CARE EDUCATION/TRAINING PROGRAM

## 2023-12-27 PROCEDURE — 70496 CT ANGIOGRAPHY HEAD: CPT

## 2023-12-27 PROCEDURE — 70498 CT ANGIOGRAPHY NECK: CPT

## 2023-12-27 RX ORDER — ACETAMINOPHEN 500 MG
1000 TABLET ORAL ONCE
Status: COMPLETED | OUTPATIENT
Start: 2023-12-27 | End: 2023-12-27

## 2023-12-27 RX ORDER — ORPHENADRINE CITRATE 30 MG/ML
60 INJECTION INTRAMUSCULAR; INTRAVENOUS ONCE
Status: COMPLETED | OUTPATIENT
Start: 2023-12-27 | End: 2023-12-27

## 2023-12-27 RX ORDER — ORPHENADRINE CITRATE 100 MG/1
100 TABLET, EXTENDED RELEASE ORAL 2 TIMES DAILY
Qty: 20 TABLET | Refills: 0 | Status: SHIPPED | OUTPATIENT
Start: 2023-12-27 | End: 2024-01-06

## 2023-12-27 RX ADMIN — IOPAMIDOL 80 ML: 755 INJECTION, SOLUTION INTRAVENOUS at 19:44

## 2023-12-27 RX ADMIN — ORPHENADRINE CITRATE 60 MG: 30 INJECTION INTRAMUSCULAR; INTRAVENOUS at 21:41

## 2023-12-27 RX ADMIN — ACETAMINOPHEN 1000 MG: 500 TABLET ORAL at 19:24
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
## 2024-01-10 ENCOUNTER — OFFICE VISIT (OUTPATIENT)
Dept: RHEUMATOLOGY | Age: 44
End: 2024-01-10
Payer: COMMERCIAL

## 2024-01-10 ENCOUNTER — TELEPHONE (OUTPATIENT)
Dept: RHEUMATOLOGY | Age: 44
End: 2024-01-10

## 2024-01-10 ENCOUNTER — NURSE ONLY (OUTPATIENT)
Dept: LAB | Age: 44
End: 2024-01-10

## 2024-01-10 VITALS
BODY MASS INDEX: 37.32 KG/M2 | SYSTOLIC BLOOD PRESSURE: 132 MMHG | HEART RATE: 72 BPM | HEIGHT: 62 IN | OXYGEN SATURATION: 96 % | DIASTOLIC BLOOD PRESSURE: 76 MMHG | WEIGHT: 202.8 LBS

## 2024-01-10 DIAGNOSIS — D72.819 LEUKOPENIA, UNSPECIFIED TYPE: ICD-10-CM

## 2024-01-10 DIAGNOSIS — D72.819 LEUKOPENIA, UNSPECIFIED TYPE: Primary | ICD-10-CM

## 2024-01-10 LAB
BASOPHILS ABSOLUTE: 0 THOU/MM3 (ref 0–0.1)
BASOPHILS NFR BLD AUTO: 0.2 %
DEPRECATED RDW RBC AUTO: 41.3 FL (ref 35–45)
EOSINOPHIL NFR BLD AUTO: 2.5 %
EOSINOPHILS ABSOLUTE: 0.1 THOU/MM3 (ref 0–0.4)
ERYTHROCYTE [DISTWIDTH] IN BLOOD BY AUTOMATED COUNT: 12.2 % (ref 11.5–14.5)
HCT VFR BLD AUTO: 42.1 % (ref 37–47)
HGB BLD-MCNC: 13.9 GM/DL (ref 12–16)
IMM GRANULOCYTES # BLD AUTO: 0.02 THOU/MM3 (ref 0–0.07)
IMM GRANULOCYTES NFR BLD AUTO: 0.4 %
LYMPHOCYTES ABSOLUTE: 3.2 THOU/MM3 (ref 1–4.8)
LYMPHOCYTES NFR BLD AUTO: 57.6 %
MCH RBC QN AUTO: 30.6 PG (ref 26–33)
MCHC RBC AUTO-ENTMCNC: 33 GM/DL (ref 32.2–35.5)
MCV RBC AUTO: 92.7 FL (ref 81–99)
MONOCYTES ABSOLUTE: 0.5 THOU/MM3 (ref 0.4–1.3)
MONOCYTES NFR BLD AUTO: 8.7 %
NEUTROPHILS NFR BLD AUTO: 30.6 %
NRBC BLD AUTO-RTO: 0 /100 WBC
PLATELET # BLD AUTO: 218 THOU/MM3 (ref 130–400)
PMV BLD AUTO: 12 FL (ref 9.4–12.4)
RBC # BLD AUTO: 4.54 MILL/MM3 (ref 4.2–5.4)
SEGMENTED NEUTROPHILS ABSOLUTE COUNT: 1.7 THOU/MM3 (ref 1.8–7.7)
WBC # BLD AUTO: 5.6 THOU/MM3 (ref 4.8–10.8)

## 2024-01-10 PROCEDURE — G8427 DOCREV CUR MEDS BY ELIG CLIN: HCPCS | Performed by: INTERNAL MEDICINE

## 2024-01-10 PROCEDURE — G8484 FLU IMMUNIZE NO ADMIN: HCPCS | Performed by: INTERNAL MEDICINE

## 2024-01-10 PROCEDURE — 4004F PT TOBACCO SCREEN RCVD TLK: CPT | Performed by: INTERNAL MEDICINE

## 2024-01-10 PROCEDURE — G8417 CALC BMI ABV UP PARAM F/U: HCPCS | Performed by: INTERNAL MEDICINE

## 2024-01-10 PROCEDURE — 99214 OFFICE O/P EST MOD 30 MIN: CPT | Performed by: INTERNAL MEDICINE

## 2024-01-10 RX ORDER — ALPRAZOLAM 0.25 MG/1
TABLET ORAL
COMMUNITY
Start: 2024-01-09

## 2024-01-10 RX ORDER — PREGABALIN 50 MG/1
CAPSULE ORAL
COMMUNITY
Start: 2023-12-14

## 2024-01-10 RX ORDER — LIDOCAINE 50 MG/G
PATCH TOPICAL
COMMUNITY
Start: 2023-12-21

## 2024-01-10 ASSESSMENT — ENCOUNTER SYMPTOMS
ABDOMINAL PAIN: 0
COUGH: 0
SHORTNESS OF BREATH: 0

## 2024-01-10 NOTE — TELEPHONE ENCOUNTER
Patient called requesting results from CBC that was drawn earlier today. Results available in chart for review. Patient expressed concerns if she needs to be referred to hematology she would like to know by today or tomorrow morning as she is scheduled for disability appointment tomorrow that she would like to provide an update for. Please advise. Thank you.
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
## 2024-01-10 NOTE — TELEPHONE ENCOUNTER
Please inform pt that CBC came back normal. Her WBC is within normal range. No need for referral to hematology service at this time.

## 2024-01-11 NOTE — TELEPHONE ENCOUNTER
Please inform pt that a referral to a hematologist is not appropriate at this time.  With regards of the fungal infection in her esophagus, as we discussed in clinic, she needs to f/u with gastroenterology.

## 2024-01-21 ENCOUNTER — HOSPITAL ENCOUNTER (EMERGENCY)
Age: 44
Discharge: HOME | End: 2024-01-21
Payer: COMMERCIAL

## 2024-01-21 ENCOUNTER — APPOINTMENT (OUTPATIENT)
Dept: GENERAL RADIOLOGY | Age: 44
End: 2024-01-21
Payer: COMMERCIAL

## 2024-01-21 VITALS
DIASTOLIC BLOOD PRESSURE: 97 MMHG | TEMPERATURE: 97.5 F | BODY MASS INDEX: 37.3 KG/M2 | HEIGHT: 60 IN | WEIGHT: 190 LBS | OXYGEN SATURATION: 98 % | HEART RATE: 71 BPM | RESPIRATION RATE: 16 BRPM | SYSTOLIC BLOOD PRESSURE: 135 MMHG

## 2024-01-21 DIAGNOSIS — J18.9 PNEUMONIA OF LEFT LOWER LOBE DUE TO INFECTIOUS ORGANISM: Primary | ICD-10-CM

## 2024-01-21 PROCEDURE — 99213 OFFICE O/P EST LOW 20 MIN: CPT | Performed by: EMERGENCY MEDICINE

## 2024-01-21 PROCEDURE — 71046 X-RAY EXAM CHEST 2 VIEWS: CPT

## 2024-01-21 PROCEDURE — 99213 OFFICE O/P EST LOW 20 MIN: CPT

## 2024-01-21 RX ORDER — PREDNISONE 20 MG/1
20 TABLET ORAL 2 TIMES DAILY
Qty: 10 TABLET | Refills: 0 | Status: SHIPPED | OUTPATIENT
Start: 2024-01-21 | End: 2024-01-26

## 2024-01-21 RX ORDER — BENZONATATE 200 MG/1
200 CAPSULE ORAL 3 TIMES DAILY PRN
Qty: 30 CAPSULE | Refills: 0 | Status: SHIPPED | OUTPATIENT
Start: 2024-01-21 | End: 2024-01-28

## 2024-01-21 RX ORDER — AZITHROMYCIN 250 MG/1
TABLET, FILM COATED ORAL
Qty: 1 PACKET | Refills: 0 | Status: SHIPPED | OUTPATIENT
Start: 2024-01-21 | End: 2024-01-25

## 2024-01-21 NOTE — ED PROVIDER NOTES
[facility] URGENT CARE  Urgent Care Encounter       CHIEF COMPLAINT       Chief Complaint   Patient presents with    Cough    Nasal Congestion    Back Pain       Nurses Notes reviewed and I agree except as noted in the HPI.  HISTORY OF PRESENT ILLNESS   Chelsey Juarez is a 43 y.o. female who presents for complaints of cough, chest congestion.  Symptoms have been progressing for several days.  The patient is currently on an antibiotic prescribed by primary care provider.  States she is on day 4 or 5 of cefdinir.  She is prescribed a 10-day course of this antibiotic.  Patient reports that she has had pneumonia when she was 16 years old and this feels similar.  She states her cough is so frequent that it is irritating her chronic back issues.  No fever.  States the cough is productive at times.    HPI    REVIEW OF SYSTEMS     Review of Systems   Constitutional:  Positive for activity change and fatigue.   HENT:  Positive for congestion, rhinorrhea and sinus pressure.    Respiratory:  Positive for cough and chest tightness. Negative for shortness of breath.    Cardiovascular:  Positive for chest pain (with cough).   Neurological:  Negative for dizziness and headaches.       PAST MEDICAL HISTORY         Diagnosis Date    Back pain     chronic    Cystitis, interstitial     Esophageal ulcer     Headache(784.0)     Hyperlipidemia     Insulin resistance     Irritable bowel syndrome     Spondylolisthesis     Spondylolysis        SURGICALHISTORY     Patient  has a past surgical history that includes cyst removal (01/01/2011); Hysterectomy (2010); Abdominal adhesion surgery (01/01/2012); Colonoscopy (2005, 2011); Tonsillectomy and adenoidectomy (01/01/1987); [place] tooth extraction (01/01/1996); Ovary removal (2010); and Shoulder arthroscopy (Left, 4/17/2023).    CURRENT MEDICATIONS       Previous Medications    ALBUTEROL (PROVENTIL) (2.5 MG/3ML) 0.083% NEBULIZER SOLUTION    Take 3 mLs by nebulization every 6  Normocephalic.      Nose: Congestion and rhinorrhea present.      Mouth/Throat:      Mouth: Mucous membranes are moist.   Cardiovascular:      Rate and Rhythm: Normal rate and regular rhythm.      Pulses: Normal pulses.      Heart sounds: Normal heart sounds.   Pulmonary:      Effort: Pulmonary effort is normal. No respiratory distress.      Breath sounds: Normal breath sounds. No wheezing or rhonchi.   Musculoskeletal:      Cervical back: Normal range of motion.   Skin:     General: Skin is warm and dry.      Capillary Refill: Capillary refill takes less than 2 seconds.   Neurological:      General: No focal deficit present.      Mental Status: She is alert.   Psychiatric:         Mood and Affect: Mood normal.         DIAGNOSTIC RESULTS     Labs:No results found for this visit on 01/21/24.    IMAGING:    XR CHEST (2 VW)   Final Result   Left lower lobe infiltrate consistent with pneumonia.               **This report has been created using voice recognition software. It may contain minor errors which are inherent in voice recognition technology.**      Final report electronically signed by Dr. Soniya Means on 1/21/2024 1:30 PM            EKG:      URGENT CARE COURSE:     Vitals:    01/21/24 1254   BP: (!) 135/97   Pulse: 71   Resp: 16   Temp: 97.5 °F (36.4 °C)   SpO2: 98%   Weight: 86.2 kg (190 lb)   Height: 1.524 m (5')       Medications - No data to display         PROCEDURES:  None    FINAL IMPRESSION      1. Pneumonia of left lower lobe due to infectious organism          DISPOSITION/ PLAN     Patient presents for left lower lobe pneumonia.  Patient is already on cefdinir.  Will also place on Zithromax additionally.  Prednisone and Tessalon to help with her symptoms.  Advised to drink plenty of fluids.  Follow-up primary care provider if no significant proved in 2 to 3 days.  Return sooner for new or worsening symptoms      PATIENT REFERRED TO:  Ashwin Cruz MD  [address] [facility] [address] / [facility]

## 2024-01-21 NOTE — ED NOTES
Pt complains she has had a cough for one month states she is currently on cefdinir for pneumonia but she doesn't think it is enough.  Would like a chest xray.     Gretta Barr, RN  01/21/24 7178

## 2024-01-21 NOTE — DISCHARGE INSTR - COC
Continuity of Care Form    Patient Name: Chelsey Juarez   :  1980  MRN:  [id number]    Admit date:  2024  Discharge date:  ***    Code Status Order: No Order   Advance Directives:     Admitting Physician:  No admitting provider for patient encounter.  PCP: Ashwin Cruz MD    Discharging Nurse: ***  Discharging Hospital Unit/Room#:   Discharging Unit Phone Number: ***    Emergency Contact:   Extended Emergency Contact Information  Primary Emergency Contact: GERHARD THOMASON  Mobile Phone: [phone]  Relation: Brother/Sister    Past Surgical History:  Past Surgical History:   Procedure Laterality Date    ABDOMINAL ADHESION SURGERY  2012    Dr. Guardado    COLONOSCOPY  ,     Dr. Brooks    CYST REMOVAL  2011    right ankle-Dr. Hernandez    HYSTERECTOMY (CERVIX STATUS UNKNOWN)      Dr. Guardado    OVARY REMOVAL      SHOULDER ARTHROSCOPY Left 2023    LEFT SHOULDER ARTHROSCOPY, BICEP TENODESIS, DEBRIDEMENT, DISTAL CLAVICLE EXCISION performed by Maikol Joshi DO at [facility] OR    TONSILLECTOMY AND ADENOIDECTOMY  1987    WISDOM TOOTH EXTRACTION  1996       Immunization History:     There is no immunization history on file for this patient.    Active Problems:  Patient Active Problem List   Diagnosis Code    Metabolic syndrome E88.810    Insulin resistance E88.819    Papilledema H47.10    Weight gain     Migraine G43.909    Abdominal pain R10.9    PCO (polycystic ovaries) E28.2    Abdominal adhesions K66.0    Thrombosed hemorrhoids K64.5       Isolation/Infection:   Isolation            No Isolation          Patient Infection Status       None to display                     Nurse Assessment:  Last Vital Signs: BP (!) 135/97   Pulse 71   Temp 97.5 °F (36.4 °C)   Resp 16   Ht 1.524 m (5')   Wt 86.2 kg (190 lb)   SpO2 98%   BMI 37.11 kg/m²     Last documented pain score (0-10 scale):    Last Weight:   Wt Readings from Last 1 Encounters:   24 86.2 kg (190

## 2024-01-21 NOTE — DISCHARGE INSTRUCTIONS
Drink plenty of water    Prednisone and Tessalon as directed    Zithromax as directed    Continue cefdinir    Use your albuterol as directed at home    Follow-up family physician return if no significant improvement in 3 to 4 days.  Return sooner for new or worsening symptoms

## 2024-01-28 ENCOUNTER — HOSPITAL ENCOUNTER (EMERGENCY)
Age: 44
Discharge: HOME | End: 2024-01-28
Attending: EMERGENCY MEDICINE
Payer: COMMERCIAL

## 2024-01-28 ENCOUNTER — APPOINTMENT (OUTPATIENT)
Dept: GENERAL RADIOLOGY | Age: 44
End: 2024-01-28
Payer: COMMERCIAL

## 2024-01-28 VITALS
TEMPERATURE: 98.2 F | SYSTOLIC BLOOD PRESSURE: 126 MMHG | HEART RATE: 97 BPM | RESPIRATION RATE: 17 BRPM | OXYGEN SATURATION: 94 % | DIASTOLIC BLOOD PRESSURE: 81 MMHG

## 2024-01-28 DIAGNOSIS — M79.601 RIGHT ARM PAIN: ICD-10-CM

## 2024-01-28 DIAGNOSIS — M54.9 OTHER CHRONIC BACK PAIN: Primary | ICD-10-CM

## 2024-01-28 DIAGNOSIS — G89.29 OTHER CHRONIC BACK PAIN: Primary | ICD-10-CM

## 2024-01-28 LAB
ANION GAP SERPL CALC-SCNC: 10 MEQ/L (ref 8–16)
BASOPHILS ABSOLUTE: 0 THOU/MM3 (ref 0–0.1)
BASOPHILS NFR BLD AUTO: 0.2 %
BUN SERPL-MCNC: 15 MG/DL (ref 7–22)
CALCIUM SERPL-MCNC: 9.6 MG/DL (ref 8.5–10.5)
CHLORIDE SERPL-SCNC: 100 MEQ/L (ref 98–111)
CO2 SERPL-SCNC: 28 MEQ/L (ref 23–33)
CREAT SERPL-MCNC: 0.7 MG/DL (ref 0.4–1.2)
DEPRECATED RDW RBC AUTO: 40.4 FL (ref 35–45)
EOSINOPHIL NFR BLD AUTO: 1.3 %
EOSINOPHILS ABSOLUTE: 0.1 THOU/MM3 (ref 0–0.4)
ERYTHROCYTE [DISTWIDTH] IN BLOOD BY AUTOMATED COUNT: 12.8 % (ref 11.5–14.5)
GFR SERPL CREATININE-BSD FRML MDRD: > 60 ML/MIN/1.73M2
GLUCOSE SERPL-MCNC: 102 MG/DL (ref 70–108)
HCT VFR BLD AUTO: 40.7 % (ref 37–47)
HGB BLD-MCNC: 14 GM/DL (ref 12–16)
IMM GRANULOCYTES # BLD AUTO: 0.1 THOU/MM3 (ref 0–0.07)
IMM GRANULOCYTES NFR BLD AUTO: 0.9 %
LYMPHOCYTES ABSOLUTE: 4.2 THOU/MM3 (ref 1–4.8)
LYMPHOCYTES NFR BLD AUTO: 39.7 %
MCH RBC QN AUTO: 30.4 PG (ref 26–33)
MCHC RBC AUTO-ENTMCNC: 34.4 GM/DL (ref 32.2–35.5)
MCV RBC AUTO: 88.5 FL (ref 81–99)
MONOCYTES ABSOLUTE: 0.5 THOU/MM3 (ref 0.4–1.3)
MONOCYTES NFR BLD AUTO: 5 %
NEUTROPHILS NFR BLD AUTO: 52.9 %
NRBC BLD AUTO-RTO: 0 /100 WBC
OSMOLALITY SERPL CALC.SUM OF ELEC: 276.7 MOSMOL/KG (ref 275–300)
PLATELET # BLD AUTO: 370 THOU/MM3 (ref 130–400)
PMV BLD AUTO: 9.8 FL (ref 9.4–12.4)
POTASSIUM SERPL-SCNC: 4.2 MEQ/L (ref 3.5–5.2)
RBC # BLD AUTO: 4.6 MILL/MM3 (ref 4.2–5.4)
SCAN OF BLOOD SMEAR: NORMAL
SEGMENTED NEUTROPHILS ABSOLUTE COUNT: 5.7 THOU/MM3 (ref 1.8–7.7)
SODIUM SERPL-SCNC: 138 MEQ/L (ref 135–145)
VARIANT LYMPHS BLD QL SMEAR: ABNORMAL %
WBC # BLD AUTO: 10.7 THOU/MM3 (ref 4.8–10.8)

## 2024-01-28 PROCEDURE — 80048 BASIC METABOLIC PNL TOTAL CA: CPT

## 2024-01-28 PROCEDURE — 85025 COMPLETE CBC W/AUTO DIFF WBC: CPT

## 2024-01-28 PROCEDURE — 93010 ELECTROCARDIOGRAM REPORT: CPT | Performed by: INTERNAL MEDICINE

## 2024-01-28 PROCEDURE — 99285 EMERGENCY DEPT VISIT HI MDM: CPT

## 2024-01-28 PROCEDURE — 6370000000 HC RX 637 (ALT 250 FOR IP)

## 2024-01-28 PROCEDURE — 6370000000 HC RX 637 (ALT 250 FOR IP): Performed by: EMERGENCY MEDICINE

## 2024-01-28 PROCEDURE — 71101 X-RAY EXAM UNILAT RIBS/CHEST: CPT

## 2024-01-28 PROCEDURE — 36415 COLL VENOUS BLD VENIPUNCTURE: CPT

## 2024-01-28 PROCEDURE — 93005 ELECTROCARDIOGRAM TRACING: CPT

## 2024-01-28 RX ORDER — HYDROCODONE BITARTRATE AND ACETAMINOPHEN 5; 325 MG/1; MG/1
1 TABLET ORAL ONCE
Status: COMPLETED | OUTPATIENT
Start: 2024-01-28 | End: 2024-01-28

## 2024-01-28 RX ORDER — ACETAMINOPHEN 500 MG
1000 TABLET ORAL
Status: COMPLETED | OUTPATIENT
Start: 2024-01-28 | End: 2024-01-28

## 2024-01-28 RX ORDER — HYDROXYZINE HYDROCHLORIDE 10 MG/1
10 TABLET, FILM COATED ORAL ONCE
Status: COMPLETED | OUTPATIENT
Start: 2024-01-28 | End: 2024-01-28

## 2024-01-28 RX ORDER — ALBUTEROL SULFATE 90 UG/1
2 AEROSOL, METERED RESPIRATORY (INHALATION) EVERY 6 HOURS PRN
Status: DISCONTINUED | OUTPATIENT
Start: 2024-01-28 | End: 2024-01-28 | Stop reason: HOSPADM

## 2024-01-28 RX ORDER — BUDESONIDE 0.5 MG/2ML
1 INHALANT ORAL 2 TIMES DAILY
Qty: 60 EACH | Refills: 0 | Status: SHIPPED | OUTPATIENT
Start: 2024-01-28

## 2024-01-28 RX ADMIN — HYDROCODONE BITARTRATE AND ACETAMINOPHEN 1 TABLET: 5; 325 TABLET ORAL at 15:28

## 2024-01-28 RX ADMIN — ALBUTEROL SULFATE 2 PUFF: 90 AEROSOL, METERED RESPIRATORY (INHALATION) at 15:28

## 2024-01-28 RX ADMIN — ACETAMINOPHEN 1000 MG: 500 TABLET ORAL at 13:34

## 2024-01-28 RX ADMIN — HYDROXYZINE HYDROCHLORIDE 10 MG: 10 TABLET ORAL at 13:40

## 2024-01-28 ASSESSMENT — PAIN SCALES - GENERAL
PAINLEVEL_OUTOF10: 9
PAINLEVEL_OUTOF10: 8

## 2024-01-28 ASSESSMENT — PAIN - FUNCTIONAL ASSESSMENT: PAIN_FUNCTIONAL_ASSESSMENT: 0-10

## 2024-01-28 ASSESSMENT — PAIN DESCRIPTION - LOCATION
LOCATION: NECK;BACK
LOCATION: BACK

## 2024-01-28 NOTE — ED PROVIDER NOTES
Maikol Joshi, DO at [facility] OR    TONSILLECTOMY AND ADENOIDECTOMY  01/01/1987    WISDOM TOOTH EXTRACTION  01/01/1996         MEDICATIONS     Current Facility-Administered Medications:     acetaminophen (TYLENOL) tablet 1,000 mg, 1,000 mg, Oral, NOW, Marisol Teixeira MD    Current Outpatient Medications:     benzonatate (TESSALON) 200 MG capsule, Take 1 capsule by mouth 3 times daily as needed for Cough, Disp: 30 capsule, Rfl: 0    ALPRAZolam (XANAX) 0.25 MG tablet, , Disp: , Rfl:     lidocaine (LIDODERM) 5 %, apply 1 patch TO THE AFFECTED AREA DAILY. LEAVE ON FOR 12 HOURS AND THEN OFF FOR 12 HOURS., Disp: , Rfl:     pregabalin (LYRICA) 50 MG capsule, 1 cap(s) orally TAKE 50MG QPM FOR 3 DAYS, THEN TAKE 50MG QAM AND QPM FOR 3 DAYS, THEN TAKE 50MG QAM AND 100MG QOM THEREAFTER for 30 days, Disp: , Rfl:     fluconazole (DIFLUCAN) 100 MG tablet, take 1 tablet by mouth once daily for 2 weeks, Disp: , Rfl:     pantoprazole (PROTONIX) 40 MG tablet, Take 2 tablets by mouth 2 times daily (before meals), Disp: , Rfl:     sodium-potassium-mag sulfate (SUPREP) 17.5-3.13-1.6 GM/177ML SOLN solution, take as directed, Disp: , Rfl:     albuterol sulfate HFA (VENTOLIN HFA) 108 (90 Base) MCG/ACT inhaler, Inhale 2 puffs into the lungs every 6 hours as needed for Wheezing, Disp: , Rfl:     albuterol (PROVENTIL) (2.5 MG/3ML) 0.083% nebulizer solution, Take 3 mLs by nebulization every 6 hours as needed for Wheezing or Shortness of Breath, Disp: 120 each, Rfl: 0    Previous Medications    ALBUTEROL (PROVENTIL) (2.5 MG/3ML) 0.083% NEBULIZER SOLUTION    Take 3 mLs by nebulization every 6 hours as needed for Wheezing or Shortness of Breath    ALBUTEROL SULFATE HFA (VENTOLIN HFA) 108 (90 BASE) MCG/ACT INHALER    Inhale 2 puffs into the lungs every 6 hours as needed for Wheezing    ALPRAZOLAM (XANAX) 0.25 MG TABLET        BENZONATATE (TESSALON) 200 MG CAPSULE    Take 1 capsule by mouth 3 times daily as needed for Cough    FLUCONAZOLE (DIFLUCAN)
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
## 2024-01-28 NOTE — DISCHARGE INSTRUCTIONS
Follow-up with your primary care physician for further assessment and management of your symptoms.    Use warm compressors on your right arm pain.    Take Tylenol Motrin as needed for pain.    Use budesonide nebulizer as needed for wheezing    Return to the emergency department immediately if there is any new or concerning symptom.

## 2024-01-29 LAB
EKG ATRIAL RATE: 84 BPM
EKG P AXIS: 56 DEGREES
EKG P-R INTERVAL: 114 MS
EKG Q-T INTERVAL: 382 MS
EKG QRS DURATION: 82 MS
EKG QTC CALCULATION (BAZETT): 451 MS
EKG R AXIS: 77 DEGREES
EKG T AXIS: 75 DEGREES
EKG VENTRICULAR RATE: 84 BPM

## 2024-02-13 ENCOUNTER — HOSPITAL ENCOUNTER (OUTPATIENT)
Dept: GENERAL RADIOLOGY | Age: 44
Discharge: HOME | End: 2024-02-13
Payer: COMMERCIAL

## 2024-02-13 ENCOUNTER — HOSPITAL ENCOUNTER (OUTPATIENT)
Age: 44
Discharge: HOME | End: 2024-02-13
Payer: COMMERCIAL

## 2024-02-13 DIAGNOSIS — R05.9 COUGH, UNSPECIFIED TYPE: ICD-10-CM

## 2024-02-13 PROCEDURE — 71046 X-RAY EXAM CHEST 2 VIEWS: CPT

## 2024-03-15 ENCOUNTER — APPOINTMENT (OUTPATIENT)
Dept: GENERAL RADIOLOGY | Age: 44
End: 2024-03-15
Payer: COMMERCIAL

## 2024-03-15 ENCOUNTER — APPOINTMENT (OUTPATIENT)
Dept: CT IMAGING | Age: 44
End: 2024-03-15
Payer: COMMERCIAL

## 2024-03-15 ENCOUNTER — HOSPITAL ENCOUNTER (EMERGENCY)
Age: 44
Discharge: HOME | End: 2024-03-15
Attending: EMERGENCY MEDICINE
Payer: COMMERCIAL

## 2024-03-15 VITALS
HEART RATE: 67 BPM | TEMPERATURE: 98.1 F | SYSTOLIC BLOOD PRESSURE: 135 MMHG | RESPIRATION RATE: 12 BRPM | HEIGHT: 60 IN | OXYGEN SATURATION: 97 % | DIASTOLIC BLOOD PRESSURE: 98 MMHG | WEIGHT: 195 LBS | BODY MASS INDEX: 38.28 KG/M2

## 2024-03-15 DIAGNOSIS — U07.1 COVID-19: Primary | ICD-10-CM

## 2024-03-15 LAB
ALBUMIN SERPL BCG-MCNC: 4 G/DL (ref 3.5–5.1)
ALP SERPL-CCNC: 68 U/L (ref 38–126)
ALT SERPL W/O P-5'-P-CCNC: 15 U/L (ref 11–66)
ANION GAP SERPL CALC-SCNC: 14 MEQ/L (ref 8–16)
AST SERPL-CCNC: 15 U/L (ref 5–40)
B-HCG SERPL QL: NEGATIVE
BASOPHILS ABSOLUTE: 0 THOU/MM3 (ref 0–0.1)
BASOPHILS NFR BLD AUTO: 0.1 %
BILIRUB SERPL-MCNC: < 0.2 MG/DL (ref 0.3–1.2)
BILIRUB UR QL STRIP.AUTO: NEGATIVE
BUN SERPL-MCNC: 20 MG/DL (ref 7–22)
CALCIUM SERPL-MCNC: 9.6 MG/DL (ref 8.5–10.5)
CHARACTER UR: CLEAR
CHLORIDE SERPL-SCNC: 103 MEQ/L (ref 98–111)
CO2 SERPL-SCNC: 23 MEQ/L (ref 23–33)
COLOR: YELLOW
CREAT SERPL-MCNC: 0.8 MG/DL (ref 0.4–1.2)
DEPRECATED RDW RBC AUTO: 45.2 FL (ref 35–45)
EOSINOPHIL NFR BLD AUTO: 2.2 %
EOSINOPHILS ABSOLUTE: 0.2 THOU/MM3 (ref 0–0.4)
ERYTHROCYTE [DISTWIDTH] IN BLOOD BY AUTOMATED COUNT: 13.2 % (ref 11.5–14.5)
FLUAV RNA RESP QL NAA+PROBE: NOT DETECTED
FLUBV RNA RESP QL NAA+PROBE: NOT DETECTED
GFR SERPL CREATININE-BSD FRML MDRD: > 60 ML/MIN/1.73M2
GLUCOSE SERPL-MCNC: 112 MG/DL (ref 70–108)
GLUCOSE UR QL STRIP.AUTO: NEGATIVE MG/DL
HCT VFR BLD AUTO: 40.5 % (ref 37–47)
HGB BLD-MCNC: 13.7 GM/DL (ref 12–16)
HGB UR QL STRIP.AUTO: NEGATIVE
IMM GRANULOCYTES # BLD AUTO: 0.02 THOU/MM3 (ref 0–0.07)
IMM GRANULOCYTES NFR BLD AUTO: 0.3 %
KETONES UR QL STRIP.AUTO: NEGATIVE
LIPASE SERPL-CCNC: 47.8 U/L (ref 5.6–51.3)
LYMPHOCYTES ABSOLUTE: 3.6 THOU/MM3 (ref 1–4.8)
LYMPHOCYTES NFR BLD AUTO: 49.1 %
MAGNESIUM SERPL-MCNC: 1.9 MG/DL (ref 1.6–2.4)
MCH RBC QN AUTO: 31.3 PG (ref 26–33)
MCHC RBC AUTO-ENTMCNC: 33.8 GM/DL (ref 32.2–35.5)
MCV RBC AUTO: 92.5 FL (ref 81–99)
MONOCYTES ABSOLUTE: 0.4 THOU/MM3 (ref 0.4–1.3)
MONOCYTES NFR BLD AUTO: 5.3 %
NEUTROPHILS NFR BLD AUTO: 43 %
NITRITE UR QL STRIP: NEGATIVE
NRBC BLD AUTO-RTO: 0 /100 WBC
OSMOLALITY SERPL CALC.SUM OF ELEC: 282.8 MOSMOL/KG (ref 275–300)
PH UR STRIP.AUTO: 5.5 [PH] (ref 5–9)
PLATELET # BLD AUTO: 206 THOU/MM3 (ref 130–400)
PMV BLD AUTO: 11.3 FL (ref 9.4–12.4)
POTASSIUM SERPL-SCNC: 4.3 MEQ/L (ref 3.5–5.2)
PROT SERPL-MCNC: 6.7 G/DL (ref 6.1–8)
PROT UR STRIP.AUTO-MCNC: NEGATIVE MG/DL
RBC # BLD AUTO: 4.38 MILL/MM3 (ref 4.2–5.4)
SARS-COV-2 RNA RESP QL NAA+PROBE: DETECTED
SEGMENTED NEUTROPHILS ABSOLUTE COUNT: 3.1 THOU/MM3 (ref 1.8–7.7)
SODIUM SERPL-SCNC: 140 MEQ/L (ref 135–145)
SP GR UR REFRACT.AUTO: 1.02 (ref 1–1.03)
TROPONIN, HIGH SENSITIVITY: < 6 NG/L (ref 0–12)
UROBILINOGEN, URINE: 0.2 EU/DL (ref 0–1)
WBC # BLD AUTO: 7.3 THOU/MM3 (ref 4.8–10.8)
WBC #/AREA URNS HPF: NEGATIVE /[HPF]

## 2024-03-15 PROCEDURE — 80053 COMPREHEN METABOLIC PANEL: CPT

## 2024-03-15 PROCEDURE — 36415 COLL VENOUS BLD VENIPUNCTURE: CPT

## 2024-03-15 PROCEDURE — 99285 EMERGENCY DEPT VISIT HI MDM: CPT

## 2024-03-15 PROCEDURE — 71260 CT THORAX DX C+: CPT

## 2024-03-15 PROCEDURE — 74177 CT ABD & PELVIS W/CONTRAST: CPT

## 2024-03-15 PROCEDURE — 85025 COMPLETE CBC W/AUTO DIFF WBC: CPT

## 2024-03-15 PROCEDURE — 76376 3D RENDER W/INTRP POSTPROCES: CPT

## 2024-03-15 PROCEDURE — 6360000004 HC RX CONTRAST MEDICATION: Performed by: EMERGENCY MEDICINE

## 2024-03-15 PROCEDURE — 6370000000 HC RX 637 (ALT 250 FOR IP)

## 2024-03-15 PROCEDURE — 83690 ASSAY OF LIPASE: CPT

## 2024-03-15 PROCEDURE — 81003 URINALYSIS AUTO W/O SCOPE: CPT

## 2024-03-15 PROCEDURE — 93005 ELECTROCARDIOGRAM TRACING: CPT

## 2024-03-15 PROCEDURE — 84484 ASSAY OF TROPONIN QUANT: CPT

## 2024-03-15 PROCEDURE — 84703 CHORIONIC GONADOTROPIN ASSAY: CPT

## 2024-03-15 PROCEDURE — 83735 ASSAY OF MAGNESIUM: CPT

## 2024-03-15 PROCEDURE — 87636 SARSCOV2 & INF A&B AMP PRB: CPT

## 2024-03-15 PROCEDURE — 71045 X-RAY EXAM CHEST 1 VIEW: CPT

## 2024-03-15 RX ORDER — ONDANSETRON 4 MG/1
4 TABLET, ORALLY DISINTEGRATING ORAL 3 TIMES DAILY PRN
Qty: 21 TABLET | Refills: 0 | Status: SHIPPED | OUTPATIENT
Start: 2024-03-15

## 2024-03-15 RX ADMIN — IOPAMIDOL 80 ML: 755 INJECTION, SOLUTION INTRAVENOUS at 20:48

## 2024-03-15 RX ADMIN — Medication: at 21:01

## 2024-03-15 ASSESSMENT — PAIN DESCRIPTION - ORIENTATION: ORIENTATION: UPPER

## 2024-03-15 ASSESSMENT — PAIN - FUNCTIONAL ASSESSMENT: PAIN_FUNCTIONAL_ASSESSMENT: 0-10

## 2024-03-15 ASSESSMENT — PAIN DESCRIPTION - LOCATION: LOCATION: ABDOMEN

## 2024-03-15 ASSESSMENT — PAIN SCALES - GENERAL: PAINLEVEL_OUTOF10: 8

## 2024-03-16 LAB
EKG ATRIAL RATE: 76 BPM
EKG P AXIS: 52 DEGREES
EKG P-R INTERVAL: 136 MS
EKG Q-T INTERVAL: 396 MS
EKG QRS DURATION: 90 MS
EKG QTC CALCULATION (BAZETT): 445 MS
EKG R AXIS: 77 DEGREES
EKG T AXIS: 71 DEGREES
EKG VENTRICULAR RATE: 76 BPM

## 2024-03-16 PROCEDURE — 93010 ELECTROCARDIOGRAM REPORT: CPT | Performed by: INTERNAL MEDICINE

## 2024-03-16 NOTE — ED TRIAGE NOTES
PT to the ED with abdominal pain  and shortness of breath. PT states she had pneumonia in January but shortness of breath has increased in the past few days. PT denies any fevers or cold symptoms. PT states she has also had upper abdominal pian for a while not and that she has a hiatal hernia. PT states she is also having some nausea due to the pain.

## 2024-03-16 NOTE — DISCHARGE INSTRUCTIONS
You were seen evaluated emergency department today for shortness of breath and abdominal pain.  You tested positive for COVID-19 negative for influenza.  Rest your laboratory results were reassuring.  CT scan of your chest abdomen pelvis did not reveal any emergent findings at this time.  Fill your prescription for Zofran to take as needed for nausea and vomiting up to 3 times daily.  We recommend following up with your primary care provider in the next 1 to 2 days for further evaluation.  You may monitor your oxygen at home by finger pulse ox, please return to emergency department if your oxygen levels were below 90%, significant chest pain, shortness of breath, any other worrisome changes.

## 2024-03-16 NOTE — ED PROVIDER NOTES
I performed a history and physical examination of the patient and discussed management with the resident. I reviewed the resident’s note and agree with the documented findings and plan of care. Any areas of disagreement are noted on the chart. I was personally present for the key portions of any procedures. I have documented in the chart those procedures where I was not present during the key portions. I have reviewed the emergency nurses triage note. I agree with the chief complaint, past medical history, past surgical history, allergies, medications, social and family history as documented unless otherwise noted below. Documentation of the HPI, Physical Exam and Medical Decision Making performed by medical students or scribes is based on my personal performance of the HPI, PE and MDM. For Phys Assistant/ Nurse Practitioner cases/documentation I have personally evaluated this patient and have completed at least one if not all key elements of the E/M (history, physical exam, and MDM). My findings are as noted below.    In other words, I personally saw and examined the patient I have reviewed and agreed with the resident findings including all diagnostic interpretations and treatment plans as written.  I was present for the key portion of any procedures performed and the inclusive time noted in any critical care statement.    Patient presents today for lower chest pain shortness of breath abdominal pain.  Patient states it feels like when she had her Felipa-Barragan tear.  She states she was getting a little increasing shortness of breath denies any fever chills sweats.  Patient states she has not had any vomiting.  No blood.  She has some mild nausea.  Patient states a lot of this happened after she picked up a laundry basket several nights ago.  She says the pain came and stayed.  Patient has significant past medical history.  She is scheduled for upcoming surgery.  She has seen her gastroenterologist.  She wanted to 
    BUDESONIDE (PULMICORT) 0.5 MG/2ML NEBULIZER SUSPENSION    Take 2 mLs by nebulization 2 times daily    FLUCONAZOLE (DIFLUCAN) 100 MG TABLET    take 1 tablet by mouth once daily for 2 weeks    LIDOCAINE (LIDODERM) 5 %    apply 1 patch TO THE AFFECTED AREA DAILY. LEAVE ON FOR 12 HOURS AND THEN OFF FOR 12 HOURS.    PANTOPRAZOLE (PROTONIX) 40 MG TABLET    Take 2 tablets by mouth 2 times daily (before meals)    PREGABALIN (LYRICA) 50 MG CAPSULE    1 cap(s) orally TAKE 50MG QPM FOR 3 DAYS, THEN TAKE 50MG QAM AND QPM FOR 3 DAYS, THEN TAKE 50MG QAM AND 100MG QOM THEREAFTER for 30 days    SODIUM-POTASSIUM-MAG SULFATE (SUPREP) 17.5-3.13-1.6 GM/177ML SOLN SOLUTION    take as directed         SOCIAL HISTORY     Social History     Social History Narrative    Not on file     Social History     Tobacco Use    Smoking status: Every Day     Current packs/day: 1.00     Average packs/day: 1 pack/day for 16.0 years (16.0 ttl pk-yrs)     Types: Cigarettes    Smokeless tobacco: Never   Vaping Use    Vaping Use: Never used   Substance Use Topics    Alcohol use: Not Currently    Drug use: Yes     Types: Marijuana (Weed)     Comment: has med card last used few hours ago         ALLERGIES     Allergies   Allergen Reactions    Latex Rash and Other (See Comments)    Other Other (See Comments)     Patient stated surgical glue          FAMILY HISTORY     Family History   Problem Relation Age of Onset    Stroke Mother     Osteoarthritis Mother     Arthritis Mother     Migraines Mother     Heart Disease Mother     Alcohol Abuse Mother     Osteoarthritis Father     Obesity Father     Arthritis Father     Alcohol Abuse Father     Alcohol Abuse Sister          PHYSICAL EXAM     ED Triage Vitals [03/15/24 2010]   BP Temp Temp Source Pulse Respirations SpO2 Height Weight - Scale   123/72 98.1 °F (36.7 °C) Oral 82 18 98 % 1.524 m (5') 88.5 kg (195 lb)     Initial vital signs and nursing assessment reviewed and normal. Body mass index is 38.08 kg/m².

## 2024-03-20 ENCOUNTER — HOSPITAL ENCOUNTER (OUTPATIENT)
Dept: PULMONOLOGY | Age: 44
Discharge: HOME | End: 2024-03-20
Attending: FAMILY MEDICINE

## 2024-03-20 DIAGNOSIS — J20.9 ACUTE BRONCHITIS, UNSPECIFIED ORGANISM: ICD-10-CM

## 2024-03-27 ENCOUNTER — HOSPITAL ENCOUNTER (OUTPATIENT)
Age: 44
Discharge: HOME | End: 2024-03-27
Payer: COMMERCIAL

## 2024-03-27 ENCOUNTER — HOSPITAL ENCOUNTER (OUTPATIENT)
Dept: PULMONOLOGY | Age: 44
Discharge: HOME | End: 2024-03-27
Attending: FAMILY MEDICINE
Payer: COMMERCIAL

## 2024-03-27 PROCEDURE — G0480 DRUG TEST DEF 1-7 CLASSES: HCPCS

## 2024-03-27 PROCEDURE — 94060 EVALUATION OF WHEEZING: CPT

## 2024-03-27 PROCEDURE — 94726 PLETHYSMOGRAPHY LUNG VOLUMES: CPT

## 2024-03-27 PROCEDURE — 94729 DIFFUSING CAPACITY: CPT

## 2024-03-30 LAB — NICOTINE AND METABOLITES: NORMAL

## 2024-04-05 ENCOUNTER — HOSPITAL ENCOUNTER (OUTPATIENT)
Age: 44
Discharge: HOME | End: 2024-04-05
Payer: COMMERCIAL

## 2024-04-05 ENCOUNTER — OFFICE VISIT (OUTPATIENT)
Dept: PULMONOLOGY | Age: 44
End: 2024-04-05

## 2024-04-05 VITALS
TEMPERATURE: 98 F | BODY MASS INDEX: 39.66 KG/M2 | HEART RATE: 96 BPM | SYSTOLIC BLOOD PRESSURE: 128 MMHG | OXYGEN SATURATION: 98 % | WEIGHT: 202 LBS | HEIGHT: 60 IN | DIASTOLIC BLOOD PRESSURE: 80 MMHG

## 2024-04-05 DIAGNOSIS — J41.0 CHRONIC BRONCHITIS, SIMPLE (HCC): ICD-10-CM

## 2024-04-05 DIAGNOSIS — R06.83 SNORING: ICD-10-CM

## 2024-04-05 DIAGNOSIS — F51.9 SLEEP CHOKING SYNDROME: ICD-10-CM

## 2024-04-05 DIAGNOSIS — E66.9 OBESITY (BMI 30-39.9): ICD-10-CM

## 2024-04-05 DIAGNOSIS — Z87.891 PERSONAL HISTORY OF SMOKING: ICD-10-CM

## 2024-04-05 DIAGNOSIS — J41.0 SMOKERS' COUGH (HCC): ICD-10-CM

## 2024-04-05 DIAGNOSIS — R06.02 SOB (SHORTNESS OF BREATH): Primary | ICD-10-CM

## 2024-04-05 LAB — MRSA DNA SPEC QL NAA+PROBE: NEGATIVE

## 2024-04-05 PROCEDURE — 87641 MR-STAPH DNA AMP PROBE: CPT

## 2024-04-05 ASSESSMENT — ENCOUNTER SYMPTOMS
CHEST TIGHTNESS: 1
COUGH: 1
SORE THROAT: 1
TROUBLE SWALLOWING: 1
ABDOMINAL PAIN: 1
SHORTNESS OF BREATH: 1
WHEEZING: 1
BACK PAIN: 1

## 2024-04-05 NOTE — PROGRESS NOTES
Allergies   Allergen Reactions    Latex Rash and Other (See Comments)    Other Other (See Comments)     Patient stated surgical glue       Family History   Problem Relation Age of Onset    Stroke Mother     Osteoarthritis Mother     Arthritis Mother     Migraines Mother     Heart Disease Mother     Alcohol Abuse Mother     Osteoarthritis Father     Obesity Father     Arthritis Father     Alcohol Abuse Father     Alcohol Abuse Sister        There were no vitals taken for this visit.   Wt Readings from Last 3 Encounters:   03/15/24 88.5 kg (195 lb)   01/21/24 86.2 kg (190 lb)   01/10/24 92 kg (202 lb 12.8 oz)     Neck Circumference - 14 in; Mallampati Score - 2      Objective:   Physical Exam  Vitals and nursing note reviewed.   Constitutional:       General: She is not in acute distress.     Appearance: She is obese.   HENT:      Head: Normocephalic.      Nose: Nose normal.      Mouth/Throat:      Mouth: Mucous membranes are moist.      Pharynx: No oropharyngeal exudate.      Comments: Large tongue, crowded pharynx, mallampati class 3 .    Eyes:      General: No scleral icterus.  Neck:      Comments: 14 in  Cardiovascular:      Rate and Rhythm: Normal rate and regular rhythm.      Pulses: Normal pulses.      Heart sounds: No murmur heard.  Pulmonary:      Effort: Pulmonary effort is normal. No respiratory distress.      Breath sounds: No stridor. No wheezing, rhonchi or rales.   Abdominal:      Palpations: Abdomen is soft.   Musculoskeletal:         General: Normal range of motion.      Cervical back: Normal range of motion.      Right lower leg: No edema.      Left lower leg: No edema.   Lymphadenopathy:      Cervical: No cervical adenopathy.   Skin:     General: Skin is warm and dry.      Capillary Refill: Capillary refill takes less than 2 seconds.   Neurological:      General: No focal deficit present.         CT chest 3/15/24:    CT Chest W Contrast     COMPARISON: CR/SR - XR CHEST PORTABLE - 03/15/2024 08:34

## 2024-04-10 ENCOUNTER — OFFICE VISIT (OUTPATIENT)
Dept: UROLOGY | Age: 44
End: 2024-04-10
Payer: COMMERCIAL

## 2024-04-10 VITALS — RESPIRATION RATE: 14 BRPM | HEIGHT: 61 IN | WEIGHT: 205 LBS | BODY MASS INDEX: 38.71 KG/M2

## 2024-04-10 DIAGNOSIS — R30.0 DYSURIA: Primary | ICD-10-CM

## 2024-04-10 LAB
BILIRUBIN URINE: NEGATIVE
BLOOD URINE, POC: NEGATIVE
CHARACTER, URINE: CLEAR
COLOR, URINE: YELLOW
GLUCOSE URINE: NEGATIVE MG/DL
KETONES, URINE: NEGATIVE
LEUKOCYTE CLUMPS, URINE: NEGATIVE
NITRITE, URINE: NEGATIVE
PH, URINE: 5.5 (ref 5–9)
POST VOID RESIDUAL (PVR): 73 ML
PROTEIN, URINE: NEGATIVE MG/DL
SPECIFIC GRAVITY, URINE: 1.01 (ref 1–1.03)
UROBILINOGEN, URINE: 0.2 EU/DL (ref 0–1)

## 2024-04-10 PROCEDURE — 81003 URINALYSIS AUTO W/O SCOPE: CPT

## 2024-04-10 PROCEDURE — 1036F TOBACCO NON-USER: CPT

## 2024-04-10 PROCEDURE — 51798 US URINE CAPACITY MEASURE: CPT

## 2024-04-10 PROCEDURE — G8427 DOCREV CUR MEDS BY ELIG CLIN: HCPCS

## 2024-04-10 PROCEDURE — G8417 CALC BMI ABV UP PARAM F/U: HCPCS

## 2024-04-10 PROCEDURE — 99213 OFFICE O/P EST LOW 20 MIN: CPT

## 2024-04-10 ASSESSMENT — ENCOUNTER SYMPTOMS: BACK PAIN: 1

## 2024-04-10 NOTE — PATIENT INSTRUCTIONS
Follow-up for cystoscopy  Call with questions, comments, or concerns. I recommend going to the ED for further evaluation if you develop fever, chills, nausea, vomiting, chest pain, SOB, or calf pain.

## 2024-04-10 NOTE — PROGRESS NOTES
discharge.         Left eye: No discharge.      Conjunctiva/sclera: Conjunctivae normal.   Pulmonary:      Effort: Pulmonary effort is normal. No respiratory distress.   Musculoskeletal:         General: No deformity.      Cervical back: Normal range of motion and neck supple.   Skin:     General: Skin is warm and dry.   Neurological:      General: No focal deficit present.      Mental Status: She is alert.   Psychiatric:         Mood and Affect: Mood normal.         Behavior: Behavior normal.         Thought Content: Thought content normal.         Judgment: Judgment normal.         POC  Results for POC orders placed in visit on 04/10/24   POCT Urinalysis No Micro (Auto)   Result Value Ref Range    Glucose, Ur Negative NEGATIVE mg/dl    Bilirubin Urine Negative     Ketones, Urine Negative NEGATIVE    Specific Gravity, Urine 1.010 1.002 - 1.030    Blood, UA POC Negative NEGATIVE    pH, Urine 5.50 5.0 - 9.0    Protein, Urine Negative NEGATIVE mg/dl    Urobilinogen, Urine 0.20 0.0 - 1.0 eu/dl    Nitrite, Urine Negative NEGATIVE    Leukocyte Clumps, Urine Negative NEGATIVE    Color, Urine Yellow YELLOW-STRAW    Character, Urine Clear CLR-SL.CLOUD   poct post void residual   Result Value Ref Range    post void residual 73 ml            Recent BUN/Creatinine:  Lab Results   Component Value Date/Time    BUN 20 03/15/2024 08:14 PM    CREATININE 0.8 03/15/2024 08:14 PM       Radiology  The patient has had a CT a/p W IV contrast which I have independently reviewed along with its accompanying report.  The study demonstrates     FINDINGS:  Small hiatal hernia.  Diffusely hypodense liver consistent with hepatic steatosis. Hepatomegaly.  Normal spleen.  Normal kidneys.  Normal adrenal glands.  Normal pancreas.  No visible gallstones. No biliary dilation.     No evidence of bowel obstruction or colitis.  Normal appendix.  Poorly distended bladder.  Status post hysterectomy.  No ascites. No pneumoperitoneum.  No

## 2024-04-15 ENCOUNTER — HOSPITAL ENCOUNTER (OUTPATIENT)
Dept: PULMONOLOGY | Age: 44
Discharge: HOME | End: 2024-04-15
Attending: INTERNAL MEDICINE
Payer: COMMERCIAL

## 2024-04-15 DIAGNOSIS — R06.02 SOB (SHORTNESS OF BREATH): ICD-10-CM

## 2024-04-15 DIAGNOSIS — F51.9 SLEEP CHOKING SYNDROME: ICD-10-CM

## 2024-04-15 PROCEDURE — 6360000002 HC RX W HCPCS

## 2024-04-15 PROCEDURE — 94070 EVALUATION OF WHEEZING: CPT

## 2024-04-18 ENCOUNTER — HOSPITAL ENCOUNTER (OUTPATIENT)
Dept: SLEEP CENTER | Age: 44
Discharge: HOME | End: 2024-04-20
Payer: COMMERCIAL

## 2024-04-18 ENCOUNTER — HOSPITAL ENCOUNTER (EMERGENCY)
Age: 44
Discharge: HOME | End: 2024-04-18
Attending: EMERGENCY MEDICINE
Payer: COMMERCIAL

## 2024-04-18 ENCOUNTER — APPOINTMENT (OUTPATIENT)
Dept: GENERAL RADIOLOGY | Age: 44
End: 2024-04-18
Payer: COMMERCIAL

## 2024-04-18 ENCOUNTER — APPOINTMENT (OUTPATIENT)
Dept: INTERVENTIONAL RADIOLOGY/VASCULAR | Age: 44
End: 2024-04-18
Payer: COMMERCIAL

## 2024-04-18 VITALS
OXYGEN SATURATION: 98 % | HEIGHT: 61 IN | BODY MASS INDEX: 38.14 KG/M2 | HEART RATE: 78 BPM | SYSTOLIC BLOOD PRESSURE: 126 MMHG | DIASTOLIC BLOOD PRESSURE: 75 MMHG | TEMPERATURE: 97.6 F | WEIGHT: 202 LBS | RESPIRATION RATE: 18 BRPM

## 2024-04-18 DIAGNOSIS — R06.02 SOB (SHORTNESS OF BREATH): ICD-10-CM

## 2024-04-18 DIAGNOSIS — S80.11XA CONTUSION OF RIGHT LOWER LEG, INITIAL ENCOUNTER: Primary | ICD-10-CM

## 2024-04-18 DIAGNOSIS — F51.9 SLEEP CHOKING SYNDROME: ICD-10-CM

## 2024-04-18 PROCEDURE — 6370000000 HC RX 637 (ALT 250 FOR IP)

## 2024-04-18 PROCEDURE — 99284 EMERGENCY DEPT VISIT MOD MDM: CPT

## 2024-04-18 PROCEDURE — 93971 EXTREMITY STUDY: CPT

## 2024-04-18 PROCEDURE — 73552 X-RAY EXAM OF FEMUR 2/>: CPT

## 2024-04-18 PROCEDURE — 95806 SLEEP STUDY UNATT&RESP EFFT: CPT

## 2024-04-18 RX ORDER — HYDROCODONE BITARTRATE AND ACETAMINOPHEN 5; 325 MG/1; MG/1
1 TABLET ORAL ONCE
Status: COMPLETED | OUTPATIENT
Start: 2024-04-18 | End: 2024-04-18

## 2024-04-18 RX ADMIN — HYDROCODONE BITARTRATE AND ACETAMINOPHEN 1 TABLET: 5; 325 TABLET ORAL at 10:16

## 2024-04-18 ASSESSMENT — PAIN DESCRIPTION - PAIN TYPE: TYPE: ACUTE PAIN

## 2024-04-18 ASSESSMENT — PAIN DESCRIPTION - DESCRIPTORS: DESCRIPTORS: THROBBING

## 2024-04-18 ASSESSMENT — PAIN - FUNCTIONAL ASSESSMENT: PAIN_FUNCTIONAL_ASSESSMENT: 0-10

## 2024-04-18 ASSESSMENT — PAIN DESCRIPTION - ORIENTATION: ORIENTATION: RIGHT

## 2024-04-18 ASSESSMENT — PAIN DESCRIPTION - LOCATION: LOCATION: LEG

## 2024-04-18 ASSESSMENT — PAIN DESCRIPTION - FREQUENCY: FREQUENCY: CONTINUOUS

## 2024-04-18 NOTE — PROGRESS NOTES
Chelsey presents today for a HST instruction and demonstration on unit # 7079. Questions were asked and answers given.  She was able to return demonstration and verbalized understanding.  The sleep center control room phone number was provided in case questions arise during the study. Informed patient to call 911 in case of an emergency.   She states she will return the unit tomorrow before 1000.                       Title:  Home Sleep Apnea Testing (HSAT)     Approved by:  Radhika Funk MD        Approval Date:   March, 2024 Next Review:   March, 2026       Responsible Party:  Connie Baca  Institution/Entities Applies to:    [facility]    Policy Number:  None      Document Type:  Such as Guideline, Policy,   Policy & Procedure, or Procedure, Instructions   Manual:  Policy and Procedures     Section: IV  Policy Start Date: June, 2011       HOME SLEEP APNEA TESTING (HSAT)      PURPOSE: To ensure home sleep apnea testing (HSAT) conducted by the sleep facility adheres to the current AASM practice parameters, clinical practice guidelines, best practice and clinical guidelines in regard to the diagnosis of OLVIN in adults.       POLICY:      HSAT is a method of recording certain parameters which will target and measure, minimally, heart rate, oxygen saturation, respiratory airflow, respiratory effort and snoring for the purpose of evaluating a patient for OLVIN.       HSAT will be performed in conjunction with a comprehensive sleep evaluation by an appropriately licensed sleep facility medical staff member. All portable monitoring equipment will be FDA-approved and appropriately maintained to ensure patient safety and efficiency of the test.       PROCEDURE:      An order from a licensed sleep physician along with appropriate medical history documenting the indication for HSAT that complies with the AASM practice parameters.    All tests will be performed, and records will be

## 2024-04-18 NOTE — ED PROVIDER NOTES
[facility] EMERGENCY DEPT      EMERGENCY MEDICINE     Pt Name: Chelsey Juarez  MRN: [id number]  Birthdate 1980  Date of evaluation: 4/18/2024  Resident Physician: Yosef Sanchez MD  Attending Physician: Mundo Irvin MD    CHIEF COMPLAINT       Chief Complaint   Patient presents with    Leg Pain     HISTORY OF PRESENT ILLNESS   Chelsey Juarez is a 43 y.o. female who presents to the emergency department from home, by private vehicle for evaluation of thigh pain.    Patient reports that she does not remember any traumas but when she was putting her pants cyanosis she had a swelling bulge that was painful in her medial thigh on the right leg.  Patient was concerned that it was a blood clot because her father had a history of blood clots.  Patient says that send tender the touch and 7 out of 10 with pain with walking.  Patient herself does not have a history of blood clots.      The patient has no other acute complaints at this time.    ROS Negative Except as Documented Above.    PASTMEDICAL HISTORY     Past Medical History:   Diagnosis Date    Back pain     chronic    Cystitis, interstitial     Esophageal ulcer     GERD (gastroesophageal reflux disease)     Headache(784.0)     Hyperlipidemia     Insulin resistance     Irritable bowel syndrome     Spondylolisthesis     Spondylolysis        Patient Active Problem List   Diagnosis Code    Metabolic syndrome E88.810    Insulin resistance E88.819    Papilledema H47.10    Weight gain     Migraine G43.909    Abdominal pain R10.9    PCO (polycystic ovaries) E28.2    Abdominal adhesions K66.0    Thrombosed hemorrhoids K64.5     SURGICAL HISTORY       Past Surgical History:   Procedure Laterality Date    ABDOMINAL ADHESION SURGERY  01/01/2012    Dr. Guardado    ANKLE SURGERY Right 2011    COLONOSCOPY  2005, 2011    Dr. Brooks    CYST REMOVAL  01/01/2011    right ankle-Dr. Hernandez    HYSTERECTOMY (CERVIX STATUS UNKNOWN)  2010    Dr. Guardado    OVARY REMOVAL  2010       HYDROcodone-acetaminophen (NORCO) 5-325 MG per tablet 1 tablet (1 tablet Oral Given 4/18/24 1016)         PROCEDURES: (None if blank)  Procedures:     CRITICAL CARE: (Please see Attending note / Attestation regarding Critical Care Time. )      DISCHARGE PRESCRIPTIONS: (None if blank)  Current Discharge Medication List          FINAL IMPRESSION      1. Contusion of right lower leg, initial encounter          DISPOSITION/PLAN   DISPOSITION Decision To Discharge 04/18/2024 10:40:05 AM      OUTPATIENT FOLLOW UP THE PATIENT:  [facility] EMERGENCY DEPT  [address] [place] [address]  [phone]        Ashwin Cruz MD  [address] [place] [address]  [phone]            Yosef Sanchez MD    This transcription was electronically signed. Parts of this transcriptions may have been dictated by use of voice recognition software and electronically transcribed, and parts may have been transcribed with the assistance of an ED scribe. The transcription may contain errors not detected in proofreading.  Please refer to my supervising physician's documentation if my documentation differs.    Electronically Signed: Yosef Sanchez MD, 04/18/24, 10:42 AM

## 2024-04-18 NOTE — DISCHARGE INSTR - COC
Continuity of Care Form    Patient Name: Chelsey Juarez   :  1980  MRN:  [id number]    Admit date:  2024  Discharge date:  ***    Code Status Order: No Order   Advance Directives:     Admitting Physician:  No admitting provider for patient encounter.  PCP: Ashwin Cruz MD    Discharging Nurse: ***  Discharging Hospital Unit/Room#: 05005A  Discharging Unit Phone Number: ***    Emergency Contact:   Extended Emergency Contact Information  Primary Emergency Contact: Rhett Juarez  Home Phone: [phone]  Mobile Phone: [phone]  Relation: Child   needed? No    Past Surgical History:  Past Surgical History:   Procedure Laterality Date    ABDOMINAL ADHESION SURGERY  2012    Dr. Guardado    ANKLE SURGERY Right 2011    COLONOSCOPY  ,     Dr. Brooks    CYST REMOVAL  2011    right ankle-Dr. Hernandez    HYSTERECTOMY (CERVIX STATUS UNKNOWN)      Dr. Guardado    OVARY REMOVAL      SHOULDER ARTHROSCOPY Left 2023    LEFT SHOULDER ARTHROSCOPY, BICEP TENODESIS, DEBRIDEMENT, DISTAL CLAVICLE EXCISION performed by Maikol Joshi DO at [facility] OR    TONSILLECTOMY AND ADENOIDECTOMY  1987    WISDOM TOOTH EXTRACTION  1996       Immunization History:     There is no immunization history on file for this patient.    Active Problems:  Patient Active Problem List   Diagnosis Code    Metabolic syndrome E88.810    Insulin resistance E88.819    Papilledema H47.10    Weight gain     Migraine G43.909    Abdominal pain R10.9    PCO (polycystic ovaries) E28.2    Abdominal adhesions K66.0    Thrombosed hemorrhoids K64.5       Isolation/Infection:   Isolation            No Isolation          Patient Infection Status       Infection Onset Added Last Indicated Last Indicated By Review Planned Expiration Resolved Resolved By    None active    Resolved    COVID-19 03/15/24 03/15/24 03/15/24 COVID-19 & Influenza Combo   24 Infection                        Nurse  only, NOT a DME order):  {EQUIPMENT:707348110}  Other Treatments: ***    Patient's personal belongings (please select all that are sent with patient):  {CHP DME Belongings:973790583}    RN SIGNATURE:  {Esignature:479809096}    CASE MANAGEMENT/SOCIAL WORK SECTION    Inpatient Status Date: ***    Readmission Risk Assessment Score:  Readmission Risk              Risk of Unplanned Readmission:  0           Discharging to Facility/ Agency   Name:   Address:  Phone:  Fax:    Dialysis Facility (if applicable)   Name:  Address:  Dialysis Schedule:  Phone:  Fax:    / signature: {Esignature:126286361}    PHYSICIAN SECTION    Prognosis: {Prognosis:4036606309}    Condition at Discharge: { Patient Condition:935468416}    Rehab Potential (if transferring to Rehab): {Prognosis:2101845330}    Recommended Labs or Other Treatments After Discharge: ***    Physician Certification: I certify the above information and transfer of Chelsey Juarez  is necessary for the continuing treatment of the diagnosis listed and that she requires {Admit to Appropriate Level of Care:25882} for {GREATER/LESS:054079151} 30 days.     Update Admission H&P: {CHP DME Changes in HandP:700185060}    PHYSICIAN SIGNATURE:  {Esignature:283679154}

## 2024-04-18 NOTE — DISCHARGE INSTRUCTIONS
Please follow-up with your primary care doctor following your visit today.  Unclear the cause of your swelling however on point-of-care ultrasound we did see significant edema and fluid surrounding the muscle fascicles as well as the blood vessel.  This could be a leaky capillary bed.  Most of the hide this is secondary to trauma but there are other causes.  This is unlikely to be life-threatening.  No evidence of DVT on ultrasound.  To monitor yourself for worsening signs and symptoms of a DVT however including calf tenderness, pain in the calf with range of motion of the ankle, swelling in 1 calf greater than the other, and color change and return emerged part if you develop any other symptoms.

## 2024-04-18 NOTE — ED NOTES
Patient to ED for concern of a DVT in the right thigh. Patient reports noticing a lump and some bruising this AM. Patient denies hx of DVTs

## 2024-04-18 NOTE — ED PROVIDER NOTES
[facility]  EMERGENCY MEDICINE ATTENDING ATTESTATION      Evaluation of Chelsey Juarez.   Case discussed and care plan developed with resident physician.   I agree with the resident physician documentation and plan as documented by him, except if my documentation differs.   Patient seen under indirect supervision  I reviewed the medical, surgical, family and social history, medications and allergies.   I have reviewed and interpreted all available lab, radiology and ekg results available at the moment.  I have reviewed the nursing documentation.       Please see the resident physician completed note for final disposition except as documented on this attestation.   I have reviewed and interpreted all available lab, radiology and ekg results available at the moment.  Diagnosis, treatment and disposition plans were discussed and agreed upon by patient.   This transcription was electronically signed. It was dictated by use of voice recognition software and electronically transcribed. The transcription may contain errors not detected in proofreading.     I performed direct supervision and was present for the critical portion following procedures: None  Critical care time on this case: None    Electronically signed by Mundo Moncada MD on 4/18/24 at 10:30 AM EDT        Mundo Moncada MD  04/18/24 1033

## 2024-04-19 NOTE — PROGRESS NOTES
[place] [facility]    Chelsey Juarez, 43 y.o.  [id number]      Pt of ***  Nurses Notes   ***  Study Results  Initial Study Date -  ***  AHI -  ***    TotalEvents - ***  (Apneas  ***  Hypopneas ***  Central  ***)  LM w/Arousals - ***  Sleep Efficiency - *** % (Total Sleep Time - *** min)  Time with Sats below 88% - *** min  Interval History       Chelsey Juarez is a 43 y.o. old female who comes in to review the results of her recent sleep study, to answer questions and to explore options for treatment. she continues to have symptoms of {Sleep apnea:80993}  Allergies  Allergies   Allergen Reactions    Latex Rash and Other (See Comments)    Other Hives     Patient stated surgical glue -hives      Meds  Current Outpatient Medications   Medication Sig Dispense Refill    cyclobenzaprine (FLEXERIL) 10 MG tablet Take 1 tablet by mouth 3 times daily as needed for Muscle spasms      ondansetron (ZOFRAN-ODT) 4 MG disintegrating tablet Take 1 tablet by mouth 3 times daily as needed for Nausea or Vomiting 21 tablet 0    budesonide (PULMICORT) 0.5 MG/2ML nebulizer suspension Take 2 mLs by nebulization 2 times daily 60 each 0    ALPRAZolam (XANAX) 0.25 MG tablet Take 1 tablet by mouth every 6 hours as needed for Anxiety.      lidocaine (LIDODERM) 5 % apply 1 patch TO THE AFFECTED AREA DAILY. LEAVE ON FOR 12 HOURS AND THEN OFF FOR 12 HOURS. (Patient not taking: Reported on 4/10/2024)      pantoprazole (PROTONIX) 40 MG tablet Take 1 tablet by mouth 2 times daily (before meals)      albuterol sulfate HFA (VENTOLIN HFA) 108 (90 Base) MCG/ACT inhaler Inhale 2 puffs into the lungs every 6 hours as needed for Wheezing (Patient not taking: Reported on 4/10/2024)      albuterol (PROVENTIL) (2.5 MG/3ML) 0.083% nebulizer solution Take 3 mLs by nebulization every 6 hours as needed for Wheezing or Shortness of Breath (Patient not taking: Reported on 4/10/2024) 120 each 0     No current facility-administered

## 2024-04-22 ENCOUNTER — HOSPITAL ENCOUNTER (OUTPATIENT)
Age: 44
Discharge: HOME | End: 2024-04-22
Payer: COMMERCIAL

## 2024-04-22 ENCOUNTER — HOSPITAL ENCOUNTER (OUTPATIENT)
Dept: GENERAL RADIOLOGY | Age: 44
Discharge: HOME | End: 2024-04-22
Payer: COMMERCIAL

## 2024-04-22 ENCOUNTER — OFFICE VISIT (OUTPATIENT)
Dept: PULMONOLOGY | Age: 44
End: 2024-04-22
Payer: COMMERCIAL

## 2024-04-22 VITALS
HEART RATE: 77 BPM | OXYGEN SATURATION: 97 % | DIASTOLIC BLOOD PRESSURE: 80 MMHG | TEMPERATURE: 97.8 F | SYSTOLIC BLOOD PRESSURE: 128 MMHG | BODY MASS INDEX: 38.71 KG/M2 | HEIGHT: 61 IN | WEIGHT: 205 LBS

## 2024-04-22 DIAGNOSIS — R06.02 SOB (SHORTNESS OF BREATH): ICD-10-CM

## 2024-04-22 DIAGNOSIS — J41.0 SMOKERS' COUGH (HCC): ICD-10-CM

## 2024-04-22 DIAGNOSIS — J45.40 MODERATE PERSISTENT ASTHMA WITHOUT COMPLICATION: Primary | ICD-10-CM

## 2024-04-22 DIAGNOSIS — R06.2 WHEEZING: ICD-10-CM

## 2024-04-22 DIAGNOSIS — R13.19 OTHER DYSPHAGIA: ICD-10-CM

## 2024-04-22 DIAGNOSIS — J45.40 MODERATE PERSISTENT ASTHMA WITHOUT COMPLICATION: ICD-10-CM

## 2024-04-22 DIAGNOSIS — G47.33 OSA (OBSTRUCTIVE SLEEP APNEA): Primary | ICD-10-CM

## 2024-04-22 DIAGNOSIS — E66.9 OBESITY (BMI 30-39.9): ICD-10-CM

## 2024-04-22 LAB
BASOPHILS ABSOLUTE: 0 THOU/MM3 (ref 0–0.1)
BASOPHILS NFR BLD AUTO: 0.3 %
DEPRECATED RDW RBC AUTO: 43.8 FL (ref 35–45)
EOSINOPHIL NFR BLD AUTO: 1.7 %
EOSINOPHILS ABSOLUTE: 0.1 THOU/MM3 (ref 0–0.4)
ERYTHROCYTE [DISTWIDTH] IN BLOOD BY AUTOMATED COUNT: 13 % (ref 11.5–14.5)
HCT VFR BLD AUTO: 40.8 % (ref 37–47)
HGB BLD-MCNC: 13.6 GM/DL (ref 12–16)
IMM GRANULOCYTES # BLD AUTO: 0.03 THOU/MM3 (ref 0–0.07)
IMM GRANULOCYTES NFR BLD AUTO: 0.4 %
LYMPHOCYTES ABSOLUTE: 3.1 THOU/MM3 (ref 1–4.8)
LYMPHOCYTES NFR BLD AUTO: 41.4 %
MCH RBC QN AUTO: 30.5 PG (ref 26–33)
MCHC RBC AUTO-ENTMCNC: 33.3 GM/DL (ref 32.2–35.5)
MCV RBC AUTO: 91.5 FL (ref 81–99)
MONOCYTES ABSOLUTE: 0.5 THOU/MM3 (ref 0.4–1.3)
MONOCYTES NFR BLD AUTO: 6.9 %
NEUTROPHILS NFR BLD AUTO: 49.3 %
NRBC BLD AUTO-RTO: 0 /100 WBC
PLATELET # BLD AUTO: 261 THOU/MM3 (ref 130–400)
PMV BLD AUTO: 11.4 FL (ref 9.4–12.4)
RBC # BLD AUTO: 4.46 MILL/MM3 (ref 4.2–5.4)
SEGMENTED NEUTROPHILS ABSOLUTE COUNT: 3.7 THOU/MM3 (ref 1.8–7.7)
WBC # BLD AUTO: 7.5 THOU/MM3 (ref 4.8–10.8)

## 2024-04-22 PROCEDURE — 3023F SPIROM DOC REV: CPT | Performed by: NURSE PRACTITIONER

## 2024-04-22 PROCEDURE — 36415 COLL VENOUS BLD VENIPUNCTURE: CPT

## 2024-04-22 PROCEDURE — 1036F TOBACCO NON-USER: CPT | Performed by: NURSE PRACTITIONER

## 2024-04-22 PROCEDURE — 86003 ALLG SPEC IGE CRUDE XTRC EA: CPT

## 2024-04-22 PROCEDURE — 99214 OFFICE O/P EST MOD 30 MIN: CPT | Performed by: NURSE PRACTITIONER

## 2024-04-22 PROCEDURE — 82785 ASSAY OF IGE: CPT

## 2024-04-22 PROCEDURE — 71046 X-RAY EXAM CHEST 2 VIEWS: CPT

## 2024-04-22 PROCEDURE — G8427 DOCREV CUR MEDS BY ELIG CLIN: HCPCS | Performed by: NURSE PRACTITIONER

## 2024-04-22 PROCEDURE — 94664 DEMO&/EVAL PT USE INHALER: CPT | Performed by: NURSE PRACTITIONER

## 2024-04-22 PROCEDURE — G8417 CALC BMI ABV UP PARAM F/U: HCPCS | Performed by: NURSE PRACTITIONER

## 2024-04-22 PROCEDURE — 85025 COMPLETE CBC W/AUTO DIFF WBC: CPT

## 2024-04-22 RX ORDER — ALBUTEROL SULFATE 90 UG/1
2 AEROSOL, METERED RESPIRATORY (INHALATION) EVERY 4 HOURS PRN
Qty: 1 EACH | Refills: 3 | Status: SHIPPED | OUTPATIENT
Start: 2024-04-22 | End: 2024-04-22

## 2024-04-22 RX ORDER — MONTELUKAST SODIUM 10 MG/1
10 TABLET ORAL NIGHTLY
Qty: 90 TABLET | Refills: 3 | Status: SHIPPED | OUTPATIENT
Start: 2024-04-22

## 2024-04-22 ASSESSMENT — ENCOUNTER SYMPTOMS
EYES NEGATIVE: 1
ALLERGIC/IMMUNOLOGIC NEGATIVE: 1
BACK PAIN: 1
WHEEZING: 1
TROUBLE SWALLOWING: 1
SHORTNESS OF BREATH: 1
COUGH: 1
GASTROINTESTINAL NEGATIVE: 1
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
## 2024-04-23 ENCOUNTER — TELEPHONE (OUTPATIENT)
Dept: PULMONOLOGY | Age: 44
End: 2024-04-23

## 2024-04-23 NOTE — TELEPHONE ENCOUNTER
Patient calling in concerned that she was given the paperwork of another patient and she didn't receive the Medical Provider verification form confirming she had a office visit 4-22-24. She will need this paperwork filled out, please call patient when the form is completed and she will  from the office.

## 2024-04-24 ENCOUNTER — OFFICE VISIT (OUTPATIENT)
Dept: PULMONOLOGY | Age: 44
End: 2024-04-24
Payer: COMMERCIAL

## 2024-04-24 VITALS
DIASTOLIC BLOOD PRESSURE: 76 MMHG | HEART RATE: 79 BPM | TEMPERATURE: 97.9 F | HEIGHT: 61 IN | SYSTOLIC BLOOD PRESSURE: 124 MMHG | WEIGHT: 202.2 LBS | BODY MASS INDEX: 38.18 KG/M2 | OXYGEN SATURATION: 98 %

## 2024-04-24 DIAGNOSIS — F51.9 SLEEP CHOKING SYNDROME: ICD-10-CM

## 2024-04-24 DIAGNOSIS — G47.33 OSA (OBSTRUCTIVE SLEEP APNEA): Primary | ICD-10-CM

## 2024-04-24 DIAGNOSIS — R06.83 SNORING: ICD-10-CM

## 2024-04-24 DIAGNOSIS — E66.9 OBESITY (BMI 30-39.9): ICD-10-CM

## 2024-04-24 PROCEDURE — 99213 OFFICE O/P EST LOW 20 MIN: CPT | Performed by: NURSE PRACTITIONER

## 2024-04-24 PROCEDURE — 1036F TOBACCO NON-USER: CPT | Performed by: NURSE PRACTITIONER

## 2024-04-24 PROCEDURE — G8417 CALC BMI ABV UP PARAM F/U: HCPCS | Performed by: NURSE PRACTITIONER

## 2024-04-24 PROCEDURE — G8427 DOCREV CUR MEDS BY ELIG CLIN: HCPCS | Performed by: NURSE PRACTITIONER

## 2024-04-24 ASSESSMENT — ENCOUNTER SYMPTOMS
COUGH: 0
BACK PAIN: 1
ALLERGIC/IMMUNOLOGIC NEGATIVE: 1
EYES NEGATIVE: 1
GASTROINTESTINAL NEGATIVE: 1
WHEEZING: 0
SHORTNESS OF BREATH: 1

## 2024-04-24 NOTE — RESULT ENCOUNTER NOTE
Called patient, voicemail is full. Unable to leave message notifying her that CXR came back normal.

## 2024-04-24 NOTE — PROGRESS NOTES
[place] [facility] Pulmonary, CriticalCare and Sleep Medicine    Chelesy Juarez, 43 y.o.  [id number]      Pt of [facility]  Nurses Notes   Here to discuss results of recent HST.  Study Results  Initial Study Date -  4/18/2024  AHI -  21.6    TotalEvents - 150  (Apneas  35  Hypopneas 115  Central  0)  Total MonitoringTime - 441.0 min  Time with Sats below 88% - 11.6 min  Interval History       Chelsey Juarez is a 43 y.o. old female who comes in to review the results of her recent sleep study, to answer questions and to explore options for treatment. she continues to have symptoms of snoring, choking, periods of not breathing, tossing and turning, decreased memory, decreased concentration, feels sleepy during the day  BMI 38  NO previous sleep testing ever completed   Xanax use PRN  Flexeril use PRN   No sleep medication use at night                           Allergies  Allergies   Allergen Reactions    Latex Rash and Other (See Comments)    Other Hives     Patient stated surgical glue -hives      Meds  Current Outpatient Medications   Medication Sig Dispense Refill    fluticasone-umeclidin-vilant (TRELEGY ELLIPTA) 200-62.5-25 MCG/ACT AEPB inhaler Inhale 1 puff into the lungs daily 1 each 11    montelukast (SINGULAIR) 10 MG tablet Take 1 tablet by mouth nightly 90 tablet 3    cyclobenzaprine (FLEXERIL) 10 MG tablet Take 1 tablet by mouth 3 times daily as needed for Muscle spasms      ondansetron (ZOFRAN-ODT) 4 MG disintegrating tablet Take 1 tablet by mouth 3 times daily as needed for Nausea or Vomiting 21 tablet 0    ALPRAZolam (XANAX) 0.25 MG tablet Take 1 tablet by mouth every 6 hours as needed for Anxiety.      lidocaine (LIDODERM) 5 %       pantoprazole (PROTONIX) 40 MG tablet Take 1 tablet by mouth 2 times daily (before meals)      albuterol sulfate HFA (VENTOLIN HFA) 108 (90 Base) MCG/ACT inhaler Inhale 2 puffs into the lungs every 6 hours as needed for Wheezing      albuterol (PROVENTIL) (2.5 MG/3ML) 0.083% nebulizer

## 2024-04-26 LAB
ALLERGEN BERMUDA GRASS IGE: < 0.1 KU/L (ref 0–0.34)
ALLERGEN BIRCH IGE: < 0.1 KU/L (ref 0–0.34)
ALLERGEN DOG DANDER IGE: < 0.1 KU/L (ref 0–0.34)
ALLERGEN GERMAN COCKROACH IGE: < 0.1 KU/L (ref 0–0.34)
ALLERGEN HORMODENDRUM IGE: < 0.1 KUL/L (ref 0–0.34)
ALLERGEN MOUSE EPITHELIA IGE: < 0.1 KU/L (ref 0–0.34)
ALLERGEN OAK TREE IGE: < 0.1 KU/L (ref 0–0.34)
ALLERGEN PECAN TREE IGE: < 0.1 KU/L (ref 0–0.34)
ALLERGEN PIGWEED ROUGH IGE: < 0.1 KU/L (ref 0–0.34)
ALLERGEN SHEEP SORREL (W18) IGE: < 0.1 KU/L (ref 0–0.34)
ALLERGEN TREE SYCAMORE: < 0.1 KU/L (ref 0–0.34)
ALLERGEN WALNUT TREE IGE: < 0.1 KU/L (ref 0–0.34)
ALLERGEN WHITE MULBERRY TREE, IGE: < 0.1 KU/L (ref 0–0.34)
ALLERGEN, TREE, WHITE ASH IGE: < 0.1 KU/L (ref 0–0.34)
ALTERNARIA ALTERNATA: < 0.1 KU/L (ref 0–0.34)
ASPERGILLUS FUMIGATUS: < 0.1 KU/L (ref 0–0.34)
CAT DANDER ANTIBODY: < 0.1 KU/L (ref 0–0.34)
COTTONWOOD TREE: < 0.1 KU/L (ref 0–0.34)
D. FARINAE: < 0.1 KU/L (ref 0–0.34)
D. PTERONYSSINUS: < 0.1 KU/L (ref 0–0.34)
ELM TREE: < 0.1 KU/L (ref 0–0.34)
IGE SERPL-ACNC: 28 IU/ML (ref 0–100)
MAPLE/BOXELDER TREE: < 0.1 KU/L (ref 0–0.34)
MOUNTAIN CEDAR TREE: < 0.1 KU/L (ref 0–0.34)
MUCOR RACEMOSUS: < 0.1 KU/L (ref 0–0.34)
P. NOTATUM: < 0.1 KU/L (ref 0–0.34)
RUSSIAN THISTLE: < 0.1 KU/L (ref 0–0.34)
SHORT RAGWD(A ARTEMIS.) IGE: NORMAL KU/L (ref 0–0.34)
TIMOTHY GRASS: < 0.1 KU/L (ref 0–0.34)

## 2024-04-29 ENCOUNTER — HOSPITAL ENCOUNTER (OUTPATIENT)
Dept: GENERAL RADIOLOGY | Age: 44
Discharge: HOME | End: 2024-04-29
Payer: COMMERCIAL

## 2024-04-29 DIAGNOSIS — R13.19 OTHER DYSPHAGIA: ICD-10-CM

## 2024-04-29 PROCEDURE — 74230 X-RAY XM SWLNG FUNCJ C+: CPT

## 2024-04-29 PROCEDURE — 2500000003 HC RX 250 WO HCPCS: Performed by: NURSE PRACTITIONER

## 2024-04-29 PROCEDURE — 92611 MOTION FLUOROSCOPY/SWALLOW: CPT

## 2024-04-29 RX ADMIN — BARIUM SULFATE 30 ML: 400 PASTE ORAL at 08:22

## 2024-04-29 RX ADMIN — BARIUM SULFATE 100 ML: 0.81 POWDER, FOR SUSPENSION ORAL at 08:22
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
## 2024-05-06 ENCOUNTER — HOSPITAL ENCOUNTER (OUTPATIENT)
Dept: UROLOGY | Age: 44
Discharge: HOME | End: 2024-05-06
Payer: COMMERCIAL

## 2024-05-06 VITALS
OXYGEN SATURATION: 97 % | HEART RATE: 96 BPM | HEIGHT: 61 IN | RESPIRATION RATE: 16 BRPM | DIASTOLIC BLOOD PRESSURE: 81 MMHG | WEIGHT: 205.4 LBS | SYSTOLIC BLOOD PRESSURE: 121 MMHG | TEMPERATURE: 98.1 F | BODY MASS INDEX: 38.78 KG/M2

## 2024-05-06 LAB
BILIRUBIN, URINE: NEGATIVE
BLOOD URINE, POC: NEGATIVE
CHARACTER, URINE: CLEAR
COLOR: YELLOW
GLUCOSE URINE: NEGATIVE MG/DL
KETONES, URINE: NEGATIVE
LEUKOCYTE CLUMPS, URINE: NEGATIVE
NITRITE, URINE: NEGATIVE
PH, URINE: 5.5 (ref 5–9)
PROTEIN, URINE: NEGATIVE MG/DL
SPECIFIC GRAVITY UA: 1.01 (ref 1–1.03)
UROBILINOGEN, URINE: 0.2 EU/DL (ref 0–1)

## 2024-05-06 PROCEDURE — 52000 CYSTOURETHROSCOPY: CPT

## 2024-05-06 RX ORDER — DOXYCYCLINE HYCLATE 100 MG
100 TABLET ORAL 2 TIMES DAILY
Qty: 6 TABLET | Refills: 0 | Status: SHIPPED | OUTPATIENT
Start: 2024-05-06 | End: 2024-05-09

## 2024-05-06 NOTE — DISCHARGE INSTRUCTIONS
Discharge instructions: Cystoscopy  You May experience painful urination and see blood in the urine after your procedure.  This should resolve over time.      Pt ok to discharge home in good condition  No heavy lifting, >10 lbs for today  Pt should avoid strenuous activity for today  Pt should walk moderately at home  Pt ok to shower   Pt may resume diet as tolerated  Please call attending physician or hospital  with questions  Call or Present to ED if fever (> 101F), intractable nausea vomiting or pain.    Follow up with BROOKE Patrick in 3 months or sooner if needed for further management of IC.  Office will call to schedule.

## 2024-05-06 NOTE — OP NOTE
Cystoscopy Operative Note  Surgeon: Sarah  Anesthesia: Urethral 2%  Indications: IC  Position: supine  EBL: 1cc  Specimen: none  Findings:   The patient was prepped and draped in the usual sterile fashion.  The flexible cystoscope was advanced through the urethra and into the bladder.  The bladder was thoroughly inspected and the following was noted:    Vagina: normal appearing vagina with normal color and discharge, no lesions  Residual Urine: none  Urethra: normal appearing urethra with no masses, tenderness or lesions  Bladder: No hunner's ulcers, no glomerulations, No tumors or CIS noted.  No bladder diverticulum.  none trabeculation noted.  Ureters: Clear efflux from both ureters.  Orifices with normal configuration and location.    The cystoscope was removed.  The patient tolerated the procedure well.    No obvious lesions  Based off symptoms she may have IC  Follow up with Celina, may refer for hydro distension if patient elects

## 2024-05-06 NOTE — H&P
[facility]  Urology H&P Note     Patient:  Chelsey Juarez  MRN: [id number]  YOB: 1980    ATTENDING: Steve Smith Jr, MD     CHIEF COMPLAINT:  Unusual bladder symptoms    HISTORY OF PRESENT ILLNESS:   The patient is a 43 y.o. female who presents with unusual bladder symptoms    Patient's old records, notes and chart reviewed and summarized above.     Past Medical History:    Past Medical History:   Diagnosis Date    Airway obstruction     Back pain     chronic    Cystitis, interstitial     Esophageal ulcer     GERD (gastroesophageal reflux disease)     Headache(784.0)     Hyperlipidemia     Insulin resistance     Irritable bowel syndrome     Spondylolisthesis     Spondylolysis        Past Surgical History:    Past Surgical History:   Procedure Laterality Date    ABDOMINAL ADHESION SURGERY  01/01/2012    Dr. Guardado    ANKLE SURGERY Right 2011    COLONOSCOPY  2005, 2011    Dr. Brooks    CYST REMOVAL  01/01/2011    right ankle-Dr. Hernandez    HYSTERECTOMY (CERVIX STATUS UNKNOWN)  2010    Dr. Guardado    OVARY REMOVAL  2010    SHOULDER ARTHROSCOPY Left 04/17/2023    LEFT SHOULDER ARTHROSCOPY, BICEP TENODESIS, DEBRIDEMENT, DISTAL CLAVICLE EXCISION performed by Maikol Joshi DO at [facility] OR    TONSILLECTOMY AND ADENOIDECTOMY  01/01/1987    WISDOM TOOTH EXTRACTION  01/01/1996       Medications Prior to Admission:   Prior to Admission medications    Medication Sig Start Date End Date Taking? Authorizing Provider   fluticasone-umeclidin-vilant (TRELEGY ELLIPTA) 200-62.5-25 MCG/ACT AEPB inhaler Inhale 1 puff into the lungs daily 4/22/24   Karoline Prater APRN - CNP   montelukast (SINGULAIR) 10 MG tablet Take 1 tablet by mouth nightly 4/22/24   Karoline Prater APRN - CNP   cyclobenzaprine (FLEXERIL) 10 MG tablet Take 1 tablet by mouth 3 times daily as needed for Muscle spasms    Provider, MD Briana   ondansetron (ZOFRAN-ODT) 4 MG disintegrating tablet Take 1 tablet by mouth 3 times daily as needed

## 2024-05-06 NOTE — PROGRESS NOTES
[facility]  This procedure has been fully reviewed with the patient and written informed consent has been obtained.  1030 Procedure started with Dr. Smith  1031 Procedure completed; patient tolerated well.  Dr. Smith talked to patient in length about procedure findings.  Patient discharged.    PLAN     Follow up with BROOKE Patrick in 3 months or sooner if needed for further management of IC.  Office will call to schedule.

## 2024-06-17 NOTE — PROGRESS NOTES
[facility]  Therapy Contact Note      Date: 2024  Patient Name: Chelsey Juarez        MRN: [id number]    Account Number: [id number]  : 1980  (43 y.o.)  Gender: female         Set up [facility] for 24 1015. Notified patient.     Priya Dixon, Rehab Tech

## 2024-06-25 ENCOUNTER — OFFICE VISIT (OUTPATIENT)
Dept: PULMONOLOGY | Age: 44
End: 2024-06-25
Payer: COMMERCIAL

## 2024-06-25 VITALS
HEIGHT: 60 IN | WEIGHT: 209.2 LBS | DIASTOLIC BLOOD PRESSURE: 80 MMHG | SYSTOLIC BLOOD PRESSURE: 128 MMHG | TEMPERATURE: 98.8 F | BODY MASS INDEX: 41.07 KG/M2 | OXYGEN SATURATION: 96 % | HEART RATE: 92 BPM

## 2024-06-25 DIAGNOSIS — J45.40 MODERATE PERSISTENT ASTHMA WITHOUT COMPLICATION: Primary | ICD-10-CM

## 2024-06-25 DIAGNOSIS — E66.01 MORBID OBESITY WITH BMI OF 40.0-44.9, ADULT (HCC): ICD-10-CM

## 2024-06-25 PROCEDURE — 99213 OFFICE O/P EST LOW 20 MIN: CPT | Performed by: NURSE PRACTITIONER

## 2024-06-25 PROCEDURE — G8427 DOCREV CUR MEDS BY ELIG CLIN: HCPCS | Performed by: NURSE PRACTITIONER

## 2024-06-25 PROCEDURE — 4004F PT TOBACCO SCREEN RCVD TLK: CPT | Performed by: NURSE PRACTITIONER

## 2024-06-25 PROCEDURE — G8417 CALC BMI ABV UP PARAM F/U: HCPCS | Performed by: NURSE PRACTITIONER

## 2024-06-25 RX ORDER — NICOTINE 21 MG/24HR
1 PATCH, TRANSDERMAL 24 HOURS TRANSDERMAL EVERY 24 HOURS
COMMUNITY
Start: 2024-06-12

## 2024-06-25 RX ORDER — PREDNISONE 10 MG/1
TABLET ORAL
Qty: 30 TABLET | Refills: 0 | Status: SHIPPED | OUTPATIENT
Start: 2024-06-25 | End: 2024-07-05

## 2024-06-25 RX ORDER — AZITHROMYCIN 250 MG/1
TABLET, FILM COATED ORAL
Qty: 6 TABLET | Refills: 0 | Status: SHIPPED | OUTPATIENT
Start: 2024-06-25 | End: 2024-07-05

## 2024-06-25 ASSESSMENT — ENCOUNTER SYMPTOMS
EYES NEGATIVE: 1
WHEEZING: 0
COUGH: 0
RESPIRATORY NEGATIVE: 1
SHORTNESS OF BREATH: 0
ALLERGIC/IMMUNOLOGIC NEGATIVE: 1
GASTROINTESTINAL NEGATIVE: 1

## 2024-06-25 NOTE — PROGRESS NOTES
[place] [facility] Pulmonary Medicine and Critical Care    Patient: TEMO CARDENAS, 44 y.o.   : 1980    Pt of Dr. Cortez     Subjective     Chief Complaint   Patient presents with    Follow-up     2 month pulmonary with cxr, labs + mbs        HPI  Temo is here for follow up for her Asthma with recent MBS done with no aspiration issues seen.    Allergen lab testing done- WNL   Started on Trelegy 200 mcg daily last office appt - using as prescribed   Continue on Singulair 10 mg nightly   BMI 38  Followed with rheumatology for awhile hasn't seen in 2 years   Right knee some days   Started smoking again around 0.5 PPD  Following with Dr. Brooks for gi issues   A little more SOB and wheezing past couple days- requesting Zpak and prednisone today     Progress History:   Since last visit any new medical issues? No  New ER or hospital visits? No  Any new or changes in medicines? No  Using inhalers? Yes   Are they helpful? Yes     There is no immunization history on file for this patient.      Tobacco Use      Smoking status: Every Day        Packs/day: 1.50        Years: 1.5 packs/day for 28.5 years (42.7 ttl pk-yrs)        Types: Cigarettes        Start date: 1996      Smokeless tobacco: Never      Tobacco comments: 0.5 ppd 24 on patches       Past Medical hx   PMH:  Past Medical History:   Diagnosis Date    Airway obstruction     Back pain     chronic    Cystitis, interstitial     Esophageal ulcer     GERD (gastroesophageal reflux disease)     Headache(784.0)     Hyperlipidemia     Insulin resistance     Irritable bowel syndrome     Spondylolisthesis     Spondylolysis      SURGICAL HISTORY:  Past Surgical History:   Procedure Laterality Date    ABDOMINAL ADHESION SURGERY  2012    Dr. Guardado    ANKLE SURGERY Right 2011    COLONOSCOPY  ,     Dr. Brooks    CYST REMOVAL  2011    right ankle-Dr. Hernandez    HYSTERECTOMY (CERVIX STATUS UNKNOWN)      Dr. Guardado    OVARY REMOVAL

## 2024-07-08 ENCOUNTER — HOSPITAL ENCOUNTER (OUTPATIENT)
Dept: PHYSICAL THERAPY | Age: 44
Setting detail: THERAPIES SERIES
Discharge: HOME | End: 2024-07-08
Payer: COMMERCIAL

## 2024-07-08 PROCEDURE — 97162 PT EVAL MOD COMPLEX 30 MIN: CPT

## 2024-07-08 PROCEDURE — 97110 THERAPEUTIC EXERCISES: CPT

## 2024-07-08 NOTE — PROGRESS NOTES
** PLEASE SIGN, DATE AND TIME CERTIFICATION BELOW AND RETURN TO [facility] OUTPATIENT REHABILITATION (FAX #: [phone]).  ATTEST/CO-SIGN IF ACCESSING VIA [employer].  THANK YOU.**    I certify that I have examined the patient below and determined that Physical Medicine and Rehabilitation service is necessary and that I approve the established plan of care for up to 90 days or as specifically noted.  Attestation, signature or co-signature of physician indicates approval of certification requirements.    ________________________ ____________ __________  Physician Signature   Date   Time  [facility]  PHYSICAL THERAPY  [x] EVALUATION  [] DAILY NOTE (LAND) [] DAILY NOTE (AQUATIC ) [] PROGRESS NOTE [] DISCHARGE NOTE    [x] OUTPATIENT REHABILITATION CENTER [facility]   [] [facility]    [] [facility]   [] [facility]    Date: 2024  Patient Name:  Chelsey Juarez  : 1980  MRN: [id number]  CSN: [id number]    Referring Practitioner Fanny Munguia MD    Diagnosis Spondylosis without myelopathy or radiculopathy, thoracic region  Spinal stenosis, cervical region  Other cervical disc degeneration at C5-C6 level  Myalgia, other site  Spondylosis without myelopathy or radiculopathy, lumbar region                            Treatment Diagnosis M62.81 Generalized Muscle Weakness and M54.2, G89.29  Chronic Neck Pain  M54.6  Thoracic Pain  M54.59, G89.29  Chronic Lower Back Pain   Date of Evaluation 24    Additional Pertinent History Fibromyalgia, GERD, hyperlipidemia, IBS, spondylolisthesis, spondylolysis, interstitial cystitis, insulin resistance, hysterectomy (), shoulder arthroscopy (23), abdominal adhesion surgery (), R ankle surgery (), TBI 2023, osteopenia       Functional Outcome Measure Used Oswestry     Functional Outcome Score 35/50 (24)       Insurance: Primary: Payor: [facility] /  /  / ,   Secondary:

## 2024-07-09 NOTE — PROGRESS NOTES
[facility]  Therapy Contact Note      Date: 2024  Patient Name: Chelsey Juarez        MRN: [id number]    Account Number: [id number]  : 1980  (44 y.o.)  Gender: female         Set up [facility] for the following dates:    24 1500  24 1430  24 800    Patient did not need [facility] for 24 700 appointment.     Priya Dixon, Rehab Tech

## 2024-07-12 ENCOUNTER — HOSPITAL ENCOUNTER (OUTPATIENT)
Dept: PHYSICAL THERAPY | Age: 44
Setting detail: THERAPIES SERIES
End: 2024-07-12
Payer: COMMERCIAL

## 2024-07-12 NOTE — PROGRESS NOTES
Plan   - Download reviewed and discussed with Chelsey and myself today in the office   - She  was advised to continue current positive airway pressure therapy with above described pressure.   - She  advised to keep good compliance with current recommended pressure to get optimal results and clinical improvement  - Recommend 7-9 hours of sleep with PAP  - She was advised to call [employer] regarding supplies if needed.   -She call my office for earlier appointment if needed for worsening of sleep symptoms.   - She was instructed on weight loss  - Chelsey was educated about my impression and plan. Patient verbalizesunderstanding.  We will see Chelsey Juarez back in: 3 months with download    Information added by my medical assistant/LPN was reviewed today   Electronically signed by GOLDIE Hollingsworth CNP on 7/15/2024 at 10:45 AM      
home

## 2024-07-15 ENCOUNTER — OFFICE VISIT (OUTPATIENT)
Dept: PULMONOLOGY | Age: 44
End: 2024-07-15
Payer: COMMERCIAL

## 2024-07-15 VITALS
HEART RATE: 81 BPM | TEMPERATURE: 98.9 F | SYSTOLIC BLOOD PRESSURE: 128 MMHG | WEIGHT: 207 LBS | HEIGHT: 60 IN | BODY MASS INDEX: 40.64 KG/M2 | DIASTOLIC BLOOD PRESSURE: 80 MMHG | OXYGEN SATURATION: 97 %

## 2024-07-15 DIAGNOSIS — G47.33 OSA ON CPAP: Primary | ICD-10-CM

## 2024-07-15 DIAGNOSIS — E66.01 MORBID OBESITY WITH BMI OF 40.0-44.9, ADULT (HCC): ICD-10-CM

## 2024-07-15 PROCEDURE — 4004F PT TOBACCO SCREEN RCVD TLK: CPT | Performed by: NURSE PRACTITIONER

## 2024-07-15 PROCEDURE — G8427 DOCREV CUR MEDS BY ELIG CLIN: HCPCS | Performed by: NURSE PRACTITIONER

## 2024-07-15 PROCEDURE — G8417 CALC BMI ABV UP PARAM F/U: HCPCS | Performed by: NURSE PRACTITIONER

## 2024-07-15 PROCEDURE — 99213 OFFICE O/P EST LOW 20 MIN: CPT | Performed by: NURSE PRACTITIONER

## 2024-07-15 ASSESSMENT — ENCOUNTER SYMPTOMS
WHEEZING: 0
EYES NEGATIVE: 1
COUGH: 0
SHORTNESS OF BREATH: 0
GASTROINTESTINAL NEGATIVE: 1
RESPIRATORY NEGATIVE: 1
BACK PAIN: 1

## 2024-07-16 ENCOUNTER — HOSPITAL ENCOUNTER (OUTPATIENT)
Dept: PHYSICAL THERAPY | Age: 44
Setting detail: THERAPIES SERIES
Discharge: HOME | End: 2024-07-16
Payer: COMMERCIAL

## 2024-07-16 PROCEDURE — 97110 THERAPEUTIC EXERCISES: CPT

## 2024-07-16 PROCEDURE — 97140 MANUAL THERAPY 1/> REGIONS: CPT

## 2024-07-16 NOTE — PROGRESS NOTES
[facility]  PHYSICAL THERAPY  [] EVALUATION  [x] DAILY NOTE (LAND) [] DAILY NOTE (AQUATIC ) [] PROGRESS NOTE [] DISCHARGE NOTE    [x] OUTPATIENT REHABILITATION CENTER [facility]   [] [facility]    [] [facility][1]   [] [facility]    Date: 2024  Patient Name:  Chelsey Juarez  : 1980  MRN: [id number]  CSN: [id number]    Referring Practitioner Fanny Munguia MD    Diagnosis Spondylosis without myelopathy or radiculopathy, thoracic region  Spinal stenosis, cervical region  Other cervical disc degeneration at C5-C6 level  Myalgia, other site  Spondylosis without myelopathy or radiculopathy, lumbar region                            Treatment Diagnosis M62.81 Generalized Muscle Weakness and M54.2, G89.29  Chronic Neck Pain  M54.6  Thoracic Pain  M54.59, G89.29  Chronic Lower Back Pain   Date of Evaluation 24    Additional Pertinent History Fibromyalgia, GERD, hyperlipidemia, IBS, spondylolisthesis, spondylolysis, interstitial cystitis, insulin resistance, hysterectomy (), shoulder arthroscopy (23), abdominal adhesion surgery (), R ankle surgery (), TBI 2023, osteopenia       Functional Outcome Measure Used Oswestry     Functional Outcome Score 35/50 (24)       Insurance: Primary: Payor: [facility] /  /  / ,   Secondary:    Authorization Information: PRECERTIFICATION REQUIRED: No  INSURANCE THERAPY BENEFIT: No pre-certification is needed.  PT, OT and ST are allowed 30 visits each per calendar year.  AQUATIC THERAPY COVERED:  Yes   MODALITIES COVERED:  Yes with the exception of Iontophoresis and Hot/Cold Packs.  TELEHEALTH COVERED:  Yes   Approved Procedure Codes: Authorization of Specific CPT Codes Not Required  (Codes requested indicated by red font, codes approved indicated by black font)   Visit # 2, 2/10 for progress note (Reporting Period: 24 to     )   Visits Allowed: 30    Recertification

## 2024-07-18 ENCOUNTER — HOSPITAL ENCOUNTER (OUTPATIENT)
Dept: PHYSICAL THERAPY | Age: 44
Setting detail: THERAPIES SERIES
Discharge: HOME | End: 2024-07-18
Payer: COMMERCIAL

## 2024-07-18 PROCEDURE — 97140 MANUAL THERAPY 1/> REGIONS: CPT

## 2024-07-18 PROCEDURE — 97110 THERAPEUTIC EXERCISES: CPT

## 2024-07-18 NOTE — PROGRESS NOTES
[facility]  PHYSICAL THERAPY  [] EVALUATION  [x] DAILY NOTE (LAND) [] DAILY NOTE (AQUATIC ) [] PROGRESS NOTE [] DISCHARGE NOTE    [x] OUTPATIENT REHABILITATION CENTER [facility]   [] [place] [facility]    [] [facility]   [] [facility]    Date: 2024  Patient Name:  Chelsey Juarez  : 1980  MRN: [id number]  CSN: [id number]    Referring Practitioner Fanny Munguia MD    Diagnosis Spondylosis without myelopathy or radiculopathy, thoracic region  Spinal stenosis, cervical region  Other cervical disc degeneration at C5-C6 level  Myalgia, other site  Spondylosis without myelopathy or radiculopathy, lumbar region                            Treatment Diagnosis M62.81 Generalized Muscle Weakness and M54.2, G89.29  Chronic Neck Pain  M54.6  Thoracic Pain  M54.59, G89.29  Chronic Lower Back Pain   Date of Evaluation 24    Additional Pertinent History Fibromyalgia, GERD, hyperlipidemia, IBS, spondylolisthesis, spondylolysis, interstitial cystitis, insulin resistance, hysterectomy (), shoulder arthroscopy (23), abdominal adhesion surgery (), R ankle surgery (), TBI 2023, osteopenia       Functional Outcome Measure Used Oswestry     Functional Outcome Score 35/50 (24)       Insurance: Primary: Payor: [facility] /  /  / ,   Secondary:    Authorization Information: PRECERTIFICATION REQUIRED: No  INSURANCE THERAPY BENEFIT: No pre-certification is needed.  PT, OT and ST are allowed 30 visits each per calendar year.  AQUATIC THERAPY COVERED:  Yes   MODALITIES COVERED:  Yes with the exception of Iontophoresis and Hot/Cold Packs.  TELEHEALTH COVERED:  Yes   Approved Procedure Codes: Authorization of Specific CPT Codes Not Required  (Codes requested indicated by red font, codes approved indicated by black font)   Visit # 3, 3/10 for progress note (Reporting Period: 24 to     )   Visits Allowed: 30    Recertification

## 2024-07-22 ENCOUNTER — HOSPITAL ENCOUNTER (OUTPATIENT)
Dept: PHYSICAL THERAPY | Age: 44
Setting detail: THERAPIES SERIES
Discharge: HOME | End: 2024-07-22
Payer: COMMERCIAL

## 2024-07-22 ENCOUNTER — APPOINTMENT (OUTPATIENT)
Dept: GENERAL RADIOLOGY | Age: 44
End: 2024-07-22
Payer: COMMERCIAL

## 2024-07-22 ENCOUNTER — HOSPITAL ENCOUNTER (EMERGENCY)
Age: 44
Discharge: HOME | End: 2024-07-22
Payer: COMMERCIAL

## 2024-07-22 VITALS
DIASTOLIC BLOOD PRESSURE: 102 MMHG | HEART RATE: 74 BPM | RESPIRATION RATE: 18 BRPM | SYSTOLIC BLOOD PRESSURE: 133 MMHG | OXYGEN SATURATION: 97 %

## 2024-07-22 DIAGNOSIS — S93.402A SPRAIN OF LEFT ANKLE, UNSPECIFIED LIGAMENT, INITIAL ENCOUNTER: Primary | ICD-10-CM

## 2024-07-22 PROCEDURE — 6360000002 HC RX W HCPCS: Performed by: PHYSICIAN ASSISTANT

## 2024-07-22 PROCEDURE — 97140 MANUAL THERAPY 1/> REGIONS: CPT

## 2024-07-22 PROCEDURE — 99283 EMERGENCY DEPT VISIT LOW MDM: CPT

## 2024-07-22 PROCEDURE — 97110 THERAPEUTIC EXERCISES: CPT

## 2024-07-22 PROCEDURE — 73610 X-RAY EXAM OF ANKLE: CPT

## 2024-07-22 RX ORDER — DEXAMETHASONE 4 MG/1
4 TABLET ORAL ONCE
Status: COMPLETED | OUTPATIENT
Start: 2024-07-22 | End: 2024-07-22

## 2024-07-22 RX ADMIN — DEXAMETHASONE 4 MG: 4 TABLET ORAL at 10:46
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
## 2024-07-22 NOTE — ED NOTES
Patient to ED for right ankle and foot pain. Patient reports injuring foot back in may which has continued to worsen

## 2024-07-26 ENCOUNTER — HOSPITAL ENCOUNTER (OUTPATIENT)
Dept: PHYSICAL THERAPY | Age: 44
Setting detail: THERAPIES SERIES
End: 2024-07-26
Payer: COMMERCIAL

## 2024-07-30 ENCOUNTER — HOSPITAL ENCOUNTER (OUTPATIENT)
Dept: PHYSICAL THERAPY | Age: 44
Setting detail: THERAPIES SERIES
Discharge: HOME | End: 2024-07-30
Payer: COMMERCIAL

## 2024-07-30 PROCEDURE — 97110 THERAPEUTIC EXERCISES: CPT

## 2024-07-30 NOTE — PROGRESS NOTES
be indep with HEP in order to prevent re-injury and improve ability to perform functional mobility tasks.     Patient Education:   [x]  HEP/Education Completed: body mechanics, updated HEP, and monitoring treatment tolerance   [employer] Access Code: [email or web address]   []  No new Education completed  []  Reviewed Prior HEP      [x]  Patient verbalized and/or demonstrated understanding of education provided.  []  Patient unable to verbalize and/or demonstrate understanding of education provided.  Will continue education.  []  Barriers to learning:     PLAN:  Treatment Recommendations: Strengthening, Range of Motion, Balance Training, Neuromuscular Re-education, Manual Therapy - Soft Tissue Mobilization, Manual Therapy - Joint Manipulation, Pain Management, Home Exercise Program, Patient Education, Integrative Dry Needling, Aquatics, and Modalities    []  Plan of care initiated.  Plan to see patient 2 times per week for 8 weeks to address the treatment planned outlined above.  [x]  Continue with current plan of care  []  Modify plan of care as follows:    []  Hold pending physician visit  []  Discharge    Time In 1353   Time Out 1440   Timed Code Minutes: 31   Total Treatment Time: 47     Electronically Signed by: Valerio Stokes PT

## 2024-08-01 ENCOUNTER — HOSPITAL ENCOUNTER (OUTPATIENT)
Dept: PHYSICAL THERAPY | Age: 44
Setting detail: THERAPIES SERIES
Discharge: HOME | End: 2024-08-01
Payer: COMMERCIAL

## 2024-08-01 PROCEDURE — 97113 AQUATIC THERAPY/EXERCISES: CPT

## 2024-08-01 NOTE — PROGRESS NOTES
[facility]  Therapy Contact Note      Date: 2024  Patient Name: Chelsey Juarez        MRN: [id number]    Account Number: [id number]  : 1980  (44 y.o.)  Gender: female         Set up [facility] for transportation for 24 845. Patient's voicemail was full. Therapist will notify patient at today's appointment.     Priya Dixon, Rehab Tech

## 2024-08-01 NOTE — PROGRESS NOTES
[facility]  PHYSICAL THERAPY  [] EVALUATION  [x] DAILY NOTE (LAND) [] DAILY NOTE (AQUATIC ) [] PROGRESS NOTE [] DISCHARGE NOTE    [x] OUTPATIENT REHABILITATION CENTER [facility]   [] [place] [facility]    [] [facility]   [facility]    Date: 2024  Patient Name:  Chelsey Juarez  : 1980  MRN: [id number]  CSN: [id number]    Referring Practitioner Fanny Munguia MD    Diagnosis Spondylosis without myelopathy or radiculopathy, thoracic region  Spinal stenosis, cervical region  Other cervical disc degeneration at C5-C6 level  Myalgia, other site  Spondylosis without myelopathy or radiculopathy, lumbar region                            Treatment Diagnosis M62.81 Generalized Muscle Weakness and M54.2, G89.29  Chronic Neck Pain  M54.6  Thoracic Pain  M54.59, G89.29  Chronic Lower Back Pain   Date of Evaluation 24    Additional Pertinent History Fibromyalgia, GERD, hyperlipidemia, IBS, spondylolisthesis, spondylolysis, interstitial cystitis, insulin resistance, hysterectomy (), shoulder arthroscopy (23), abdominal adhesion surgery (), R ankle surgery (), TBI 2023, osteopenia       Functional Outcome Measure Used Oswestry     Functional Outcome Score 35/50 (24)       Insurance: Primary: Payor: [facility] /  /  / ,   Secondary:    Authorization Information: PRECERTIFICATION REQUIRED: No  INSURANCE THERAPY BENEFIT: No pre-certification is needed.  PT, OT and ST are allowed 30 visits each per calendar year.  AQUATIC THERAPY COVERED:  Yes   MODALITIES COVERED:  Yes with the exception of Iontophoresis and Hot/Cold Packs.  TELEHEALTH COVERED:  Yes   Approved Procedure Codes: Authorization of Specific CPT Codes Not Required  (Codes requested indicated by red font, codes approved indicated by black font)   Visit # 5, /10 for progress note (Reporting Period: 24 to     )   Visits Allowed: 30    Recertification

## 2024-08-05 ENCOUNTER — HOSPITAL ENCOUNTER (OUTPATIENT)
Dept: PHYSICAL THERAPY | Age: 44
Setting detail: THERAPIES SERIES
Discharge: HOME | End: 2024-08-05
Payer: COMMERCIAL

## 2024-08-05 PROCEDURE — 97110 THERAPEUTIC EXERCISES: CPT

## 2024-08-05 NOTE — PROGRESS NOTES
[facility]  PHYSICAL THERAPY  [] EVALUATION  [x] DAILY NOTE (LAND) [] DAILY NOTE (AQUATIC ) [] PROGRESS NOTE [] DISCHARGE NOTE    [x] OUTPATIENT REHABILITATION CENTER [facility]   [] [place] [facility]    [] [facility]   [facility]    Date: 2024  Patient Name:  Chelsey Juarez  : 1980  MRN: [id number]  CSN: [id number]    Referring Practitioner Fanny Munguia MD    Diagnosis Spondylosis without myelopathy or radiculopathy, thoracic region  Spinal stenosis, cervical region  Other cervical disc degeneration at C5-C6 level  Myalgia, other site  Spondylosis without myelopathy or radiculopathy, lumbar region                            Treatment Diagnosis M62.81 Generalized Muscle Weakness and M54.2, G89.29  Chronic Neck Pain  M54.6  Thoracic Pain  M54.59, G89.29  Chronic Lower Back Pain   Date of Evaluation 24    Additional Pertinent History Fibromyalgia, GERD, hyperlipidemia, IBS, spondylolisthesis, spondylolysis, interstitial cystitis, insulin resistance, hysterectomy (), shoulder arthroscopy (23), abdominal adhesion surgery (), R ankle surgery (), TBI 2023, osteopenia       Functional Outcome Measure Used Oswestry     Functional Outcome Score 35/50 (24)       Insurance: Primary: Payor: [facility] /  /  / ,   Secondary:    Authorization Information: PRECERTIFICATION REQUIRED: No  INSURANCE THERAPY BENEFIT: No pre-certification is needed.  PT, OT and ST are allowed 30 visits each per calendar year.  AQUATIC THERAPY COVERED:  Yes   MODALITIES COVERED:  Yes with the exception of Iontophoresis and Hot/Cold Packs.  TELEHEALTH COVERED:  Yes   Approved Procedure Codes: Authorization of Specific CPT Codes Not Required  (Codes requested indicated by red font, codes approved indicated by black font)   Visit # 6, 6/10 for progress note (Reporting Period: 24 to     )   Visits Allowed: 30    Recertification

## 2024-08-06 ENCOUNTER — HOSPITAL ENCOUNTER (OUTPATIENT)
Dept: PHYSICAL THERAPY | Age: 44
Setting detail: THERAPIES SERIES
Discharge: HOME | End: 2024-08-06
Payer: COMMERCIAL

## 2024-08-06 PROCEDURE — 97162 PT EVAL MOD COMPLEX 30 MIN: CPT

## 2024-08-06 NOTE — PROGRESS NOTES
** PLEASE SIGN, DATE AND TIME CERTIFICATION BELOW AND RETURN TO [facility] OUTPATIENT REHABILITATION (FAX #: [phone]).  ATTEST/CO-SIGN IF ACCESSING VIA [employer].  THANK YOU.**    I certify that I have examined the patient below and determined that Physical Medicine and Rehabilitation service is necessary and that I approve the established plan of care for up to 90 days or as specifically noted.  Attestation, signature or co-signature of physician indicates approval of certification requirements.    ________________________ ____________ __________  Physician Signature   Date   Time      [facility]  PHYSICAL THERAPY  [x] EVALUATION  [] DAILY NOTE (LAND) [] DAILY NOTE (AQUATIC ) [] PROGRESS NOTE [] DISCHARGE NOTE    [x] OUTPATIENT REHABILITATION [facility]   [] [facility]    [] [facility]   [] [facility]    Date: 2024  Patient Name:  Chelsey Juarez  : 1980  MRN: [id number]  CSN: [id number]    Referring Practitioner Dylon Harper DPM    Diagnosis Achilles tendinitis, left leg   Treatment Diagnosis M25.571 Pain in right ankle   Date of Evaluation 24    Additional Pertinent History Back pain.  IBS.  Right ankle surgery .        Functional Outcome Measure Used LEFS   Functional Outcome Score 25 (24)      Insurance: Primary: Payor: [facility] /  /  / ,   Secondary:    Authorization Information: PRECERTIFICATION REQUIRED: No  INSURANCE THERAPY BENEFIT: No pre-certification is needed.  PT, OT and ST are allowed 30 visits each per calendar year.  AQUATIC THERAPY COVERED:  Yes   MODALITIES COVERED:  Yes with the exception of Iontophoresis and Hot/Cold Packs.  TELEHEALTH COVERED:  Yes  REFERENCE #: [id number]   Approved Procedure Codes: Authorization of Specific CPT Codes Not Required  (Codes requested indicated by red font, codes approved indicated by black font)   Visit # 1, 1/10 for progress note (Reporting

## 2024-08-09 ENCOUNTER — HOSPITAL ENCOUNTER (OUTPATIENT)
Dept: PHYSICAL THERAPY | Age: 44
Setting detail: THERAPIES SERIES
Discharge: HOME | End: 2024-08-09
Payer: COMMERCIAL

## 2024-08-09 PROCEDURE — 97113 AQUATIC THERAPY/EXERCISES: CPT

## 2024-08-09 NOTE — PROGRESS NOTES
tolerated.     GOALS:  Patient Goal: lift heavier boxes at work    Short Term Goals:  Time Frame: 4 weeks    1. Patient will report decrease in pain to 3/10 at most to promote ease of completing household tasks.  2. Patient will improve cervical extension AROM to 50 degrees to allow decreased pain with head movements.  3. Patient will improve L LE hip flexion and ankle DF/PF strength to 4+/5 to improve ability to ambulate with a normalized gait pattern.  4. Patient will be indep with HEP in order to meet long term goals.  5. Pt will have good awareness of correct posture and exercises to help maintain overall good UE and cervical posture.     Long Term Goals:  Time Frame: 5 weeks     1. Patient will improve Modified Oswestry score from 35/50 to 22/50 to allow decrease in disability and improved functional mobility for improved overall QOL.  2. Patient will be indep with HEP in order to prevent re-injury and improve ability to perform functional mobility tasks.     Patient Education:   [x]  HEP/Education Completed: body mechanics, updated HEP, and monitoring treatment tolerance   [employer] Access Code: [email or web address]   []  No new Education completed  []  Reviewed Prior HEP      [x]  Patient verbalized and/or demonstrated understanding of education provided.  []  Patient unable to verbalize and/or demonstrate understanding of education provided.  Will continue education.  []  Barriers to learning:     PLAN:  Treatment Recommendations: Strengthening, Range of Motion, Balance Training, Neuromuscular Re-education, Manual Therapy - Soft Tissue Mobilization, Manual Therapy - Joint Manipulation, Pain Management, Home Exercise Program, Patient Education, Integrative Dry Needling, Aquatics, and Modalities    []  Plan of care initiated.  Plan to see patient 2 times per week for 8 weeks to address the treatment planned outlined above.  [x]  Continue with current plan of care  []  Modify plan of care as follows:    []  Hold pending

## 2024-08-12 ENCOUNTER — HOSPITAL ENCOUNTER (OUTPATIENT)
Dept: PHYSICAL THERAPY | Age: 44
Setting detail: THERAPIES SERIES
Discharge: HOME | End: 2024-08-12
Payer: COMMERCIAL

## 2024-08-12 PROCEDURE — 97110 THERAPEUTIC EXERCISES: CPT

## 2024-08-12 NOTE — PROGRESS NOTES
** PLEASE SIGN, DATE AND TIME CERTIFICATION BELOW AND RETURN TO [facility] OUTPATIENT REHABILITATION (FAX #: [phone]).  ATTEST/CO-SIGN IF ACCESSING VIA [employer].  THANK YOU.**    I certify that I have examined the patient below and determined that Physical Medicine and Rehabilitation service is necessary and that I approve the established plan of care for up to 90 days or as specifically noted.  Attestation, signature or co-signature of physician indicates approval of certification requirements.    ________________________ ____________ __________  Physician Signature   Date   Time    [facility]  PHYSICAL THERAPY  [] EVALUATION  [] DAILY NOTE (LAND) [] DAILY NOTE (AQUATIC ) [x] PROGRESS NOTE [] DISCHARGE NOTE    [x] OUTPATIENT REHABILITATION CENTER [facility]   [] [facility]    [] [facility]   [] [facility]    Date: 2024  Patient Name:  Chelsey Juarez  : 1980  MRN: [id number]  CSN: [id number]    Referring Practitioner Fanny Munguia MD    Diagnosis Spondylosis without myelopathy or radiculopathy, thoracic region  Spinal stenosis, cervical region  Other cervical disc degeneration at C5-C6 level  Myalgia, other site  Spondylosis without myelopathy or radiculopathy, lumbar region                            Treatment Diagnosis M62.81 Generalized Muscle Weakness and M54.2, G89.29  Chronic Neck Pain  M54.6  Thoracic Pain  M54.59, G89.29  Chronic Lower Back Pain   Date of Evaluation 24    Additional Pertinent History Fibromyalgia, GERD, hyperlipidemia, IBS, spondylolisthesis, spondylolysis, interstitial cystitis, insulin resistance, hysterectomy (), shoulder arthroscopy (23), abdominal adhesion surgery (), R ankle surgery (), TBI 2023, osteopenia       Functional Outcome Measure Used Oswestry     Functional Outcome Score 35/50 (24) , 34/50 (24)       Insurance: Primary: Payor: [facility]

## 2024-08-14 ENCOUNTER — HOSPITAL ENCOUNTER (OUTPATIENT)
Dept: PHYSICAL THERAPY | Age: 44
Setting detail: THERAPIES SERIES
Discharge: HOME | End: 2024-08-14
Payer: COMMERCIAL

## 2024-08-14 PROCEDURE — 97110 THERAPEUTIC EXERCISES: CPT

## 2024-08-14 PROCEDURE — 97140 MANUAL THERAPY 1/> REGIONS: CPT

## 2024-08-14 NOTE — PROGRESS NOTES
Dry needling manual therapy: needles were inserted, pistoned, rotated, and/or coned to produce intramuscular mobilization. These techniques were used to restore functional range of motion, reduce muscle spasm and induce healing in the corresponding musculature. (96551).  Clean Technique was utilized today while applying Dry needling treatment. The treatment sites were cleaned with 70% solution of isopropyl alcohol. The therapist washed/sanitized their hands and utilized treatment gloves prior to inserting the needles.  All needles were removed and discarded in the appropriate sharps container.\"        GOALS:  Patient Goal: pain relief    Short Term Goals:  Time Frame: 1 week  Pt to initiate a HEP adding to the ankle alphabet taught at initial PT evaluation.        Long Term Goals:  Time Frame: 12 weeks  Patient to score between 61 and 80 on the LEFS to show improved functional ability.   Patient to improve left ankle AROM to WNL to improve gait.   Patient to be independent with a land based (and possibly an aquatic based) exercise program for ankle strengthening.   Patient to attain at least a 50% decrease in left ankle pain with gait and functional activities.        Patient Education:   [x]  HEP/Education Completed: body mechanics, wear of ASO, footwear selection,   [employer] Access Code: [email or web address]   []  No new Education completed  []  Reviewed Prior HEP      []  Patient verbalized and/or demonstrated understanding of education provided.  []  Patient unable to verbalize and/or demonstrate understanding of education provided.  Will continue education.  [x]  Barriers to learning: none    PLAN:  Treatment Recommendations: Strengthening, Range of Motion, Balance Training, Functional Mobility Training, Transfer Training, Endurance Training, Gait Training, Stair Training, Neuromuscular Re-education, Manual Therapy - Soft Tissue Mobilization, Pain Management, Home Exercise Program, Patient Education, Safety Education

## 2024-08-20 ENCOUNTER — HOSPITAL ENCOUNTER (OUTPATIENT)
Dept: PHYSICAL THERAPY | Age: 44
Setting detail: THERAPIES SERIES
Discharge: HOME | End: 2024-08-20
Payer: COMMERCIAL

## 2024-08-20 PROCEDURE — 97110 THERAPEUTIC EXERCISES: CPT

## 2024-08-20 NOTE — PROGRESS NOTES
GOAL NOT MET:  .  Continue Goal  5. Pt will have good awareness of correct posture and exercises to help maintain overall good UE and cervical posture. GOAL NOT MET: Progressing towards goal.  Continue Goal    Long Term Goals:  Time Frame: 5 weeks     1. Patient will improve Modified Oswestry score from 35/50 to 22/50 to allow decrease in disability and improved functional mobility for improved overall QOL. GOAL NOT MET: 34/50.  Continue Goal  2. Patient will be indep with HEP in order to prevent re-injury and improve ability to perform functional mobility tasks. GOAL NOT MET: Progressing towards goal.  Continue Goal    Patient Education:   [x]  HEP/Education Completed: body mechanics   [facility]: [email or web address]   []  No new Education completed  []  Reviewed Prior HEP      [x]  Patient verbalized and/or demonstrated understanding of education provided.  []  Patient unable to verbalize and/or demonstrate understanding of education provided.  Will continue education.  []  Barriers to learning:     PLAN:  Treatment Recommendations: Strengthening, Range of Motion, Balance Training, Neuromuscular Re-education, Manual Therapy - Soft Tissue Mobilization, Manual Therapy - Joint Manipulation, Pain Management, Home Exercise Program, Patient Education, Integrative Dry Needling, Aquatics, and Modalities    []  Plan of care initiated.  Plan to see patient 2 times per week for 8 weeks to address the treatment planned outlined above.  []  Continue with current plan of care  [x]  Modify plan of care as follows: 1x/week for 4-8 weeks   []  Hold pending physician visit  []  Discharge    Time In 1524   Time Out 1614   Timed Code Minutes: 30   Total Treatment Time: 50     Electronically Signed by: Valerio Stokes, PT

## 2024-08-21 ENCOUNTER — HOSPITAL ENCOUNTER (OUTPATIENT)
Dept: PHYSICAL THERAPY | Age: 44
Setting detail: THERAPIES SERIES
Discharge: HOME | End: 2024-08-21
Payer: COMMERCIAL

## 2024-08-21 PROCEDURE — 97110 THERAPEUTIC EXERCISES: CPT

## 2024-08-21 PROCEDURE — 97140 MANUAL THERAPY 1/> REGIONS: CPT

## 2024-08-21 NOTE — PROGRESS NOTES
[facility]  PHYSICAL THERAPY  [] EVALUATION  [x] DAILY NOTE (LAND) [] DAILY NOTE (AQUATIC ) [] PROGRESS NOTE [] DISCHARGE NOTE    [x] OUTPATIENT REHABILITATION CENTER [facility]   [] [facility]    [] [facility]   [] [facility]    Date: 2024  Patient Name:  Chelsey Juarez  : 1980  MRN: [id number]  CSN: [id number]    Referring Practitioner Dylon Harper DPM    Diagnosis Achilles tendinitis, left leg   Treatment Diagnosis M25.571 Pain in right ankle   Date of Evaluation 24    Additional Pertinent History Back pain.  IBS.  Right ankle surgery .        Functional Outcome Measure Used LEFS   Functional Outcome Score 25 (24)      Insurance: Primary: Payor: [facility] /  /  / ,   Secondary:    Authorization Information: PRECERTIFICATION REQUIRED: No  INSURANCE THERAPY BENEFIT: No pre-certification is needed.  PT, OT and ST are allowed 30 visits each per calendar year.  AQUATIC THERAPY COVERED:  Yes   MODALITIES COVERED:  Yes with the exception of Iontophoresis and Hot/Cold Packs.  TELEHEALTH COVERED:  Yes  REFERENCE #: [id number]   Approved Procedure Codes: Authorization of Specific CPT Codes Not Required  (Codes requested indicated by red font, codes approved indicated by black font)   Visit # 3, 3/10 for progress note (Reporting Period: 24 to 24)   Visits Allowed: Unlimited per medical necessity   Recertification Date: 24   Physician Follow-Up: Per patient   Physician Orders:    History of Present Illness: Chelsey states she has constant left ankle pain 6/10 and c/o LEFT drop foot and swelling.  States that besides right sciatica, she has no major problems in her right side and that her LEFT side is the most impacted.  \"Mobility wise, this is tough.  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

## 2024-08-26 ENCOUNTER — HOSPITAL ENCOUNTER (OUTPATIENT)
Dept: PHYSICAL THERAPY | Age: 44
Setting detail: THERAPIES SERIES
Discharge: HOME | End: 2024-08-26
Payer: COMMERCIAL

## 2024-08-26 PROCEDURE — 97110 THERAPEUTIC EXERCISES: CPT

## 2024-08-26 NOTE — PROGRESS NOTES
[facility]  PHYSICAL THERAPY  [] EVALUATION  [x] DAILY NOTE (LAND) [] DAILY NOTE (AQUATIC ) [] PROGRESS NOTE [] DISCHARGE NOTE    [x] OUTPATIENT REHABILITATION CENTER [facility]   [] [facility]    [] [facility]   [] [facility]    Date: 2024  Patient Name:  Chelsey Juarez  : 1980  MRN: [id number]  CSN: [id number]    Referring Practitioner Fanny Munguia MD    Diagnosis Spondylosis without myelopathy or radiculopathy, thoracic region  Spinal stenosis, cervical region  Other cervical disc degeneration at C5-C6 level  Myalgia, other site  Spondylosis without myelopathy or radiculopathy, lumbar region                            Treatment Diagnosis M62.81 Generalized Muscle Weakness and M54.2, G89.29  Chronic Neck Pain  M54.6  Thoracic Pain  M54.59, G89.29  Chronic Lower Back Pain   Date of Evaluation 24    Additional Pertinent History Fibromyalgia, GERD, hyperlipidemia, IBS, spondylolisthesis, spondylolysis, interstitial cystitis, insulin resistance, hysterectomy (), shoulder arthroscopy (23), abdominal adhesion surgery (), R ankle surgery (), TBI 2023, osteopenia       Functional Outcome Measure Used Oswestry     Functional Outcome Score 35/50 (24) , 34/50 (24)       Insurance: Primary: Payor: [facility] /  /  / ,   Secondary:    Authorization Information: PRECERTIFICATION REQUIRED: No  INSURANCE THERAPY BENEFIT: No pre-certification is needed.  PT, OT and ST are allowed 30 visits each per calendar year.  AQUATIC THERAPY COVERED:  Yes   MODALITIES COVERED:  Yes with the exception of Iontophoresis and Hot/Cold Packs.  TELEHEALTH COVERED:  Yes   Approved Procedure Codes: Authorization of Specific CPT Codes Not Required  (Codes requested indicated by red font, codes approved indicated by black font)   Visit # 10, 2/10 for progress note (Reporting Period: 24 to  **   )   Visits Allowed:

## 2024-08-27 ENCOUNTER — HOSPITAL ENCOUNTER (OUTPATIENT)
Dept: PHYSICAL THERAPY | Age: 44
Setting detail: THERAPIES SERIES
Discharge: HOME | End: 2024-08-27
Payer: COMMERCIAL

## 2024-08-27 PROCEDURE — 97110 THERAPEUTIC EXERCISES: CPT

## 2024-08-27 PROCEDURE — 97140 MANUAL THERAPY 1/> REGIONS: CPT

## 2024-08-27 NOTE — PROGRESS NOTES
[facility]  PHYSICAL THERAPY  [] EVALUATION  [x] DAILY NOTE (LAND) [] DAILY NOTE (AQUATIC ) [] PROGRESS NOTE [] DISCHARGE NOTE    [x] OUTPATIENT REHABILITATION CENTER [facility]   [] [facility]    [] [facility]   [] [facility]    Date: 2024  Patient Name:  Chelsey Juarez  : 1980  MRN: [id number]  CSN: [id number]    Referring Practitioner Dylon Harper DPM    Diagnosis Achilles tendinitis, left leg   Treatment Diagnosis M25.571 Pain in right ankle   Date of Evaluation 24    Additional Pertinent History Back pain.  IBS.  Right ankle surgery .        Functional Outcome Measure Used LEFS   Functional Outcome Score 25 (24)      Insurance: Primary: Payor: [facility] /  /  / ,   Secondary:    Authorization Information: PRECERTIFICATION REQUIRED: No  INSURANCE THERAPY BENEFIT: No pre-certification is needed.  PT, OT and ST are allowed 30 visits each per calendar year.  AQUATIC THERAPY COVERED:  Yes   MODALITIES COVERED:  Yes with the exception of Iontophoresis and Hot/Cold Packs.  TELEHEALTH COVERED:  Yes  REFERENCE #: [id number]   Approved Procedure Codes: Authorization of Specific CPT Codes Not Required  (Codes requested indicated by red font, codes approved indicated by black font)   Visit # 4, 4/10 for progress note (Reporting Period: 24 to 24)   Visits Allowed: 0 visits each per calendar year. (Also in PT for Cervical/Back)    Recertification Date: 24   Physician Follow-Up: Per patient   Physician Orders:    History of Present Illness: Chelsey states she has constant left ankle pain 6/10 and c/o LEFT drop foot and swelling.  States that besides right sciatica, she has no major problems in her right side and that her LEFT side is the most impacted.  \"Mobility wise, this is tough.  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 
Abnormal Lactate: > 2

## 2024-09-03 ENCOUNTER — APPOINTMENT (OUTPATIENT)
Dept: PHYSICAL THERAPY | Age: 44
End: 2024-09-03
Payer: COMMERCIAL

## 2024-09-03 ENCOUNTER — HOSPITAL ENCOUNTER (OUTPATIENT)
Dept: PHYSICAL THERAPY | Age: 44
Setting detail: THERAPIES SERIES
End: 2024-09-03
Payer: COMMERCIAL

## 2024-09-05 ENCOUNTER — OFFICE VISIT (OUTPATIENT)
Dept: UROLOGY | Age: 44
End: 2024-09-05
Payer: COMMERCIAL

## 2024-09-05 VITALS — BODY MASS INDEX: 40.25 KG/M2 | WEIGHT: 205 LBS | HEIGHT: 60 IN | RESPIRATION RATE: 18 BRPM

## 2024-09-05 DIAGNOSIS — R30.0 DYSURIA: Primary | ICD-10-CM

## 2024-09-05 LAB
BILIRUBIN, URINE: NEGATIVE
BLOOD URINE, POC: NEGATIVE
CHARACTER, URINE: CLEAR
COLOR, UA: YELLOW
GLUCOSE URINE: NEGATIVE MG/DL
KETONES, URINE: NEGATIVE
LEUKOCYTE CLUMPS, URINE: NEGATIVE
NITRITE, URINE: NEGATIVE
PH, URINE: 6 (ref 5–9)
PROTEIN, URINE: NEGATIVE MG/DL
SPECIFIC GRAVITY UA: 1.02 (ref 1–1.03)
UROBILINOGEN, URINE: 0.2 EU/DL (ref 0–1)

## 2024-09-05 PROCEDURE — G8417 CALC BMI ABV UP PARAM F/U: HCPCS

## 2024-09-05 PROCEDURE — 81003 URINALYSIS AUTO W/O SCOPE: CPT

## 2024-09-05 PROCEDURE — 99214 OFFICE O/P EST MOD 30 MIN: CPT

## 2024-09-05 PROCEDURE — G8427 DOCREV CUR MEDS BY ELIG CLIN: HCPCS

## 2024-09-05 PROCEDURE — 4004F PT TOBACCO SCREEN RCVD TLK: CPT

## 2024-09-05 RX ORDER — OXYBUTYNIN CHLORIDE 5 MG/1
5 TABLET ORAL 3 TIMES DAILY PRN
Qty: 30 TABLET | Refills: 0 | Status: SHIPPED | OUTPATIENT
Start: 2024-09-05

## 2024-09-05 RX ORDER — AZITHROMYCIN 250 MG/1
250 TABLET, FILM COATED ORAL DAILY
COMMUNITY
Start: 2024-09-03

## 2024-09-05 RX ORDER — PHENAZOPYRIDINE HYDROCHLORIDE 200 MG/1
200 TABLET, FILM COATED ORAL 3 TIMES DAILY PRN
Qty: 9 TABLET | Refills: 0 | Status: SHIPPED | OUTPATIENT
Start: 2024-09-05 | End: 2024-09-08

## 2024-09-05 RX ORDER — AMITRIPTYLINE HYDROCHLORIDE 10 MG/1
10 TABLET ORAL NIGHTLY
Qty: 30 TABLET | Refills: 0 | Status: SHIPPED | OUTPATIENT
Start: 2024-09-05 | End: 2024-10-05

## 2024-09-05 NOTE — PROGRESS NOTES
current drug use. Frequency: 7.00 times per week. Drug: Marijuana (Weed).      Subjective:      Review of Systems   Genitourinary:  Positive for frequency, pelvic pain and urgency.       Objective:   Resp 18   Ht 1.524 m (5')   Wt 93 kg (205 lb)   BMI 40.04 kg/m²     Physical Exam  Constitutional:       General: She is not in acute distress.     Appearance: Normal appearance. She is not ill-appearing, toxic-appearing or diaphoretic.   HENT:      Head: Normocephalic and atraumatic.      Right Ear: External ear normal.      Left Ear: External ear normal.      Nose: Nose normal.      Mouth/Throat:      Mouth: Mucous membranes are moist.   Eyes:      General:         Right eye: No discharge.         Left eye: No discharge.      Conjunctiva/sclera: Conjunctivae normal.   Pulmonary:      Effort: Pulmonary effort is normal. No respiratory distress.   Musculoskeletal:         General: No deformity or signs of injury.      Cervical back: Normal range of motion and neck supple.   Skin:     General: Skin is warm and dry.   Neurological:      General: No focal deficit present.      Mental Status: She is alert.   Psychiatric:         Mood and Affect: Mood normal.         Behavior: Behavior normal.         Thought Content: Thought content normal.         Judgment: Judgment normal.         POC  Results for POC orders placed in visit on 09/05/24   POCT Urinalysis No Micro (Auto)   Result Value Ref Range    Glucose, Ur Negative NEGATIVE mg/dl    Bilirubin, Urine Negative     Ketones, Urine Negative NEGATIVE    Specific Gravity, UA 1.020 1.002 - 1.030    Blood, UA POC Negative NEGATIVE    pH, Urine 6.00 5.0 - 9.0    Protein, Urine Negative NEGATIVE mg/dl    Urobilinogen, Urine 0.20 0.0 - 1.0 eu/dl    Nitrite, Urine Negative NEGATIVE    Leukocyte Clumps, Urine Negative NEGATIVE    Color, UA Yellow YELLOW-STRAW    Character, Urine Clear [id number]         Patients recent PSA values are as follows  No results found for: \"PSA\"

## 2024-09-05 NOTE — PATIENT INSTRUCTIONS
Avoid bladder irritants   Foods and drinks that can be irritating to the bladder and contribute to pain:           Take Ditropan and Pyridium as needed for bladder pain. Pyridium can turn your urine orange.   Follow-up in 12 weeks for a symptom check

## 2024-09-09 ENCOUNTER — HOSPITAL ENCOUNTER (OUTPATIENT)
Dept: PHYSICAL THERAPY | Age: 44
Setting detail: THERAPIES SERIES
Discharge: HOME | End: 2024-09-09
Payer: COMMERCIAL

## 2024-09-09 PROCEDURE — 97112 NEUROMUSCULAR REEDUCATION: CPT

## 2024-09-10 ENCOUNTER — HOSPITAL ENCOUNTER (OUTPATIENT)
Dept: PHYSICAL THERAPY | Age: 44
Setting detail: THERAPIES SERIES
Discharge: HOME | End: 2024-09-10
Payer: COMMERCIAL

## 2024-09-10 PROCEDURE — 97110 THERAPEUTIC EXERCISES: CPT

## 2024-09-11 ENCOUNTER — HOSPITAL ENCOUNTER (OUTPATIENT)
Dept: GENERAL RADIOLOGY | Age: 44
Discharge: HOME | End: 2024-09-11
Payer: COMMERCIAL

## 2024-09-11 ENCOUNTER — HOSPITAL ENCOUNTER (OUTPATIENT)
Age: 44
Discharge: HOME | End: 2024-09-11
Payer: COMMERCIAL

## 2024-09-11 DIAGNOSIS — R06.02 SOB (SHORTNESS OF BREATH): ICD-10-CM

## 2024-09-11 PROCEDURE — 71046 X-RAY EXAM CHEST 2 VIEWS: CPT

## 2024-09-16 ENCOUNTER — APPOINTMENT (OUTPATIENT)
Dept: PHYSICAL THERAPY | Age: 44
End: 2024-09-16
Payer: COMMERCIAL

## 2024-09-17 ENCOUNTER — HOSPITAL ENCOUNTER (OUTPATIENT)
Dept: PHYSICAL THERAPY | Age: 44
Setting detail: THERAPIES SERIES
Discharge: HOME | End: 2024-09-17
Payer: COMMERCIAL

## 2024-09-17 PROCEDURE — 97140 MANUAL THERAPY 1/> REGIONS: CPT

## 2024-09-17 PROCEDURE — 97110 THERAPEUTIC EXERCISES: CPT

## 2024-09-17 PROCEDURE — 97530 THERAPEUTIC ACTIVITIES: CPT

## 2024-09-22 ENCOUNTER — HOSPITAL ENCOUNTER (EMERGENCY)
Age: 44
Discharge: HOME | End: 2024-09-22
Payer: COMMERCIAL

## 2024-09-22 ENCOUNTER — APPOINTMENT (OUTPATIENT)
Dept: CT IMAGING | Age: 44
End: 2024-09-22
Payer: COMMERCIAL

## 2024-09-22 VITALS
HEART RATE: 81 BPM | BODY MASS INDEX: 41.23 KG/M2 | WEIGHT: 210 LBS | TEMPERATURE: 97.6 F | DIASTOLIC BLOOD PRESSURE: 94 MMHG | HEIGHT: 60 IN | OXYGEN SATURATION: 100 % | SYSTOLIC BLOOD PRESSURE: 132 MMHG

## 2024-09-22 DIAGNOSIS — J18.9 PNEUMONIA DUE TO INFECTIOUS ORGANISM, UNSPECIFIED LATERALITY, UNSPECIFIED PART OF LUNG: Primary | ICD-10-CM

## 2024-09-22 LAB
ALBUMIN SERPL BCG-MCNC: 4.2 G/DL (ref 3.5–5.1)
ALBUMIN SERPL BCG-MCNC: 4.2 G/DL (ref 3.5–5.1)
ALP SERPL-CCNC: 75 U/L (ref 38–126)
ALP SERPL-CCNC: 75 U/L (ref 38–126)
ALT SERPL W/O P-5'-P-CCNC: 14 U/L (ref 11–66)
ALT SERPL W/O P-5'-P-CCNC: 15 U/L (ref 11–66)
ANION GAP SERPL CALC-SCNC: 10 MEQ/L (ref 8–16)
AST SERPL-CCNC: 11 U/L (ref 5–40)
AST SERPL-CCNC: 11 U/L (ref 5–40)
BASOPHILS ABSOLUTE: 0 THOU/MM3 (ref 0–0.1)
BASOPHILS NFR BLD AUTO: 0.2 %
BILIRUB CONJ SERPL-MCNC: < 0.1 MG/DL (ref 0.1–13.8)
BILIRUB SERPL-MCNC: 0.2 MG/DL (ref 0.3–1.2)
BILIRUB SERPL-MCNC: 0.2 MG/DL (ref 0.3–1.2)
BILIRUB UR QL STRIP.AUTO: NEGATIVE
BUN SERPL-MCNC: 18 MG/DL (ref 7–22)
CALCIUM SERPL-MCNC: 9.4 MG/DL (ref 8.5–10.5)
CHARACTER UR: CLEAR
CHLORIDE SERPL-SCNC: 104 MEQ/L (ref 98–111)
CO2 SERPL-SCNC: 25 MEQ/L (ref 23–33)
COLOR, UA: YELLOW
CREAT SERPL-MCNC: 0.7 MG/DL (ref 0.4–1.2)
DEPRECATED RDW RBC AUTO: 43.8 FL (ref 35–45)
EOSINOPHIL NFR BLD AUTO: 1 %
EOSINOPHILS ABSOLUTE: 0.1 THOU/MM3 (ref 0–0.4)
ERYTHROCYTE [DISTWIDTH] IN BLOOD BY AUTOMATED COUNT: 13.3 % (ref 11.5–14.5)
GFR SERPL CREATININE-BSD FRML MDRD: > 90 ML/MIN/1.73M2
GLUCOSE SERPL-MCNC: 91 MG/DL (ref 70–108)
GLUCOSE UR QL STRIP.AUTO: NEGATIVE MG/DL
HCT VFR BLD AUTO: 42.5 % (ref 37–47)
HGB BLD-MCNC: 14.2 GM/DL (ref 12–16)
HGB UR QL STRIP.AUTO: NEGATIVE
IMM GRANULOCYTES # BLD AUTO: 0.08 THOU/MM3 (ref 0–0.07)
IMM GRANULOCYTES NFR BLD AUTO: 1 %
KETONES UR QL STRIP.AUTO: NEGATIVE
LIPASE SERPL-CCNC: 38.8 U/L (ref 5.6–51.3)
LYMPHOCYTES ABSOLUTE: 3.1 THOU/MM3 (ref 1–4.8)
LYMPHOCYTES NFR BLD AUTO: 38.1 %
MAGNESIUM SERPL-MCNC: 2 MG/DL (ref 1.6–2.4)
MCH RBC QN AUTO: 30.3 PG (ref 26–33)
MCHC RBC AUTO-ENTMCNC: 33.4 GM/DL (ref 32.2–35.5)
MCV RBC AUTO: 90.6 FL (ref 81–99)
MONOCYTES ABSOLUTE: 0.5 THOU/MM3 (ref 0.4–1.3)
MONOCYTES NFR BLD AUTO: 6.7 %
NEUTROPHILS ABSOLUTE: 4.3 THOU/MM3 (ref 1.8–7.7)
NEUTROPHILS NFR BLD AUTO: 53 %
NITRITE UR QL STRIP: NEGATIVE
NRBC BLD AUTO-RTO: 0 /100 WBC
OSMOLALITY SERPL CALC.SUM OF ELEC: 279 MOSMOL/KG (ref 275–300)
PH UR STRIP.AUTO: 6 [PH] (ref 5–9)
PLATELET # BLD AUTO: 250 THOU/MM3 (ref 130–400)
PMV BLD AUTO: 10.9 FL (ref 9.4–12.4)
POTASSIUM SERPL-SCNC: 4.2 MEQ/L (ref 3.5–5.2)
PROT SERPL-MCNC: 6.8 G/DL (ref 6.1–8)
PROT SERPL-MCNC: 6.9 G/DL (ref 6.1–8)
PROT UR STRIP.AUTO-MCNC: NEGATIVE MG/DL
RBC # BLD AUTO: 4.69 MILL/MM3 (ref 4.2–5.4)
SODIUM SERPL-SCNC: 139 MEQ/L (ref 135–145)
SP GR UR REFRACT.AUTO: 1.01 (ref 1–1.03)
TROPONIN, HIGH SENSITIVITY: 7 NG/L (ref 0–12)
UROBILINOGEN, URINE: 0.2 EU/DL (ref 0–1)
WBC # BLD AUTO: 8.2 THOU/MM3 (ref 4.8–10.8)
WBC #/AREA URNS HPF: NEGATIVE /[HPF]

## 2024-09-22 PROCEDURE — 36415 COLL VENOUS BLD VENIPUNCTURE: CPT

## 2024-09-22 PROCEDURE — 99285 EMERGENCY DEPT VISIT HI MDM: CPT

## 2024-09-22 PROCEDURE — 96374 THER/PROPH/DIAG INJ IV PUSH: CPT

## 2024-09-22 PROCEDURE — 83735 ASSAY OF MAGNESIUM: CPT

## 2024-09-22 PROCEDURE — 6360000004 HC RX CONTRAST MEDICATION: Performed by: PHYSICIAN ASSISTANT

## 2024-09-22 PROCEDURE — 84484 ASSAY OF TROPONIN QUANT: CPT

## 2024-09-22 PROCEDURE — 96375 TX/PRO/DX INJ NEW DRUG ADDON: CPT

## 2024-09-22 PROCEDURE — 80053 COMPREHEN METABOLIC PANEL: CPT

## 2024-09-22 PROCEDURE — 93005 ELECTROCARDIOGRAM TRACING: CPT | Performed by: PHYSICIAN ASSISTANT

## 2024-09-22 PROCEDURE — 6370000000 HC RX 637 (ALT 250 FOR IP): Performed by: PHYSICIAN ASSISTANT

## 2024-09-22 PROCEDURE — 85025 COMPLETE CBC W/AUTO DIFF WBC: CPT

## 2024-09-22 PROCEDURE — 6360000002 HC RX W HCPCS: Performed by: PHYSICIAN ASSISTANT

## 2024-09-22 PROCEDURE — 74177 CT ABD & PELVIS W/CONTRAST: CPT

## 2024-09-22 PROCEDURE — 83690 ASSAY OF LIPASE: CPT

## 2024-09-22 PROCEDURE — 81003 URINALYSIS AUTO W/O SCOPE: CPT

## 2024-09-22 RX ORDER — TRAMADOL HYDROCHLORIDE 50 MG/1
50 TABLET ORAL EVERY 6 HOURS PRN
Qty: 12 TABLET | Refills: 0 | Status: SHIPPED | OUTPATIENT
Start: 2024-09-22 | End: 2024-09-25

## 2024-09-22 RX ORDER — PANTOPRAZOLE SODIUM 40 MG/10ML
40 INJECTION, POWDER, LYOPHILIZED, FOR SOLUTION INTRAVENOUS ONCE
Status: COMPLETED | OUTPATIENT
Start: 2024-09-22 | End: 2024-09-22

## 2024-09-22 RX ORDER — TRAMADOL HYDROCHLORIDE 50 MG/1
50 TABLET ORAL ONCE
Status: COMPLETED | OUTPATIENT
Start: 2024-09-22 | End: 2024-09-22

## 2024-09-22 RX ORDER — MORPHINE SULFATE 4 MG/ML
4 INJECTION, SOLUTION INTRAMUSCULAR; INTRAVENOUS ONCE
Status: COMPLETED | OUTPATIENT
Start: 2024-09-22 | End: 2024-09-22

## 2024-09-22 RX ORDER — ONDANSETRON 2 MG/ML
4 INJECTION INTRAMUSCULAR; INTRAVENOUS ONCE
Status: COMPLETED | OUTPATIENT
Start: 2024-09-22 | End: 2024-09-22

## 2024-09-22 RX ORDER — LEVOFLOXACIN 750 MG/1
750 TABLET, FILM COATED ORAL DAILY
Qty: 10 TABLET | Refills: 0 | Status: SHIPPED | OUTPATIENT
Start: 2024-09-22 | End: 2024-10-02

## 2024-09-22 RX ORDER — LEVOFLOXACIN 500 MG/1
750 TABLET, FILM COATED ORAL ONCE
Status: COMPLETED | OUTPATIENT
Start: 2024-09-22 | End: 2024-09-22

## 2024-09-22 RX ORDER — IOPAMIDOL 755 MG/ML
80 INJECTION, SOLUTION INTRAVASCULAR
Status: COMPLETED | OUTPATIENT
Start: 2024-09-22 | End: 2024-09-22

## 2024-09-22 RX ADMIN — PANTOPRAZOLE SODIUM 40 MG: 40 INJECTION, POWDER, FOR SOLUTION INTRAVENOUS at 10:09

## 2024-09-22 RX ADMIN — TRAMADOL HYDROCHLORIDE 50 MG: 50 TABLET ORAL at 12:00

## 2024-09-22 RX ADMIN — IOPAMIDOL 80 ML: 755 INJECTION, SOLUTION INTRAVENOUS at 10:31

## 2024-09-22 RX ADMIN — MORPHINE SULFATE 4 MG: 4 INJECTION, SOLUTION INTRAMUSCULAR; INTRAVENOUS at 10:09

## 2024-09-22 RX ADMIN — ONDANSETRON 4 MG: 2 INJECTION INTRAMUSCULAR; INTRAVENOUS at 10:09

## 2024-09-22 RX ADMIN — LEVOFLOXACIN 750 MG: 500 TABLET, FILM COATED ORAL at 12:00

## 2024-09-22 ASSESSMENT — PAIN DESCRIPTION - DESCRIPTORS: DESCRIPTORS: SHARP;STABBING

## 2024-09-22 ASSESSMENT — PAIN DESCRIPTION - LOCATION: LOCATION: ABDOMEN

## 2024-09-22 ASSESSMENT — PAIN DESCRIPTION - PAIN TYPE: TYPE: ACUTE PAIN

## 2024-09-22 ASSESSMENT — PAIN DESCRIPTION - ORIENTATION: ORIENTATION: RIGHT;MID

## 2024-09-22 ASSESSMENT — PAIN - FUNCTIONAL ASSESSMENT: PAIN_FUNCTIONAL_ASSESSMENT: 0-10

## 2024-09-22 ASSESSMENT — PAIN SCALES - GENERAL: PAINLEVEL_OUTOF10: 9

## 2024-09-23 ENCOUNTER — OFFICE VISIT (OUTPATIENT)
Dept: PULMONOLOGY | Age: 44
End: 2024-09-23
Payer: COMMERCIAL

## 2024-09-23 VITALS
WEIGHT: 208.2 LBS | DIASTOLIC BLOOD PRESSURE: 80 MMHG | OXYGEN SATURATION: 96 % | TEMPERATURE: 98.6 F | BODY MASS INDEX: 39.31 KG/M2 | HEIGHT: 61 IN | HEART RATE: 88 BPM | SYSTOLIC BLOOD PRESSURE: 138 MMHG

## 2024-09-23 DIAGNOSIS — Z91.199 POOR COMPLIANCE WITH CONTINUOUS POSITIVE AIRWAY PRESSURE TREATMENT: ICD-10-CM

## 2024-09-23 DIAGNOSIS — G47.33 OSA ON CPAP: Primary | ICD-10-CM

## 2024-09-23 DIAGNOSIS — R94.31 ABNORMAL EKG: ICD-10-CM

## 2024-09-23 DIAGNOSIS — E66.9 OBESITY (BMI 30-39.9): ICD-10-CM

## 2024-09-23 LAB
EKG ATRIAL RATE: 70 BPM
EKG P AXIS: 27 DEGREES
EKG P-R INTERVAL: 130 MS
EKG Q-T INTERVAL: 412 MS
EKG QRS DURATION: 86 MS
EKG QTC CALCULATION (BAZETT): 444 MS
EKG R AXIS: 78 DEGREES
EKG T AXIS: 44 DEGREES
EKG VENTRICULAR RATE: 70 BPM

## 2024-09-23 PROCEDURE — 99213 OFFICE O/P EST LOW 20 MIN: CPT | Performed by: NURSE PRACTITIONER

## 2024-09-23 PROCEDURE — 93010 ELECTROCARDIOGRAM REPORT: CPT | Performed by: INTERNAL MEDICINE

## 2024-09-23 PROCEDURE — G8417 CALC BMI ABV UP PARAM F/U: HCPCS | Performed by: NURSE PRACTITIONER

## 2024-09-23 PROCEDURE — 4004F PT TOBACCO SCREEN RCVD TLK: CPT | Performed by: NURSE PRACTITIONER

## 2024-09-23 PROCEDURE — G8427 DOCREV CUR MEDS BY ELIG CLIN: HCPCS | Performed by: NURSE PRACTITIONER

## 2024-09-23 ASSESSMENT — ENCOUNTER SYMPTOMS
SHORTNESS OF BREATH: 0
RESPIRATORY NEGATIVE: 1
ALLERGIC/IMMUNOLOGIC NEGATIVE: 1
EYES NEGATIVE: 1
GASTROINTESTINAL NEGATIVE: 1
COUGH: 0
WHEEZING: 0

## 2024-09-24 ENCOUNTER — APPOINTMENT (OUTPATIENT)
Dept: PHYSICAL THERAPY | Age: 44
End: 2024-09-24
Payer: COMMERCIAL

## 2024-09-24 ENCOUNTER — OFFICE VISIT (OUTPATIENT)
Dept: PULMONOLOGY | Age: 44
End: 2024-09-24
Payer: COMMERCIAL

## 2024-09-24 VITALS
HEART RATE: 95 BPM | OXYGEN SATURATION: 97 % | SYSTOLIC BLOOD PRESSURE: 126 MMHG | DIASTOLIC BLOOD PRESSURE: 80 MMHG | TEMPERATURE: 98.2 F | WEIGHT: 210.6 LBS | HEIGHT: 61 IN | BODY MASS INDEX: 39.76 KG/M2

## 2024-09-24 DIAGNOSIS — R06.02 SOB (SHORTNESS OF BREATH): ICD-10-CM

## 2024-09-24 DIAGNOSIS — R93.89 ABNORMAL CT OF THE CHEST: Primary | ICD-10-CM

## 2024-09-24 DIAGNOSIS — J45.40 MODERATE PERSISTENT ASTHMA WITHOUT COMPLICATION: ICD-10-CM

## 2024-09-24 PROCEDURE — G8417 CALC BMI ABV UP PARAM F/U: HCPCS | Performed by: NURSE PRACTITIONER

## 2024-09-24 PROCEDURE — 99213 OFFICE O/P EST LOW 20 MIN: CPT | Performed by: NURSE PRACTITIONER

## 2024-09-24 PROCEDURE — 4004F PT TOBACCO SCREEN RCVD TLK: CPT | Performed by: NURSE PRACTITIONER

## 2024-09-24 PROCEDURE — G8427 DOCREV CUR MEDS BY ELIG CLIN: HCPCS | Performed by: NURSE PRACTITIONER

## 2024-09-24 ASSESSMENT — ENCOUNTER SYMPTOMS
EYES NEGATIVE: 1
COUGH: 0
GASTROINTESTINAL NEGATIVE: 1
BACK PAIN: 1
SHORTNESS OF BREATH: 1
ALLERGIC/IMMUNOLOGIC NEGATIVE: 1
WHEEZING: 0

## 2024-09-30 ENCOUNTER — HOSPITAL ENCOUNTER (OUTPATIENT)
Dept: PHYSICAL THERAPY | Age: 44
Setting detail: THERAPIES SERIES
Discharge: HOME | End: 2024-09-30
Payer: COMMERCIAL

## 2024-09-30 PROCEDURE — 97110 THERAPEUTIC EXERCISES: CPT

## 2024-09-30 NOTE — PROGRESS NOTES
completing household tasks. GOAL NOT MET:  .  Continue Goal  2. Patient will improve cervical extension AROM to 50 degrees to allow decreased pain with head movements. GOAL MET:   .  Discontinue Goal  3. Patient will improve L LE hip flexion and ankle DF/PF strength to 4+/5 to improve ability to ambulate with a normalized gait pattern. GOAL NOT MET: Hip Flexion not met.  Continue Goal  4. Patient will be indep with HEP in order to meet long term goals. GOAL NOT MET:  .  Continue Goal  5. Pt will have good awareness of correct posture and exercises to help maintain overall good UE and cervical posture. GOAL NOT MET:  .  Continue Goal    Long Term Goals:  Time Frame: 5 weeks     1. Patient will improve Modified Oswestry score from 35/50 to 22/50 to allow decrease in disability and improved functional mobility for improved overall QOL. GOAL NOT MET: 34/50.  Continue Goal  2. Patient will be indep with HEP in order to prevent re-injury and improve ability to perform functional mobility tasks. GOAL NOT MET:  .  Continue Goal    Patient Education:   [x]  HEP/Education Completed: body mechanics and POC  [employer] Access Code: [email or web address]   []  No new Education completed  []  Reviewed Prior HEP      [x]  Patient verbalized and/or demonstrated understanding of education provided.  []  Patient unable to verbalize and/or demonstrate understanding of education provided.  Will continue education.  []  Barriers to learning:     PLAN:  Treatment Recommendations: Strengthening, Range of Motion, Balance Training, Neuromuscular Re-education, Manual Therapy - Soft Tissue Mobilization, Manual Therapy - Joint Manipulation, Pain Management, Home Exercise Program, Patient Education, Integrative Dry Needling, Aquatics, and Modalities    []  Plan of care initiated.  Plan to see patient 2 times per week for 8 weeks to address the treatment planned outlined above.  []  Continue with current plan of care  []  Modify plan of care as follows:

## 2024-10-09 ENCOUNTER — HOSPITAL ENCOUNTER (OUTPATIENT)
Dept: CT IMAGING | Age: 44
Discharge: HOME | End: 2024-10-09
Payer: COMMERCIAL

## 2024-10-09 DIAGNOSIS — R93.89 ABNORMAL CT OF THE CHEST: ICD-10-CM

## 2024-10-09 DIAGNOSIS — R06.02 SOB (SHORTNESS OF BREATH): ICD-10-CM

## 2024-10-09 PROCEDURE — 71250 CT THORAX DX C-: CPT

## 2024-10-15 ENCOUNTER — OFFICE VISIT (OUTPATIENT)
Dept: CARDIOLOGY CLINIC | Age: 44
End: 2024-10-15
Payer: COMMERCIAL

## 2024-10-15 VITALS
WEIGHT: 211.2 LBS | HEIGHT: 61 IN | BODY MASS INDEX: 39.88 KG/M2 | SYSTOLIC BLOOD PRESSURE: 123 MMHG | HEART RATE: 77 BPM | DIASTOLIC BLOOD PRESSURE: 73 MMHG

## 2024-10-15 DIAGNOSIS — R06.02 SHORTNESS OF BREATH: ICD-10-CM

## 2024-10-15 DIAGNOSIS — I20.89 ANGINAL EQUIVALENT (HCC): Primary | ICD-10-CM

## 2024-10-15 DIAGNOSIS — I20.9 ANGINA PECTORIS (HCC): ICD-10-CM

## 2024-10-15 PROCEDURE — G8417 CALC BMI ABV UP PARAM F/U: HCPCS | Performed by: INTERNAL MEDICINE

## 2024-10-15 PROCEDURE — 4004F PT TOBACCO SCREEN RCVD TLK: CPT | Performed by: INTERNAL MEDICINE

## 2024-10-15 PROCEDURE — G8484 FLU IMMUNIZE NO ADMIN: HCPCS | Performed by: INTERNAL MEDICINE

## 2024-10-15 PROCEDURE — G8427 DOCREV CUR MEDS BY ELIG CLIN: HCPCS | Performed by: INTERNAL MEDICINE

## 2024-10-15 PROCEDURE — 99204 OFFICE O/P NEW MOD 45 MIN: CPT | Performed by: INTERNAL MEDICINE

## 2024-10-15 NOTE — PATIENT INSTRUCTIONS
You may receive a survey regarding the care you received during your visit. We encourage you to complete and return your survey, as your input is valuable to us. We hope you will choose us in the future for your healthcare needs. Thank you!    Your Medical Assistant today: Irlanda  Your Nurse today: Dilma  Thank you for coming to our office! It was a pleasure to serve you.
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
## 2024-10-18 ENCOUNTER — TELEPHONE (OUTPATIENT)
Dept: CARDIOLOGY CLINIC | Age: 44
End: 2024-10-18

## 2024-10-18 ENCOUNTER — OFFICE VISIT (OUTPATIENT)
Dept: PULMONOLOGY | Age: 44
End: 2024-10-18

## 2024-10-18 VITALS
OXYGEN SATURATION: 100 % | WEIGHT: 212.6 LBS | HEART RATE: 79 BPM | DIASTOLIC BLOOD PRESSURE: 72 MMHG | TEMPERATURE: 97.8 F | SYSTOLIC BLOOD PRESSURE: 128 MMHG | BODY MASS INDEX: 40.14 KG/M2 | HEIGHT: 61 IN

## 2024-10-18 DIAGNOSIS — R06.02 SOB (SHORTNESS OF BREATH): ICD-10-CM

## 2024-10-18 DIAGNOSIS — J45.40 MODERATE PERSISTENT ASTHMA WITHOUT COMPLICATION: Primary | ICD-10-CM

## 2024-10-18 DIAGNOSIS — E66.01 MORBID OBESITY WITH BMI OF 40.0-44.9, ADULT: ICD-10-CM

## 2024-10-18 DIAGNOSIS — Z72.0 TOBACCO ABUSE: ICD-10-CM

## 2024-10-18 ASSESSMENT — ENCOUNTER SYMPTOMS
SHORTNESS OF BREATH: 1
ALLERGIC/IMMUNOLOGIC NEGATIVE: 1
WHEEZING: 0
BACK PAIN: 1
COUGH: 0
EYES NEGATIVE: 1
GASTROINTESTINAL NEGATIVE: 1
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
## 2024-10-18 NOTE — TELEPHONE ENCOUNTER
The ECHO and myocardial perfusion has been denied by the insurance and the reasons are as follows:        They myocardial perfusion denial reasons:        Peer to peer can be performed at [phone] tracking# [id number]. The denial letter has also been scanned into the pt's chart. Pt currently on the schedule for these services 10/23

## 2024-10-19 ENCOUNTER — HOSPITAL ENCOUNTER (EMERGENCY)
Age: 44
Discharge: HOME | End: 2024-10-19
Payer: COMMERCIAL

## 2024-10-19 VITALS
HEART RATE: 76 BPM | WEIGHT: 212 LBS | RESPIRATION RATE: 20 BRPM | HEIGHT: 61 IN | TEMPERATURE: 98.6 F | BODY MASS INDEX: 40.02 KG/M2 | SYSTOLIC BLOOD PRESSURE: 122 MMHG | OXYGEN SATURATION: 98 % | DIASTOLIC BLOOD PRESSURE: 79 MMHG

## 2024-10-19 DIAGNOSIS — Z20.2 EXPOSURE TO STD: Primary | ICD-10-CM

## 2024-10-19 DIAGNOSIS — A59.9 TRICHOMONIASIS: ICD-10-CM

## 2024-10-19 LAB
CHLAMYDIA, GC DNA AMP, URINE: NORMAL
T VAGINALIS AG GENITAL QL IA: POSITIVE

## 2024-10-19 PROCEDURE — 87491 CHLMYD TRACH DNA AMP PROBE: CPT

## 2024-10-19 PROCEDURE — 87205 SMEAR GRAM STAIN: CPT

## 2024-10-19 PROCEDURE — 99214 OFFICE O/P EST MOD 30 MIN: CPT

## 2024-10-19 PROCEDURE — 87591 N.GONORRHOEAE DNA AMP PROB: CPT

## 2024-10-19 PROCEDURE — 2500000003 HC RX 250 WO HCPCS

## 2024-10-19 PROCEDURE — 99213 OFFICE O/P EST LOW 20 MIN: CPT

## 2024-10-19 PROCEDURE — 87070 CULTURE OTHR SPECIMN AEROBIC: CPT

## 2024-10-19 PROCEDURE — 6360000002 HC RX W HCPCS

## 2024-10-19 PROCEDURE — 96372 THER/PROPH/DIAG INJ SC/IM: CPT

## 2024-10-19 PROCEDURE — 87808 TRICHOMONAS ASSAY W/OPTIC: CPT

## 2024-10-19 RX ORDER — METRONIDAZOLE 500 MG/1
500 TABLET ORAL 2 TIMES DAILY
Qty: 14 TABLET | Refills: 0 | Status: SHIPPED | OUTPATIENT
Start: 2024-10-19 | End: 2024-10-26

## 2024-10-19 RX ORDER — DOXYCYCLINE HYCLATE 100 MG
100 TABLET ORAL 2 TIMES DAILY
Qty: 14 TABLET | Refills: 0 | Status: SHIPPED | OUTPATIENT
Start: 2024-10-19 | End: 2024-10-26

## 2024-10-19 RX ADMIN — LIDOCAINE HYDROCHLORIDE 500 MG: 10 INJECTION, SOLUTION EPIDURAL; INFILTRATION; INTRACAUDAL; PERINEURAL at 09:07

## 2024-10-19 ASSESSMENT — PAIN - FUNCTIONAL ASSESSMENT: PAIN_FUNCTIONAL_ASSESSMENT: NONE - DENIES PAIN

## 2024-10-19 NOTE — DISCHARGE INSTRUCTIONS
Medication as prescribed, complete all. Avoid alcohol with Flagyl as will make ill.   Avoid all sexual intercourse for 10 days, notify partner of positive results.   Results in 1-5 days.  Tylenol / Ibuprofen as needed for fever and or pain.  Follow up with PCP or GYN in 3-5 days if no improvement or sooner with worsening symptoms.

## 2024-10-19 NOTE — ED PROVIDER NOTES
[facility] URGENT CARE  Urgent Care Encounter       CHIEF COMPLAINT       Chief Complaint   Patient presents with    Vaginal Discharge       Nurses Notes reviewed and I agree except as noted in the HPI.  HISTORY OF PRESENT ILLNESS   Chelsey Juarez is a 44 y.o. female who presents with concerns of abnormal \"thin\" vaginal discharge for the past two days. Patient reports occasional pelvic pain as well. Reports no partner and desires STI testing and treatment.     HPI    REVIEW OF SYSTEMS     Review of Systems   Constitutional:  Negative for fever.   Genitourinary:  Positive for pelvic pain and vaginal discharge (\"thin\" \"no odor\"). Negative for vaginal bleeding and vaginal pain.   All other systems reviewed and are negative.      PAST MEDICAL HISTORY         Diagnosis Date    Airway obstruction     Back pain     chronic    Cystitis, interstitial     Esophageal ulcer     GERD (gastroesophageal reflux disease)     Headache(784.0)     Hyperlipidemia     Insulin resistance     Irritable bowel syndrome     Spondylolisthesis     Spondylolysis        SURGICALHISTORY     Patient  has a past surgical history that includes cyst removal (01/01/2011); Hysterectomy (2010); Abdominal adhesion surgery (01/01/2012); Colonoscopy (2005, 2011); Tonsillectomy and adenoidectomy (01/01/1987); [place] tooth extraction (01/01/1996); Ovary removal (2010); Shoulder arthroscopy (Left, 04/17/2023); Ankle surgery (Right, 2011); and Upper gastrointestinal endoscopy (06/2024).    CURRENT MEDICATIONS       Previous Medications    ALBUTEROL (PROVENTIL) (2.5 MG/3ML) 0.083% NEBULIZER SOLUTION    Take 3 mLs by nebulization every 6 hours as needed for Wheezing or Shortness of Breath    ALBUTEROL SULFATE HFA (VENTOLIN HFA) 108 (90 BASE) MCG/ACT INHALER    Inhale 2 puffs into the lungs every 6 hours as needed for Wheezing    ALPRAZOLAM (XANAX) 0.25 MG TABLET    Take 1 tablet by mouth every 6 hours as needed for Anxiety.    AMITRIPTYLINE (ELAVIL)

## 2024-10-20 LAB
BACTERIA GENITAL AEROBE CULT: NORMAL
GRAM STN GENITAL: NORMAL

## 2024-10-21 ENCOUNTER — TELEPHONE (OUTPATIENT)
Dept: CARDIOLOGY CLINIC | Age: 44
End: 2024-10-21

## 2024-10-21 LAB
REASON FOR REJECTION: NORMAL
REJECTED TEST: NORMAL

## 2024-10-21 NOTE — TELEPHONE ENCOUNTER
Trying to get stress test approved. Need reason patient can not complete exercise stress,  Called patient- unable to tolerate walking long distance d/t spinal abnormalities- can you addend note please?    ALSO WE NEED TO UPLOAD EKG TO PA

## 2024-10-21 NOTE — TELEPHONE ENCOUNTER
GOMEZ  Called back and spoke to physician reviewer, Dr Zavala.  He reviewed the newly uploaded documents. He can see the EKG report, but not the tracing.   Based on what he has to review, he does not place patient at high pre-test probability.  He is able to approve echo, not the stress at this time which is the complete opposite of the what the other physician reviewer explained to Timothy previously.  Only one test can be approved at a time due to pts insurance plan.  We can try again for stress authorization after echo is completed.     Echo Auth: [id number]  Valid 10/15-11/14    Scheduling notified. Stress to be cancelled.   Echo to be done 10/23 follow for results.  Karishma uploaded EKG tracing into the [facility] portal so that it is there when we resubmit the stress test after the echo is completed.

## 2024-10-21 NOTE — TELEPHONE ENCOUNTER
Spoke to pt, stress test cancelled, echo scheduled 10-23-24, arrive 3:00pm  Pt informed  Onelia, front staff will notify [facility] for time change

## 2024-10-21 NOTE — TELEPHONE ENCOUNTER
The myocardial perfusion and ECHO has been denied by the insurance and the reasons are as follows:        The myocardial perfusion denial reason:        Peer to peer can be performed at [phone] tracking# [id number]. Denial letters has also been scanned into the pt's chart. Pt currently on the schedule for these services 10/23

## 2024-10-21 NOTE — TELEPHONE ENCOUNTER
The EKG were uploaded when the case was initiated. Addended OV dated 10/15 and EKG uploaded to the web portal

## 2024-10-22 LAB
BACTERIA GENITAL AEROBE CULT: NORMAL
GRAM STN GENITAL: NORMAL

## 2024-10-23 ENCOUNTER — HOSPITAL ENCOUNTER (OUTPATIENT)
Age: 44
Discharge: HOME | End: 2024-10-25
Attending: INTERNAL MEDICINE
Payer: COMMERCIAL

## 2024-10-23 ENCOUNTER — APPOINTMENT (OUTPATIENT)
Age: 44
End: 2024-10-23
Attending: INTERNAL MEDICINE
Payer: COMMERCIAL

## 2024-10-23 ENCOUNTER — APPOINTMENT (OUTPATIENT)
Dept: NUCLEAR MEDICINE | Age: 44
End: 2024-10-23
Attending: INTERNAL MEDICINE
Payer: COMMERCIAL

## 2024-10-23 VITALS
HEIGHT: 61 IN | DIASTOLIC BLOOD PRESSURE: 79 MMHG | BODY MASS INDEX: 40.02 KG/M2 | WEIGHT: 212 LBS | SYSTOLIC BLOOD PRESSURE: 122 MMHG

## 2024-10-23 DIAGNOSIS — R06.02 SHORTNESS OF BREATH: ICD-10-CM

## 2024-10-23 PROCEDURE — 93306 TTE W/DOPPLER COMPLETE: CPT

## 2024-10-24 LAB
ECHO AO ASC DIAM: 3 CM
ECHO AO ASCENDING AORTA INDEX: 1.55 CM/M2
ECHO AV CUSP MM: 2 CM
ECHO AV PEAK GRADIENT: 8 MMHG
ECHO AV PEAK VELOCITY: 1.4 M/S
ECHO AV VELOCITY RATIO: 0.43
ECHO BSA: 2.03 M2
ECHO EST RA PRESSURE: 3 MMHG
ECHO LA AREA 2C: 14.9 CM2
ECHO LA AREA 4C: 13.3 CM2
ECHO LA DIAMETER INDEX: 1.8 CM/M2
ECHO LA DIAMETER: 3.5 CM
ECHO LA MAJOR AXIS: 4.9 CM
ECHO LA MINOR AXIS: 5 CM
ECHO LA VOL BP: 33 ML (ref 22–52)
ECHO LA VOL MOD A2C: 37 ML (ref 22–52)
ECHO LA VOL MOD A4C: 28 ML (ref 22–52)
ECHO LA VOL/BSA BIPLANE: 17 ML/M2 (ref 16–34)
ECHO LA VOLUME INDEX MOD A2C: 19 ML/M2 (ref 16–34)
ECHO LA VOLUME INDEX MOD A4C: 14 ML/M2 (ref 16–34)
ECHO LV E' LATERAL VELOCITY: 10.7 CM/S
ECHO LV E' SEPTAL VELOCITY: 8.1 CM/S
ECHO LV EJECTION FRACTION BIPLANE: 62 % (ref 55–100)
ECHO LV FRACTIONAL SHORTENING: 34 % (ref 28–44)
ECHO LV INTERNAL DIMENSION DIASTOLE INDEX: 2.42 CM/M2
ECHO LV INTERNAL DIMENSION DIASTOLIC: 4.7 CM (ref 3.9–5.3)
ECHO LV INTERNAL DIMENSION SYSTOLIC INDEX: 1.6 CM/M2
ECHO LV INTERNAL DIMENSION SYSTOLIC: 3.1 CM
ECHO LV ISOVOLUMETRIC RELAXATION TIME (IVRT): 70 MS
ECHO LV IVSD: 0.8 CM (ref 0.6–0.9)
ECHO LV MASS 2D: 132.3 G (ref 67–162)
ECHO LV MASS INDEX 2D: 68.2 G/M2 (ref 43–95)
ECHO LV POSTERIOR WALL DIASTOLIC: 0.9 CM (ref 0.6–0.9)
ECHO LV RELATIVE WALL THICKNESS RATIO: 0.38
ECHO LVOT PEAK GRADIENT: 2 MMHG
ECHO LVOT PEAK VELOCITY: 0.6 M/S
ECHO MV A VELOCITY: 0.7 M/S
ECHO MV E DECELERATION TIME (DT): 219 MS
ECHO MV E VELOCITY: 0.86 M/S
ECHO MV E/A RATIO: 1.23
ECHO MV E/E' LATERAL: 8.04
ECHO MV E/E' RATIO (AVERAGED): 9.33
ECHO MV E/E' SEPTAL: 10.62
ECHO PV MAX VELOCITY: 1 M/S
ECHO PV PEAK GRADIENT: 4 MMHG
ECHO RV INTERNAL DIMENSION: 2.2 CM
ECHO RV TAPSE: 1.7 CM (ref 1.7–?)
ECHO TV E WAVE: 0.6 M/S

## 2024-10-25 ENCOUNTER — TELEPHONE (OUTPATIENT)
Dept: CARDIOLOGY CLINIC | Age: 44
End: 2024-10-25

## 2024-11-06 NOTE — TELEPHONE ENCOUNTER
Called to do an appeal as I spoke to patient to see if she could walk on the treadmill.      Pt states she had 4 injections in her ankle under floro TODAY.   3 weeks ago she had lumbar injections  3 weeks before that she had neck injections and 3 weeks prior to that she had thoracic RFA    She states she is not even aloud to use a \"green resistance band\" in physical therapy because it is equal to 5 pounds and she is not aloud to life 5 pounds.      She states this all started because the issues with her back caused drop foot which made her fall and brake her shoulder and now.  She is also waiting on n MRI of the brain d/t this fall.     Fatou, I called to do an appeal as it said the re-reviw was denied.  They said it would need uploaded for case # [id number]  Thank you   
Can we attempt to get stress test approved?  
Central scheduling called, stress test scheduled 11-12-24  Order printed and filed  
Fatou, is this being worked on?  
Fatou, they gave a different tracking number.  They said they started a new case so the rest of the information can be uploaded d/t injections in her foot/ankle, back, neck and thoracic, therefore, she can not walk on the treadmill.    They gave me  # [id number]                             
Follow up mid level in clinic  
Peer to Peer done with Dr Toro-Stress test approved [id number] good until 11/28/24.     Pt aware  
Re-review was denied.     Please advise  
Stress test denied until ECHO done.  Please review ECHO.     Interpretation Summary  Show Result Comparison     Left Ventricle: Normal left ventricular systolic function with a visually estimated EF of 55 - 60%. Left ventricle size is normal. Normal wall thickness.    Right Ventricle: Not well visualized. Right ventricle size is normal.    Aortic Valve: Not well visualized. Trileaflet valve.    Image quality is adequate.    Do you want to retry to get the stress test ?  
The denial withheld. See below:        I will initiate a new case  
The myocardial perfusion stress test has been denied by the insurance and the reasons are as follows:        Peer to peer can be performed at [phone] tracking# [id number]. The denial letter has also been scanned into the pt's chart  
[place], that tracking# you're noting is not valid. Please see the message below:        This is the correct tracking# [id number]     Your last digit is off. These are the only 2 tracking numbers for this specific patient and both myocardial stress test request has been denied by the insurance      And the TE dated 10/25 that were noted in regards to the injections were uploaded to the case       
Yes, please retry  
5

## 2024-11-11 ENCOUNTER — APPOINTMENT (OUTPATIENT)
Dept: GENERAL RADIOLOGY | Age: 44
End: 2024-11-11
Payer: COMMERCIAL

## 2024-11-11 ENCOUNTER — HOSPITAL ENCOUNTER (EMERGENCY)
Age: 44
Discharge: HOME | End: 2024-11-11
Attending: EMERGENCY MEDICINE
Payer: COMMERCIAL

## 2024-11-11 VITALS
HEART RATE: 57 BPM | BODY MASS INDEX: 40.06 KG/M2 | SYSTOLIC BLOOD PRESSURE: 139 MMHG | RESPIRATION RATE: 13 BRPM | DIASTOLIC BLOOD PRESSURE: 83 MMHG | TEMPERATURE: 97.5 F | WEIGHT: 212 LBS | OXYGEN SATURATION: 100 %

## 2024-11-11 DIAGNOSIS — R07.89 CHEST WALL PAIN: Primary | ICD-10-CM

## 2024-11-11 LAB
ALBUMIN SERPL BCG-MCNC: 4.1 G/DL (ref 3.5–5.1)
ALP SERPL-CCNC: 89 U/L (ref 38–126)
ALT SERPL W/O P-5'-P-CCNC: 15 U/L (ref 11–66)
ANION GAP SERPL CALC-SCNC: 11 MEQ/L (ref 8–16)
AST SERPL-CCNC: 12 U/L (ref 5–40)
BASOPHILS ABSOLUTE: 0 THOU/MM3 (ref 0–0.1)
BASOPHILS NFR BLD AUTO: 0.2 %
BILIRUB CONJ SERPL-MCNC: < 0.1 MG/DL (ref 0.1–13.8)
BILIRUB SERPL-MCNC: < 0.2 MG/DL (ref 0.3–1.2)
BUN SERPL-MCNC: 17 MG/DL (ref 7–22)
CALCIUM SERPL-MCNC: 9.4 MG/DL (ref 8.5–10.5)
CHLORIDE SERPL-SCNC: 106 MEQ/L (ref 98–111)
CO2 SERPL-SCNC: 25 MEQ/L (ref 23–33)
CREAT SERPL-MCNC: 0.6 MG/DL (ref 0.4–1.2)
D DIMER PPP IA.FEU-MCNC: < 215 NG/ML FEU (ref 0–500)
DEPRECATED RDW RBC AUTO: 46 FL (ref 35–45)
EKG ATRIAL RATE: 72 BPM
EKG P AXIS: 45 DEGREES
EKG P-R INTERVAL: 134 MS
EKG Q-T INTERVAL: 408 MS
EKG QRS DURATION: 84 MS
EKG QTC CALCULATION (BAZETT): 446 MS
EKG R AXIS: 71 DEGREES
EKG T AXIS: 68 DEGREES
EKG VENTRICULAR RATE: 72 BPM
EOSINOPHIL NFR BLD AUTO: 1.1 %
EOSINOPHILS ABSOLUTE: 0.1 THOU/MM3 (ref 0–0.4)
ERYTHROCYTE [DISTWIDTH] IN BLOOD BY AUTOMATED COUNT: 13.6 % (ref 11.5–14.5)
GFR SERPL CREATININE-BSD FRML MDRD: > 90 ML/MIN/1.73M2
GLUCOSE SERPL-MCNC: 97 MG/DL (ref 70–108)
HCT VFR BLD AUTO: 41.9 % (ref 37–47)
HGB BLD-MCNC: 13.8 GM/DL (ref 12–16)
IMM GRANULOCYTES # BLD AUTO: 0.07 THOU/MM3 (ref 0–0.07)
IMM GRANULOCYTES NFR BLD AUTO: 0.9 %
LIPASE SERPL-CCNC: 40.7 U/L (ref 5.6–51.3)
LYMPHOCYTES ABSOLUTE: 3.1 THOU/MM3 (ref 1–4.8)
LYMPHOCYTES NFR BLD AUTO: 38.8 %
MCH RBC QN AUTO: 30.4 PG (ref 26–33)
MCHC RBC AUTO-ENTMCNC: 32.9 GM/DL (ref 32.2–35.5)
MCV RBC AUTO: 92.3 FL (ref 81–99)
MONOCYTES ABSOLUTE: 0.5 THOU/MM3 (ref 0.4–1.3)
MONOCYTES NFR BLD AUTO: 6.4 %
NEUTROPHILS ABSOLUTE: 4.3 THOU/MM3 (ref 1.8–7.7)
NEUTROPHILS NFR BLD AUTO: 52.6 %
NRBC BLD AUTO-RTO: 0 /100 WBC
OSMOLALITY SERPL CALC.SUM OF ELEC: 284.6 MOSMOL/KG (ref 275–300)
PLATELET # BLD AUTO: 234 THOU/MM3 (ref 130–400)
PMV BLD AUTO: 10.3 FL (ref 9.4–12.4)
POTASSIUM SERPL-SCNC: 4 MEQ/L (ref 3.5–5.2)
PROT SERPL-MCNC: 7 G/DL (ref 6.1–8)
RBC # BLD AUTO: 4.54 MILL/MM3 (ref 4.2–5.4)
SODIUM SERPL-SCNC: 142 MEQ/L (ref 135–145)
TROPONIN, HIGH SENSITIVITY: < 6 NG/L (ref 0–12)
WBC # BLD AUTO: 8.1 THOU/MM3 (ref 4.8–10.8)

## 2024-11-11 PROCEDURE — 85025 COMPLETE CBC W/AUTO DIFF WBC: CPT

## 2024-11-11 PROCEDURE — 6370000000 HC RX 637 (ALT 250 FOR IP): Performed by: EMERGENCY MEDICINE

## 2024-11-11 PROCEDURE — 82248 BILIRUBIN DIRECT: CPT

## 2024-11-11 PROCEDURE — 36415 COLL VENOUS BLD VENIPUNCTURE: CPT

## 2024-11-11 PROCEDURE — 93010 ELECTROCARDIOGRAM REPORT: CPT | Performed by: INTERNAL MEDICINE

## 2024-11-11 PROCEDURE — 85379 FIBRIN DEGRADATION QUANT: CPT

## 2024-11-11 PROCEDURE — 96374 THER/PROPH/DIAG INJ IV PUSH: CPT

## 2024-11-11 PROCEDURE — 93005 ELECTROCARDIOGRAM TRACING: CPT | Performed by: EMERGENCY MEDICINE

## 2024-11-11 PROCEDURE — 99285 EMERGENCY DEPT VISIT HI MDM: CPT

## 2024-11-11 PROCEDURE — 80053 COMPREHEN METABOLIC PANEL: CPT

## 2024-11-11 PROCEDURE — 83690 ASSAY OF LIPASE: CPT

## 2024-11-11 PROCEDURE — 71046 X-RAY EXAM CHEST 2 VIEWS: CPT

## 2024-11-11 PROCEDURE — 84484 ASSAY OF TROPONIN QUANT: CPT

## 2024-11-11 PROCEDURE — 6360000002 HC RX W HCPCS: Performed by: EMERGENCY MEDICINE

## 2024-11-11 RX ORDER — HYDROCODONE BITARTRATE AND ACETAMINOPHEN 5; 325 MG/1; MG/1
1 TABLET ORAL ONCE
Status: COMPLETED | OUTPATIENT
Start: 2024-11-11 | End: 2024-11-11

## 2024-11-11 RX ORDER — KETOROLAC TROMETHAMINE 30 MG/ML
15 INJECTION, SOLUTION INTRAMUSCULAR; INTRAVENOUS ONCE
Status: COMPLETED | OUTPATIENT
Start: 2024-11-11 | End: 2024-11-11

## 2024-11-11 RX ORDER — LIDOCAINE 4 G/G
1 PATCH TOPICAL ONCE
Status: DISCONTINUED | OUTPATIENT
Start: 2024-11-11 | End: 2024-11-11 | Stop reason: HOSPADM

## 2024-11-11 RX ADMIN — KETOROLAC TROMETHAMINE 15 MG: 30 INJECTION, SOLUTION INTRAMUSCULAR at 08:26

## 2024-11-11 RX ADMIN — HYDROCODONE BITARTRATE AND ACETAMINOPHEN 1 TABLET: 5; 325 TABLET ORAL at 11:15

## 2024-11-11 ASSESSMENT — PAIN DESCRIPTION - FREQUENCY: FREQUENCY: CONTINUOUS

## 2024-11-11 ASSESSMENT — PAIN - FUNCTIONAL ASSESSMENT: PAIN_FUNCTIONAL_ASSESSMENT: 0-10

## 2024-11-11 ASSESSMENT — PAIN DESCRIPTION - ORIENTATION: ORIENTATION: RIGHT;UPPER

## 2024-11-11 ASSESSMENT — PAIN DESCRIPTION - LOCATION: LOCATION: CHEST

## 2024-11-11 ASSESSMENT — HEART SCORE: ECG: NORMAL

## 2024-11-11 ASSESSMENT — PAIN DESCRIPTION - PAIN TYPE: TYPE: ACUTE PAIN

## 2024-11-11 ASSESSMENT — PAIN DESCRIPTION - DESCRIPTORS: DESCRIPTORS: ACHING

## 2024-11-11 ASSESSMENT — PAIN DESCRIPTION - ONSET: ONSET: ON-GOING

## 2024-11-11 ASSESSMENT — PAIN SCALES - GENERAL: PAINLEVEL_OUTOF10: 9

## 2024-11-11 NOTE — ED PROVIDER NOTES
[facility] EMERGENCY DEPT    EMERGENCY MEDICINE      Pt Name: Chelsey Juarez  MRN: [id number]  Birthdate 1980  Date of evaluation: 11/11/2024  Treating Physician: Dr. Archer  Resident Physician: Barbara Jimenez MD    CHIEF COMPLAINT       Chief Complaint   Patient presents with    Chest Pain     History obtained from chart review and the patient.    HISTORY OF PRESENT ILLNESS    HPI    Chelsey Juarez is a 44 y.o. female with PMH of IBS, GERD, HLD presents to the emergency department for evaluation of right-sided chest pain.  Patient stated that she has had pain in the right side of her chest for the past 2 months but for the past 3 days the pain has worsened significantly.  Patient describes that the pain worsens with movement and cough and she also endorses shortness of breath.  She is currently getting cardiac workup and has a stress test scheduled tomorrow and has already had an echo performed.  Patient endorses a cough and stated that she has swelling in her arms and legs.  She denies any fever, nausea, vomiting, trauma, diaphoresis.    The patient has no other acute complaints at this time.    PAST MEDICAL AND SURGICAL HISTORY     Past Medical History:   Diagnosis Date    Airway obstruction     Back pain     chronic    Cystitis, interstitial     Esophageal ulcer     GERD (gastroesophageal reflux disease)     Headache(784.0)     Hyperlipidemia     Insulin resistance     Irritable bowel syndrome     Spondylolisthesis     Spondylolysis        Past Surgical History:   Procedure Laterality Date    ABDOMINAL ADHESION SURGERY  01/01/2012    Dr. Guardado    ANKLE SURGERY Right 2011    COLONOSCOPY  2005, 2011    Dr. Brooks    CYST REMOVAL  01/01/2011    right ankle-Dr. Hernandez    HYSTERECTOMY (CERVIX STATUS UNKNOWN)  2010    Dr. Guardado    OVARY REMOVAL  2010    SHOULDER ARTHROSCOPY Left 04/17/2023    LEFT SHOULDER ARTHROSCOPY, BICEP TENODESIS, DEBRIDEMENT, DISTAL CLAVICLE EXCISION performed by Maikol Joshi DO

## 2024-11-12 ENCOUNTER — HOSPITAL ENCOUNTER (OUTPATIENT)
Dept: NUCLEAR MEDICINE | Age: 44
Discharge: HOME | End: 2024-11-12
Attending: INTERNAL MEDICINE
Payer: COMMERCIAL

## 2024-11-12 ENCOUNTER — HOSPITAL ENCOUNTER (OUTPATIENT)
Age: 44
Discharge: HOME | End: 2024-11-14
Attending: INTERNAL MEDICINE
Payer: COMMERCIAL

## 2024-11-12 VITALS — WEIGHT: 212 LBS | BODY MASS INDEX: 40.02 KG/M2 | HEIGHT: 61 IN

## 2024-11-12 DIAGNOSIS — I20.9 ANGINA PECTORIS (HCC): ICD-10-CM

## 2024-11-12 LAB
ECHO BSA: 2.03 M2
NUC STRESS EJECTION FRACTION: 55 %
STRESS BASELINE DIAS BP: 72 MMHG
STRESS BASELINE HR: 75 BPM
STRESS BASELINE SYS BP: 140 MMHG
STRESS ESTIMATED WORKLOAD: 1 METS
STRESS PEAK DIAS BP: 72 MMHG
STRESS PEAK SYS BP: 143 MMHG
STRESS PERCENT HR ACHIEVED: 59 %
STRESS POST PEAK HR: 105 BPM
STRESS RATE PRESSURE PRODUCT: NORMAL BPM*MMHG
STRESS STAGE 1 BP: NORMAL MMHG
STRESS STAGE 1 DURATION: 1 MIN:SEC
STRESS STAGE 1 HR: 100 BPM
STRESS STAGE 2 BP: NORMAL MMHG
STRESS STAGE 2 DURATION: 1 MIN:SEC
STRESS STAGE 2 HR: 99 BPM
STRESS STAGE 3 BP: NORMAL MMHG
STRESS STAGE 3 DURATION: 1 MIN:SEC
STRESS STAGE 3 HR: 97 BPM
STRESS STAGE RECOVERY 1 BP: NORMAL MMHG
STRESS STAGE RECOVERY 1 DURATION: 1 MIN:SEC
STRESS STAGE RECOVERY 1 HR: 94 BPM
STRESS STAGE RECOVERY 2 BP: NORMAL MMHG
STRESS STAGE RECOVERY 2 DURATION: 1 MIN:SEC
STRESS STAGE RECOVERY 2 HR: 90 BPM
STRESS STAGE RECOVERY 3 BP: NORMAL MMHG
STRESS STAGE RECOVERY 3 DURATION: 1 MIN:SEC
STRESS STAGE RECOVERY 3 HR: 93 BPM
STRESS STAGE RECOVERY 4 BP: NORMAL MMHG
STRESS STAGE RECOVERY 4 DURATION: 2 MIN:SEC
STRESS STAGE RECOVERY 4 HR: 88 BPM
STRESS TARGET HR: 177 BPM
TID: 1.18

## 2024-11-12 PROCEDURE — 93017 CV STRESS TEST TRACING ONLY: CPT

## 2024-11-12 PROCEDURE — 3430000000 HC RX DIAGNOSTIC RADIOPHARMACEUTICAL: Performed by: INTERNAL MEDICINE

## 2024-11-12 PROCEDURE — 6360000002 HC RX W HCPCS: Performed by: INTERNAL MEDICINE

## 2024-11-12 PROCEDURE — 78452 HT MUSCLE IMAGE SPECT MULT: CPT

## 2024-11-12 PROCEDURE — A9500 TC99M SESTAMIBI: HCPCS | Performed by: INTERNAL MEDICINE

## 2024-11-12 RX ORDER — TETRAKIS(2-METHOXYISOBUTYLISOCYANIDE)COPPER(I) TETRAFLUOROBORATE 1 MG/ML
34.6 INJECTION, POWDER, LYOPHILIZED, FOR SOLUTION INTRAVENOUS
Status: COMPLETED | OUTPATIENT
Start: 2024-11-12 | End: 2024-11-12

## 2024-11-12 RX ORDER — REGADENOSON 0.08 MG/ML
0.4 INJECTION, SOLUTION INTRAVENOUS
Status: COMPLETED | OUTPATIENT
Start: 2024-11-12 | End: 2024-11-12

## 2024-11-12 RX ORDER — TETRAKIS(2-METHOXYISOBUTYLISOCYANIDE)COPPER(I) TETRAFLUOROBORATE 1 MG/ML
9.6 INJECTION, POWDER, LYOPHILIZED, FOR SOLUTION INTRAVENOUS
Status: COMPLETED | OUTPATIENT
Start: 2024-11-12 | End: 2024-11-12

## 2024-11-12 RX ADMIN — Medication 9.6 MILLICURIE: at 12:10

## 2024-11-12 RX ADMIN — Medication 34.6 MILLICURIE: at 12:57

## 2024-11-12 RX ADMIN — REGADENOSON 0.4 MG: 0.08 INJECTION, SOLUTION INTRAVENOUS at 12:57

## 2024-11-22 ENCOUNTER — HOSPITAL ENCOUNTER (OUTPATIENT)
Dept: ULTRASOUND IMAGING | Age: 44
Discharge: HOME | End: 2024-11-22
Payer: COMMERCIAL

## 2024-11-22 DIAGNOSIS — R10.11 RUQ ABDOMINAL PAIN: ICD-10-CM

## 2024-11-22 DIAGNOSIS — K76.0 FATTY LIVER: ICD-10-CM

## 2024-11-22 PROCEDURE — 76705 ECHO EXAM OF ABDOMEN: CPT

## 2024-12-04 NOTE — PROGRESS NOTES
[facility] [place] [facility]  [facility] UROLOGY  [address]  [address]  [facility] [address]  Dept: [phone]  Loc: [phone]    Visit Date: 12/5/2024        HPI:     HPI  Ms. Juarez is a 44-year-old female that presents in follow-up    Presents with concerns of IC. 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 Notes undergoing bladder installation/ rinse several times- last procedure in 2010. Has historically taken Elmiron for management of symptomatology. 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 Failed amitriptyline, as well. Cystoscopy with Dr. Steve Smith in 5/2024 was also unremarkable. CT imaging in 9/2024 was negative for acute  pathology. Ditropan and Pyridium prescribed for management of bladder pain. Pt has not taken this yet because of a recent dx of trichomonas and findings of liver dysfunction     Surgical hx remarkable for hysterectomy.       Does take high dose marijuana for management of bladder pain and back pain. She is planning to undergo a neck and back procedure.     Current Outpatient Medications   Medication Sig Dispense Refill    azithromycin (ZITHROMAX) 250 MG tablet Take 1 tablet by mouth daily      amitriptyline (ELAVIL) 10 MG tablet Take 1 tablet by mouth nightly 30 tablet 0    oxyBUTYnin (DITROPAN) 5 MG tablet Take 1 tablet by mouth 3 times daily as needed (for bladder pain) 30 tablet 0    etodolac (LODINE) 300 MG capsule Take 1 capsule by mouth in the morning and 1 capsule at noon and 1 capsule in the evening. 9 capsule 0    nicotine (NICODERM CQ) 21 MG/24HR Place 1 patch onto the skin every 24 hours      fluticasone-umeclidin-vilant (TRELEGY ELLIPTA) 200-62.5-25 MCG/ACT AEPB inhaler Inhale 1 puff into the lungs daily 1 each 11    montelukast (SINGULAIR) 10 MG tablet Take 1 tablet by mouth nightly 90 tablet 3    cyclobenzaprine (FLEXERIL) 10 MG tablet Take 1 tablet by mouth 3 times daily as

## 2024-12-05 ENCOUNTER — OFFICE VISIT (OUTPATIENT)
Dept: UROLOGY | Age: 44
End: 2024-12-05
Payer: COMMERCIAL

## 2024-12-05 VITALS — WEIGHT: 212 LBS | RESPIRATION RATE: 20 BRPM | HEIGHT: 61 IN | BODY MASS INDEX: 40.02 KG/M2

## 2024-12-05 DIAGNOSIS — N30.10 INTERSTITIAL CYSTITIS: ICD-10-CM

## 2024-12-05 DIAGNOSIS — R30.0 DYSURIA: Primary | ICD-10-CM

## 2024-12-05 LAB
BILIRUBIN, URINE: NEGATIVE
BLOOD URINE, POC: NORMAL
CHARACTER, URINE: CLEAR
COLOR, UA: YELLOW
GLUCOSE URINE: NEGATIVE MG/DL
KETONES, URINE: NEGATIVE
LEUKOCYTE CLUMPS, URINE: NEGATIVE
NITRITE, URINE: NEGATIVE
PH, URINE: 6 (ref 5–9)
PROTEIN, URINE: NEGATIVE MG/DL
SPECIFIC GRAVITY UA: 1.01 (ref 1–1.03)
UROBILINOGEN, URINE: 0.2 EU/DL (ref 0–1)

## 2024-12-05 PROCEDURE — 4004F PT TOBACCO SCREEN RCVD TLK: CPT

## 2024-12-05 PROCEDURE — 81003 URINALYSIS AUTO W/O SCOPE: CPT

## 2024-12-05 PROCEDURE — G8427 DOCREV CUR MEDS BY ELIG CLIN: HCPCS

## 2024-12-05 PROCEDURE — G8417 CALC BMI ABV UP PARAM F/U: HCPCS

## 2024-12-05 PROCEDURE — 99213 OFFICE O/P EST LOW 20 MIN: CPT

## 2024-12-05 PROCEDURE — G8484 FLU IMMUNIZE NO ADMIN: HCPCS

## 2024-12-05 NOTE — PATIENT INSTRUCTIONS
I will send your urine for additional studies and call with abnormal results.  Follow-up in 3 months or sooner if needed  Call with questions, comments, or concerns. I recommend going to the ED for further evaluation if you develop fever, chills, nausea, vomiting, chest pain, SOB, or calf pain.    The medication list included in this document is our record of what you are currently taking, including any changes that were made at today's visit.  If you find any differences when compared to your medications at home, or have any questions that were not answered at your visit, please contact the office.

## 2024-12-05 NOTE — PROGRESS NOTES
Per patient medication list is the same as previous reviewed. States she has had some health changes since last visit so never started her urology meds.

## 2024-12-06 ENCOUNTER — HOSPITAL ENCOUNTER (OUTPATIENT)
Dept: NUCLEAR MEDICINE | Age: 44
Discharge: HOME | End: 2024-12-06
Payer: COMMERCIAL

## 2024-12-06 DIAGNOSIS — K76.0 FATTY LIVER: ICD-10-CM

## 2024-12-06 DIAGNOSIS — R10.11 RUQ ABDOMINAL PAIN: ICD-10-CM

## 2024-12-06 PROCEDURE — 78227 HEPATOBIL SYST IMAGE W/DRUG: CPT

## 2024-12-06 PROCEDURE — 3430000000 HC RX DIAGNOSTIC RADIOPHARMACEUTICAL: Performed by: NURSE PRACTITIONER

## 2024-12-06 PROCEDURE — A9537 TC99M MEBROFENIN: HCPCS | Performed by: NURSE PRACTITIONER

## 2024-12-06 RX ADMIN — Medication 8.5 MILLICURIE: at 07:15

## 2024-12-10 ENCOUNTER — TELEPHONE (OUTPATIENT)
Dept: UROLOGY | Age: 44
End: 2024-12-10

## 2024-12-10 DIAGNOSIS — N30.10 INTERSTITIAL CYSTITIS: ICD-10-CM

## 2024-12-10 DIAGNOSIS — R30.0 DYSURIA: Primary | ICD-10-CM

## 2024-12-10 NOTE — TELEPHONE ENCOUNTER
Start Augmentin per sensitivities of guidance testing    No STD detected.     Repeat micro at completion    The patient should go to the ED should they develop fever, chills, nausea, vomiting, chest pain, SOB, calf pain, feelings of incomplete emptying, or should they otherwise feel they need evaluated

## 2024-12-10 NOTE — TELEPHONE ENCOUNTER
Patient advised of the test results, start augmentin, and once antibiotics completed to repeat the micro UA. She voiced understanding.

## 2024-12-18 NOTE — PROGRESS NOTES
Right eye: No discharge.         Left eye: No discharge.   Cardiovascular:      Rate and Rhythm: Normal rate and regular rhythm.      Heart sounds: Normal heart sounds.   Pulmonary:      Effort: Pulmonary effort is normal. No respiratory distress.      Breath sounds: Normal breath sounds. No wheezing, rhonchi or rales.   Chest:      Chest wall: No tenderness.   Musculoskeletal:      Cervical back: Neck supple.      Right lower leg: No edema.      Left lower leg: No edema.   Skin:     General: Skin is warm and dry.   Neurological:      General: No focal deficit present.      Mental Status: She is alert.   Psychiatric:         Mood and Affect: Mood normal.         Behavior: Behavior normal.         Thought Content: Thought content normal.            Information added by my medical assistant/LPN was reviewed today.    Electronically signed by GOLDIE Kahn CNP on 12/23/2024 at 12:08 PM

## 2024-12-20 ENCOUNTER — TELEPHONE (OUTPATIENT)
Dept: PULMONOLOGY | Age: 44
End: 2024-12-20

## 2024-12-20 NOTE — TELEPHONE ENCOUNTER
Called and set up [facility] for patients appointment for Monday. Patient will be picked up at 10:20a from [facility]. I called patient and let her know. Patient verbalized understanding.

## 2024-12-23 ENCOUNTER — HOSPITAL ENCOUNTER (OUTPATIENT)
Age: 44
Discharge: HOME | End: 2024-12-23
Payer: COMMERCIAL

## 2024-12-23 ENCOUNTER — OFFICE VISIT (OUTPATIENT)
Dept: PULMONOLOGY | Age: 44
End: 2024-12-23
Payer: COMMERCIAL

## 2024-12-23 VITALS
HEIGHT: 61 IN | BODY MASS INDEX: 40.55 KG/M2 | WEIGHT: 214.8 LBS | SYSTOLIC BLOOD PRESSURE: 136 MMHG | TEMPERATURE: 97.3 F | HEART RATE: 94 BPM | DIASTOLIC BLOOD PRESSURE: 80 MMHG | OXYGEN SATURATION: 98 %

## 2024-12-23 DIAGNOSIS — E66.01 OBESITY, MORBID, BMI 40.0-49.9: ICD-10-CM

## 2024-12-23 DIAGNOSIS — N30.10 INTERSTITIAL CYSTITIS: ICD-10-CM

## 2024-12-23 DIAGNOSIS — Z91.199 POOR COMPLIANCE WITH CONTINUOUS POSITIVE AIRWAY PRESSURE TREATMENT: ICD-10-CM

## 2024-12-23 DIAGNOSIS — G47.33 OSA ON CPAP: Primary | ICD-10-CM

## 2024-12-23 DIAGNOSIS — R30.0 DYSURIA: ICD-10-CM

## 2024-12-23 LAB
BACTERIA: NORMAL
BILIRUB UR QL STRIP: NEGATIVE
CASTS #/AREA URNS LPF: NORMAL /LPF
CASTS #/AREA URNS LPF: NORMAL /LPF
CHARACTER UR: CLEAR
CHARCOAL URNS QL MICRO: NORMAL
COLOR UR: YELLOW
CRYSTALS URNS QL MICRO: NORMAL
EPITHELIAL CELLS, UA: NORMAL /HPF
GLUCOSE UR QL STRIP.AUTO: NEGATIVE MG/DL
HGB UR QL STRIP.AUTO: NEGATIVE
KETONES UR QL STRIP.AUTO: NEGATIVE
LEUKOCYTE ESTERASE UR QL STRIP.AUTO: NEGATIVE
NITRITE UR QL STRIP.AUTO: NEGATIVE
PH UR STRIP.AUTO: 6 [PH] (ref 5–9)
PROT UR STRIP.AUTO-MCNC: NEGATIVE MG/DL
RBC #/AREA URNS HPF: NORMAL /HPF
RENAL EPI CELLS #/AREA URNS HPF: NORMAL /[HPF]
SPECIFIC GRAVITY UA: 1.01 (ref 1–1.03)
UROBILINOGEN, URINE: 0.2 EU/DL (ref 0–1)
WBC #/AREA URNS HPF: NORMAL /HPF
YEAST LIKE FUNGI URNS QL MICRO: NORMAL

## 2024-12-23 PROCEDURE — G8427 DOCREV CUR MEDS BY ELIG CLIN: HCPCS

## 2024-12-23 PROCEDURE — 81001 URINALYSIS AUTO W/SCOPE: CPT

## 2024-12-23 PROCEDURE — G8417 CALC BMI ABV UP PARAM F/U: HCPCS

## 2024-12-23 PROCEDURE — 99214 OFFICE O/P EST MOD 30 MIN: CPT

## 2024-12-23 PROCEDURE — G8484 FLU IMMUNIZE NO ADMIN: HCPCS

## 2024-12-23 PROCEDURE — 4004F PT TOBACCO SCREEN RCVD TLK: CPT

## 2024-12-23 ASSESSMENT — ENCOUNTER SYMPTOMS
COUGH: 0
ABDOMINAL PAIN: 1
SINUS PRESSURE: 0
TROUBLE SWALLOWING: 1
WHEEZING: 0
SORE THROAT: 0
SINUS PAIN: 0
SHORTNESS OF BREATH: 0
RHINORRHEA: 0

## 2025-01-20 ENCOUNTER — OFFICE VISIT (OUTPATIENT)
Dept: CARDIOLOGY CLINIC | Age: 45
End: 2025-01-20
Payer: COMMERCIAL

## 2025-01-20 VITALS
HEART RATE: 97 BPM | DIASTOLIC BLOOD PRESSURE: 81 MMHG | BODY MASS INDEX: 40.4 KG/M2 | HEIGHT: 61 IN | WEIGHT: 214 LBS | SYSTOLIC BLOOD PRESSURE: 122 MMHG

## 2025-01-20 DIAGNOSIS — R06.02 SHORTNESS OF BREATH: Primary | ICD-10-CM

## 2025-01-20 PROCEDURE — 4004F PT TOBACCO SCREEN RCVD TLK: CPT | Performed by: INTERNAL MEDICINE

## 2025-01-20 PROCEDURE — G8417 CALC BMI ABV UP PARAM F/U: HCPCS | Performed by: INTERNAL MEDICINE

## 2025-01-20 PROCEDURE — 99214 OFFICE O/P EST MOD 30 MIN: CPT | Performed by: INTERNAL MEDICINE

## 2025-01-20 PROCEDURE — 93000 ELECTROCARDIOGRAM COMPLETE: CPT | Performed by: INTERNAL MEDICINE

## 2025-01-20 PROCEDURE — G8427 DOCREV CUR MEDS BY ELIG CLIN: HCPCS | Performed by: INTERNAL MEDICINE

## 2025-01-20 RX ORDER — DIAZEPAM 5 MG/1
TABLET ORAL
COMMUNITY
Start: 2025-01-10
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
## 2025-01-20 NOTE — PROGRESS NOTES
3 month f/u , needs pre-op clearance OIO Dr. Benjamin 3-21-25    EKG done today    Has concerns she has vascular issues with her legs    C/o chest tightening, sob, BLE - pt states she has nerve damage on left leg, she will get calf pain, patient has gained 30 pounds - pt has been taking steroids.    Denies palpitations

## 2025-02-01 ENCOUNTER — HOSPITAL ENCOUNTER (EMERGENCY)
Age: 45
Discharge: HOME | End: 2025-02-01
Attending: FAMILY MEDICINE
Payer: COMMERCIAL

## 2025-02-01 VITALS
HEIGHT: 61 IN | BODY MASS INDEX: 40.59 KG/M2 | OXYGEN SATURATION: 98 % | WEIGHT: 215 LBS | SYSTOLIC BLOOD PRESSURE: 136 MMHG | HEART RATE: 90 BPM | DIASTOLIC BLOOD PRESSURE: 102 MMHG | RESPIRATION RATE: 16 BRPM | TEMPERATURE: 97.6 F

## 2025-02-01 DIAGNOSIS — K62.89 RECTAL PAIN: ICD-10-CM

## 2025-02-01 DIAGNOSIS — K64.9 HEMORRHOIDS, UNSPECIFIED HEMORRHOID TYPE: Primary | ICD-10-CM

## 2025-02-01 PROCEDURE — 99283 EMERGENCY DEPT VISIT LOW MDM: CPT

## 2025-02-01 PROCEDURE — 46083 INC THROMBOSED HROID XTRNL: CPT

## 2025-02-01 RX ORDER — LIDOCAINE HYDROCHLORIDE AND EPINEPHRINE BITARTRATE 20; .01 MG/ML; MG/ML
20 INJECTION, SOLUTION SUBCUTANEOUS ONCE
Status: DISCONTINUED | OUTPATIENT
Start: 2025-02-01 | End: 2025-02-01 | Stop reason: HOSPADM

## 2025-02-01 RX ORDER — HYDROCODONE BITARTRATE AND ACETAMINOPHEN 5; 325 MG/1; MG/1
1 TABLET ORAL EVERY 6 HOURS PRN
Qty: 12 TABLET | Refills: 0 | Status: SHIPPED | OUTPATIENT
Start: 2025-02-01 | End: 2025-02-04

## 2025-02-01 ASSESSMENT — PAIN DESCRIPTION - PAIN TYPE: TYPE: ACUTE PAIN

## 2025-02-01 ASSESSMENT — PAIN - FUNCTIONAL ASSESSMENT: PAIN_FUNCTIONAL_ASSESSMENT: 0-10

## 2025-02-01 ASSESSMENT — PAIN SCALES - GENERAL: PAINLEVEL_OUTOF10: 9

## 2025-02-01 ASSESSMENT — PAIN DESCRIPTION - LOCATION: LOCATION: RECTUM

## 2025-02-01 NOTE — ED PROVIDER NOTES
[facility] EMERGENCY DEPARTMENT  [address] [address]  Dept: [phone]  Loc: [phone]     EMERGENCY DEPARTMENT ENCOUNTER     CHIEF COMPLAINT   Chief Complaint   Patient presents with    Hemorrhoids        HPI   Chelsey Juarez is a 44 y.o. female with a prior hx of external hemorrhoids, who presents with rectal pain since the onset today. The context is that the symptoms started this past week. The pain is associated with external hemorrhoids. The bleeding and/or pain is 10/10 in severity. The quality of the pain is sharp.     REVIEW OF SYSTEMS  GI: no vomiting, no abdominal pain, no diarrhea, no hematochezia   Pulmonary: No difficulty breathing   General: No fevers   : No hematuria or dysuria   All other systems reviewed and are negative.     PAST MEDICAL & SURGICAL HISTORY   Past Medical History:   Diagnosis Date    Airway obstruction     Back pain     chronic    Cystitis, interstitial     Esophageal ulcer     GERD (gastroesophageal reflux disease)     Headache(784.0)     Hyperlipidemia     Insulin resistance     Irritable bowel syndrome     Spondylolisthesis     Spondylolysis       Past Surgical History:   Procedure Laterality Date    ABDOMINAL ADHESION SURGERY  01/01/2012    Dr. Guardado    ANKLE SURGERY Right 2011    COLONOSCOPY  2005, 2011    Dr. Brooks    CYST REMOVAL  01/01/2011    right ankle-Dr. Hernandez    HYSTERECTOMY (CERVIX STATUS UNKNOWN)  2010    Dr. Guardado    OVARY REMOVAL  2010    SHOULDER ARTHROSCOPY Left 04/17/2023    LEFT SHOULDER ARTHROSCOPY, BICEP TENODESIS, DEBRIDEMENT, DISTAL CLAVICLE EXCISION performed by Maikol Joshi DO at [facility] OR    TONSILLECTOMY AND ADENOIDECTOMY  01/01/1987    UPPER GASTROINTESTINAL ENDOSCOPY  06/2024    early june per patient    WISDOM TOOTH EXTRACTION  01/01/1996        CURRENT MEDICATIONS   Previous Medications    ALBUTEROL (PROVENTIL) (2.5 MG/3ML) 0.083% NEBULIZER SOLUTION    Take 3 mLs by

## 2025-02-01 NOTE — ED TRIAGE NOTES
Patient presents to ER with complaints of hemorrhoid that started yesterday. Patient reports hx of having this same hemorrhoid and has had it lanced in the past. Patient reports she has tried to take a bath with epsom salt and tried to get it to go away.   Please approve pending order if appropriate.

## 2025-02-01 NOTE — DISCHARGE INSTRUCTIONS
WE EXCISED SOME CLOTS FROM YOUR THROMBOSED HEMORRHOID TODAY.    THERE WILL BE SOME RESIDUAL BLEEDING OVER THE NEXT COUPLE OF DAYS.    CONTINUE WITH SITZ BATH AND EPSOM SALT, DONUT PILLOW TO OFFLOAD PRESSURE.    TAKE NORCO FOR INTERMITTENT BREAKTHROUGH PAIN CONTROL. DO NOT DRIVE WHEN TAKING NARCOTIC MEDICATIONS.    CONTINUE DRINKING PLENTY OF CLEAR LIQUIDS, STOOL SOFTENER OR EVEN APPLY OVER THE COUNTER HEMORRHOID CREAM.

## 2025-02-02 ENCOUNTER — HOSPITAL ENCOUNTER (EMERGENCY)
Age: 45
Discharge: HOME | End: 2025-02-03
Attending: EMERGENCY MEDICINE
Payer: COMMERCIAL

## 2025-02-02 VITALS
OXYGEN SATURATION: 97 % | RESPIRATION RATE: 18 BRPM | TEMPERATURE: 98 F | DIASTOLIC BLOOD PRESSURE: 86 MMHG | HEART RATE: 96 BPM | SYSTOLIC BLOOD PRESSURE: 149 MMHG

## 2025-02-02 DIAGNOSIS — K64.9 HEMORRHOIDS, UNSPECIFIED HEMORRHOID TYPE: Primary | ICD-10-CM

## 2025-02-02 PROCEDURE — 99283 EMERGENCY DEPT VISIT LOW MDM: CPT

## 2025-02-02 ASSESSMENT — PAIN - FUNCTIONAL ASSESSMENT: PAIN_FUNCTIONAL_ASSESSMENT: 0-10

## 2025-02-02 ASSESSMENT — PAIN SCALES - GENERAL: PAINLEVEL_OUTOF10: 9

## 2025-02-03 ENCOUNTER — TELEPHONE (OUTPATIENT)
Dept: SURGERY | Age: 45
End: 2025-02-03

## 2025-02-03 PROCEDURE — 6370000000 HC RX 637 (ALT 250 FOR IP): Performed by: EMERGENCY MEDICINE

## 2025-02-03 RX ORDER — HYDROCODONE BITARTRATE AND ACETAMINOPHEN 5; 325 MG/1; MG/1
1 TABLET ORAL ONCE
Status: COMPLETED | OUTPATIENT
Start: 2025-02-03 | End: 2025-02-03

## 2025-02-03 RX ORDER — MINERAL OIL, PETROLATUM, PHENYLEPHRINE HCL 2.5; 140; 749 MG/G; MG/G; MG/G
OINTMENT TOPICAL 2 TIMES DAILY PRN
Qty: 28 G | Refills: 0 | Status: SHIPPED | OUTPATIENT
Start: 2025-02-03

## 2025-02-03 RX ORDER — OMEGA-3S/DHA/EPA/FISH OIL 1000-1400
2 CAPSULE,DELAYED RELEASE (ENTERIC COATED) ORAL DAILY
Qty: 60 TABLET | Refills: 5 | Status: SHIPPED | OUTPATIENT
Start: 2025-02-03 | End: 2026-02-03

## 2025-02-03 RX ORDER — LIDOCAINE HYDROCHLORIDE 20 MG/ML
JELLY TOPICAL PRN
Status: DISCONTINUED | OUTPATIENT
Start: 2025-02-03 | End: 2025-02-03 | Stop reason: HOSPADM

## 2025-02-03 RX ORDER — BETAMETHASONE VALERATE 1.2 MG/G
CREAM TOPICAL
Qty: 15 G | Refills: 0 | Status: SHIPPED | OUTPATIENT
Start: 2025-02-03 | End: 2025-02-10

## 2025-02-03 RX ORDER — SENNA AND DOCUSATE SODIUM 50; 8.6 MG/1; MG/1
1 TABLET, FILM COATED ORAL DAILY
Qty: 30 TABLET | Refills: 0 | Status: SHIPPED | OUTPATIENT
Start: 2025-02-03 | End: 2025-03-05

## 2025-02-03 RX ADMIN — HYDROCODONE BITARTRATE AND ACETAMINOPHEN 1 TABLET: 5; 325 TABLET ORAL at 00:21

## 2025-02-03 RX ADMIN — LIDOCAINE HYDROCHLORIDE: 20 JELLY TOPICAL at 00:20

## 2025-02-03 NOTE — DISCHARGE INSTRUCTIONS
Return to the emergency department immediately if there is any new or concerning symptom.  Follow-up with the gastroenterologist or surgeon.  Use the phone numbers on this discharge instructions to request an appointment.

## 2025-02-03 NOTE — ED PROVIDER NOTES
[facility] EMERGENCY DEPARTMENT  EMERGENCY DEPARTMENT ENCOUNTER          Pt Name: Chelsey Juarez  MRN: [id number]  Birthdate 1980  Date of evaluation: 2/2/2025  Physician: Mundo Moncada MD, FACEP, FAAEM, FAWM      CHIEF COMPLAINT       Chief Complaint   Patient presents with    Hemorrhoids         HISTORY OF PRESENT ILLNESS    HPI  Chelsey Juarez is a 44 y.o. female who presents to the emergency department from home, by private vehicle for evaluation of painful hemorrhoid.  Patient was seen 2 days ago in the emergency department for hemorrhoidal pain, received incision with resolution of pain.  Patient had some bleeding afterwards and bleeding seems to have stopped since then.  States that today she felt her hemorrhoids to be big and wanted to get checked again.  Denies additional bleeding.  The patient has no other acute complaints at this time.      PAST MEDICAL AND SURGICAL HISTORY     Past Medical History:   Diagnosis Date    Airway obstruction     Back pain     chronic    Cystitis, interstitial     Esophageal ulcer     GERD (gastroesophageal reflux disease)     Headache(784.0)     Hyperlipidemia     Insulin resistance     Irritable bowel syndrome     Spondylolisthesis     Spondylolysis      Past Surgical History:   Procedure Laterality Date    ABDOMINAL ADHESION SURGERY  01/01/2012    Dr. Guardado    ANKLE SURGERY Right 2011    COLONOSCOPY  2005, 2011    Dr. Brooks    CYST REMOVAL  01/01/2011    right ankle-Dr. Hernandez    HYSTERECTOMY (CERVIX STATUS UNKNOWN)  2010    Dr. Guardado    OVARY REMOVAL  2010    SHOULDER ARTHROSCOPY Left 04/17/2023    LEFT SHOULDER ARTHROSCOPY, BICEP TENODESIS, DEBRIDEMENT, DISTAL CLAVICLE EXCISION performed by Maikol Joshi DO at [facility] OR    TONSILLECTOMY AND ADENOIDECTOMY  01/01/1987    UPPER GASTROINTESTINAL ENDOSCOPY  06/2024    early june per patient    WISDOM TOOTH EXTRACTION  01/01/1996         MEDICATIONS     Current Facility-Administered

## 2025-02-03 NOTE — TELEPHONE ENCOUNTER
Called patient to schedule ER follow up, patient already scheduled with her GI doctor. Patient said she would call later if she needed to schedule an appt.

## 2025-02-03 NOTE — ED TRIAGE NOTES
Pt arrives to ED via private for evaluation of rectal pain and hemorrhoids. Pt reports she had a hemorrhoid lanced here yesterday and is now having pain. Pt believes it is clotted

## 2025-02-05 ENCOUNTER — OFFICE VISIT (OUTPATIENT)
Dept: SURGERY | Age: 45
End: 2025-02-05
Payer: COMMERCIAL

## 2025-02-05 ENCOUNTER — PREP FOR PROCEDURE (OUTPATIENT)
Dept: SURGERY | Age: 45
End: 2025-02-05

## 2025-02-05 VITALS
DIASTOLIC BLOOD PRESSURE: 84 MMHG | HEART RATE: 100 BPM | RESPIRATION RATE: 18 BRPM | SYSTOLIC BLOOD PRESSURE: 118 MMHG | OXYGEN SATURATION: 98 % | TEMPERATURE: 97.5 F

## 2025-02-05 DIAGNOSIS — K64.5 THROMBOSED HEMORRHOIDS: Primary | ICD-10-CM

## 2025-02-05 PROCEDURE — 99204 OFFICE O/P NEW MOD 45 MIN: CPT | Performed by: SURGERY

## 2025-02-05 PROCEDURE — G8417 CALC BMI ABV UP PARAM F/U: HCPCS | Performed by: SURGERY

## 2025-02-05 PROCEDURE — G8427 DOCREV CUR MEDS BY ELIG CLIN: HCPCS | Performed by: SURGERY

## 2025-02-05 PROCEDURE — 4004F PT TOBACCO SCREEN RCVD TLK: CPT | Performed by: SURGERY

## 2025-02-05 RX ORDER — HYDROCODONE BITARTRATE AND ACETAMINOPHEN 5; 325 MG/1; MG/1
1 TABLET ORAL EVERY 8 HOURS PRN
Qty: 21 TABLET | Refills: 0 | Status: SHIPPED | OUTPATIENT
Start: 2025-02-05 | End: 2025-02-12

## 2025-02-05 RX ORDER — SODIUM CHLORIDE 9 MG/ML
INJECTION, SOLUTION INTRAVENOUS PRN
Status: CANCELLED | OUTPATIENT
Start: 2025-02-05

## 2025-02-05 RX ORDER — SODIUM CHLORIDE 0.9 % (FLUSH) 0.9 %
5-40 SYRINGE (ML) INJECTION PRN
Status: CANCELLED | OUTPATIENT
Start: 2025-02-05

## 2025-02-05 RX ORDER — SODIUM CHLORIDE 0.9 % (FLUSH) 0.9 %
5-40 SYRINGE (ML) INJECTION EVERY 12 HOURS SCHEDULED
Status: CANCELLED | OUTPATIENT
Start: 2025-02-05

## 2025-02-05 RX ORDER — SODIUM CHLORIDE 9 MG/ML
INJECTION, SOLUTION INTRAVENOUS CONTINUOUS
Status: CANCELLED | OUTPATIENT
Start: 2025-02-05

## 2025-02-05 ASSESSMENT — ENCOUNTER SYMPTOMS
CHOKING: 0
RECTAL PAIN: 1
BACK PAIN: 1
RHINORRHEA: 0
WHEEZING: 1
CONSTIPATION: 0
ABDOMINAL DISTENTION: 0
NAUSEA: 1
TROUBLE SWALLOWING: 0
CHEST TIGHTNESS: 0
SHORTNESS OF BREATH: 0
ALLERGIC/IMMUNOLOGIC NEGATIVE: 1
EYE REDNESS: 0
EYE PAIN: 0
SORE THROAT: 0
VOMITING: 0
APNEA: 1
ANAL BLEEDING: 0
DIARRHEA: 0
EYE ITCHING: 0
PHOTOPHOBIA: 1
COLOR CHANGE: 0
EYE DISCHARGE: 0
STRIDOR: 0
FACIAL SWELLING: 0
VOICE CHANGE: 0
SINUS PRESSURE: 0
BLOOD IN STOOL: 0
COUGH: 0
ABDOMINAL PAIN: 0

## 2025-02-05 NOTE — H&P
Chelsey Juarez (:  1980)      ASSESSMENT:  1.  Thrombosed external hemorrhoid     PLAN:  1. Schedule Chelsey for anal exam under anesthesia with hemorrhoidectomy.  2. She will undergo pre-operative clearance per anesthesia guidelines with risk factors listed under the past medical history diagnosis & problem list.  3. The risks, benefits and alternatives were discussed with Chelsey including non-operative management. All questions answered. She understands and wishes to proceed with surgical intervention.  4. Restrictions discussed with Chelsey and she expresses understanding.  5. She is advised to call back directly if there are further questions/concerns, or if her symptoms worsen prior to surgery.  6.  Sitz bath's as needed  7.  Hemorrhoidal suppositories/cream as needed  8.  Follow-up with GI as directed     SUBJECTIVE/OBJECTIVE:          Chief Complaint   Patient presents with    Surgical Consult       Est pt last seen 12 refer Teresa Lewis CNP-Thrombosed hemorrhoid      HPI  Chelsey is a 44-year-old female who presents for evaluation of symptomatic external hemorrhoid.  She has had issues with these since .  At that time she had it lanced.  I had seen her back in  for a resolving thrombosed hemorrhoid that was managed conservatively.  She states she then has actually done really well until recently.  She became dehydrated which caused significant constipation.  This caused a thrombosed external hemorrhoid.  She states symptoms have been going on now for about 8-9 days.  She then went to the emergency department last Saturday.  She states they then lanced the thrombosed hemorrhoid.  After visit she then had a fair amount of bleeding at home.  Symptoms unfortunately have worsened again.  Severe pain.  Difficult to sit down.  Has been doing sitz bath's as well as hemorrhoidal suppositories/creams.  Has really not seen any improvement and if anything may be some worsening.  Admits she does have a    Patient Active Problem List   Diagnosis    Metabolic syndrome    Papilledema    Weight gain    Migraine    Abdominal pain    PCO (polycystic ovaries)    Abdominal adhesions    Thrombosed hemorrhoids          An electronic signature was used to authenticate this note.     --Raffi Vines MD

## 2025-02-06 ENCOUNTER — ANESTHESIA EVENT (OUTPATIENT)
Dept: OPERATING ROOM | Age: 45
End: 2025-02-06
Payer: COMMERCIAL

## 2025-02-06 ENCOUNTER — ANESTHESIA (OUTPATIENT)
Dept: OPERATING ROOM | Age: 45
End: 2025-02-06
Payer: COMMERCIAL

## 2025-02-06 ENCOUNTER — HOSPITAL ENCOUNTER (OUTPATIENT)
Age: 45
Setting detail: OUTPATIENT SURGERY
Discharge: HOME | End: 2025-02-06
Attending: SURGERY | Admitting: SURGERY
Payer: COMMERCIAL

## 2025-02-06 VITALS
TEMPERATURE: 96.6 F | WEIGHT: 214.4 LBS | SYSTOLIC BLOOD PRESSURE: 138 MMHG | OXYGEN SATURATION: 99 % | HEART RATE: 88 BPM | DIASTOLIC BLOOD PRESSURE: 84 MMHG | HEIGHT: 61 IN | BODY MASS INDEX: 40.48 KG/M2 | RESPIRATION RATE: 16 BRPM

## 2025-02-06 DIAGNOSIS — K64.5 THROMBOSED HEMORRHOIDS: ICD-10-CM

## 2025-02-06 PROCEDURE — 3600000002 HC SURGERY LEVEL 2 BASE: Performed by: SURGERY

## 2025-02-06 PROCEDURE — 6360000002 HC RX W HCPCS: Performed by: NURSE ANESTHETIST, CERTIFIED REGISTERED

## 2025-02-06 PROCEDURE — 2580000003 HC RX 258: Performed by: NURSE PRACTITIONER

## 2025-02-06 PROCEDURE — 6370000000 HC RX 637 (ALT 250 FOR IP): Performed by: NURSE PRACTITIONER

## 2025-02-06 PROCEDURE — 3700000000 HC ANESTHESIA ATTENDED CARE: Performed by: SURGERY

## 2025-02-06 PROCEDURE — 3700000001 HC ADD 15 MINUTES (ANESTHESIA): Performed by: SURGERY

## 2025-02-06 PROCEDURE — 2500000003 HC RX 250 WO HCPCS: Performed by: SURGERY

## 2025-02-06 PROCEDURE — 7100000001 HC PACU RECOVERY - ADDTL 15 MIN: Performed by: SURGERY

## 2025-02-06 PROCEDURE — 2720000010 HC SURG SUPPLY STERILE: Performed by: SURGERY

## 2025-02-06 PROCEDURE — 2709999900 HC NON-CHARGEABLE SUPPLY: Performed by: SURGERY

## 2025-02-06 PROCEDURE — 6370000000 HC RX 637 (ALT 250 FOR IP): Performed by: SURGERY

## 2025-02-06 PROCEDURE — 2500000003 HC RX 250 WO HCPCS: Performed by: NURSE PRACTITIONER

## 2025-02-06 PROCEDURE — 2500000003 HC RX 250 WO HCPCS: Performed by: NURSE ANESTHETIST, CERTIFIED REGISTERED

## 2025-02-06 PROCEDURE — 6370000000 HC RX 637 (ALT 250 FOR IP): Performed by: ANESTHESIOLOGY

## 2025-02-06 PROCEDURE — 7100000000 HC PACU RECOVERY - FIRST 15 MIN: Performed by: SURGERY

## 2025-02-06 PROCEDURE — 7100000011 HC PHASE II RECOVERY - ADDTL 15 MIN: Performed by: SURGERY

## 2025-02-06 PROCEDURE — 3600000012 HC SURGERY LEVEL 2 ADDTL 15MIN: Performed by: SURGERY

## 2025-02-06 PROCEDURE — 6360000002 HC RX W HCPCS: Performed by: ANESTHESIOLOGY

## 2025-02-06 PROCEDURE — 94640 AIRWAY INHALATION TREATMENT: CPT

## 2025-02-06 PROCEDURE — 7100000010 HC PHASE II RECOVERY - FIRST 15 MIN: Performed by: SURGERY

## 2025-02-06 PROCEDURE — 88304 TISSUE EXAM BY PATHOLOGIST: CPT

## 2025-02-06 PROCEDURE — 6360000002 HC RX W HCPCS: Performed by: NURSE PRACTITIONER

## 2025-02-06 RX ORDER — SODIUM CHLORIDE 9 MG/ML
INJECTION, SOLUTION INTRAVENOUS PRN
Status: CANCELLED | OUTPATIENT
Start: 2025-02-06

## 2025-02-06 RX ORDER — SODIUM CHLORIDE 0.9 % (FLUSH) 0.9 %
5-40 SYRINGE (ML) INJECTION EVERY 12 HOURS SCHEDULED
Status: CANCELLED | OUTPATIENT
Start: 2025-02-06

## 2025-02-06 RX ORDER — SODIUM CHLORIDE 0.9 % (FLUSH) 0.9 %
5-40 SYRINGE (ML) INJECTION PRN
Status: CANCELLED | OUTPATIENT
Start: 2025-02-06

## 2025-02-06 RX ORDER — ROCURONIUM BROMIDE 10 MG/ML
INJECTION, SOLUTION INTRAVENOUS
Status: DISCONTINUED | OUTPATIENT
Start: 2025-02-06 | End: 2025-02-06 | Stop reason: SDUPTHER

## 2025-02-06 RX ORDER — SODIUM CHLORIDE 0.9 % (FLUSH) 0.9 %
5-40 SYRINGE (ML) INJECTION EVERY 12 HOURS SCHEDULED
Status: DISCONTINUED | OUTPATIENT
Start: 2025-02-06 | End: 2025-02-06 | Stop reason: HOSPADM

## 2025-02-06 RX ORDER — BUPIVACAINE HYDROCHLORIDE AND EPINEPHRINE 5; 5 MG/ML; UG/ML
INJECTION, SOLUTION EPIDURAL; INTRACAUDAL; PERINEURAL PRN
Status: DISCONTINUED | OUTPATIENT
Start: 2025-02-06 | End: 2025-02-06 | Stop reason: ALTCHOICE

## 2025-02-06 RX ORDER — ONDANSETRON 2 MG/ML
INJECTION INTRAMUSCULAR; INTRAVENOUS
Status: DISCONTINUED | OUTPATIENT
Start: 2025-02-06 | End: 2025-02-06 | Stop reason: SDUPTHER

## 2025-02-06 RX ORDER — DIBUCAINE 1 G/100G
OINTMENT TOPICAL PRN
Status: DISCONTINUED | OUTPATIENT
Start: 2025-02-06 | End: 2025-02-06 | Stop reason: ALTCHOICE

## 2025-02-06 RX ORDER — SODIUM CHLORIDE 0.9 % (FLUSH) 0.9 %
5-40 SYRINGE (ML) INJECTION PRN
Status: DISCONTINUED | OUTPATIENT
Start: 2025-02-06 | End: 2025-02-06 | Stop reason: HOSPADM

## 2025-02-06 RX ORDER — MIDAZOLAM HYDROCHLORIDE 1 MG/ML
INJECTION, SOLUTION INTRAMUSCULAR; INTRAVENOUS
Status: DISCONTINUED | OUTPATIENT
Start: 2025-02-06 | End: 2025-02-06 | Stop reason: SDUPTHER

## 2025-02-06 RX ORDER — ONDANSETRON 2 MG/ML
4 INJECTION INTRAMUSCULAR; INTRAVENOUS EVERY 6 HOURS PRN
Status: CANCELLED | OUTPATIENT
Start: 2025-02-06

## 2025-02-06 RX ORDER — KETOROLAC TROMETHAMINE 10 MG/1
10 TABLET, FILM COATED ORAL EVERY 6 HOURS PRN
Qty: 20 TABLET | Refills: 0 | Status: SHIPPED | OUTPATIENT
Start: 2025-02-06

## 2025-02-06 RX ORDER — NALOXONE HYDROCHLORIDE 0.4 MG/ML
INJECTION, SOLUTION INTRAMUSCULAR; INTRAVENOUS; SUBCUTANEOUS PRN
Status: DISCONTINUED | OUTPATIENT
Start: 2025-02-06 | End: 2025-02-06 | Stop reason: HOSPADM

## 2025-02-06 RX ORDER — SODIUM CHLORIDE 9 MG/ML
INJECTION, SOLUTION INTRAVENOUS PRN
Status: DISCONTINUED | OUTPATIENT
Start: 2025-02-06 | End: 2025-02-06 | Stop reason: HOSPADM

## 2025-02-06 RX ORDER — HYDROCODONE BITARTRATE AND ACETAMINOPHEN 5; 325 MG/1; MG/1
2 TABLET ORAL EVERY 4 HOURS PRN
Status: DISCONTINUED | OUTPATIENT
Start: 2025-02-06 | End: 2025-02-06 | Stop reason: HOSPADM

## 2025-02-06 RX ORDER — DEXAMETHASONE SODIUM PHOSPHATE 10 MG/ML
INJECTION, EMULSION INTRAMUSCULAR; INTRAVENOUS
Status: DISCONTINUED | OUTPATIENT
Start: 2025-02-06 | End: 2025-02-06 | Stop reason: SDUPTHER

## 2025-02-06 RX ORDER — PROPOFOL 10 MG/ML
INJECTION, EMULSION INTRAVENOUS
Status: DISCONTINUED | OUTPATIENT
Start: 2025-02-06 | End: 2025-02-06 | Stop reason: SDUPTHER

## 2025-02-06 RX ORDER — KETOROLAC TROMETHAMINE 30 MG/ML
30 INJECTION, SOLUTION INTRAMUSCULAR; INTRAVENOUS EVERY 6 HOURS PRN
Status: CANCELLED | OUTPATIENT
Start: 2025-02-06 | End: 2025-02-11

## 2025-02-06 RX ORDER — HYDROCODONE BITARTRATE AND ACETAMINOPHEN 5; 325 MG/1; MG/1
1 TABLET ORAL EVERY 4 HOURS PRN
Status: DISCONTINUED | OUTPATIENT
Start: 2025-02-06 | End: 2025-02-06 | Stop reason: HOSPADM

## 2025-02-06 RX ORDER — IPRATROPIUM BROMIDE AND ALBUTEROL SULFATE 2.5; .5 MG/3ML; MG/3ML
1 SOLUTION RESPIRATORY (INHALATION) ONCE
Status: COMPLETED | OUTPATIENT
Start: 2025-02-06 | End: 2025-02-06

## 2025-02-06 RX ORDER — FENTANYL CITRATE 50 UG/ML
50 INJECTION, SOLUTION INTRAMUSCULAR; INTRAVENOUS EVERY 5 MIN PRN
Status: COMPLETED | OUTPATIENT
Start: 2025-02-06 | End: 2025-02-06

## 2025-02-06 RX ORDER — MORPHINE SULFATE 2 MG/ML
2 INJECTION, SOLUTION INTRAMUSCULAR; INTRAVENOUS
Status: CANCELLED | OUTPATIENT
Start: 2025-02-06

## 2025-02-06 RX ORDER — MORPHINE SULFATE 4 MG/ML
4 INJECTION, SOLUTION INTRAMUSCULAR; INTRAVENOUS
Status: CANCELLED | OUTPATIENT
Start: 2025-02-06

## 2025-02-06 RX ORDER — LIDOCAINE HCL/PF 100 MG/5ML
SYRINGE (ML) INJECTION
Status: DISCONTINUED | OUTPATIENT
Start: 2025-02-06 | End: 2025-02-06 | Stop reason: SDUPTHER

## 2025-02-06 RX ORDER — POLYETHYLENE GLYCOL 3350 17 G/17G
17 POWDER, FOR SOLUTION ORAL DAILY PRN
Qty: 510 G | Refills: 0 | Status: SHIPPED | OUTPATIENT
Start: 2025-02-06 | End: 2025-02-11 | Stop reason: SDUPTHER

## 2025-02-06 RX ORDER — SODIUM CHLORIDE 9 MG/ML
INJECTION, SOLUTION INTRAVENOUS CONTINUOUS
Status: DISCONTINUED | OUTPATIENT
Start: 2025-02-06 | End: 2025-02-06 | Stop reason: HOSPADM

## 2025-02-06 RX ORDER — FENTANYL CITRATE 50 UG/ML
INJECTION, SOLUTION INTRAMUSCULAR; INTRAVENOUS
Status: DISCONTINUED | OUTPATIENT
Start: 2025-02-06 | End: 2025-02-06 | Stop reason: SDUPTHER

## 2025-02-06 RX ORDER — ONDANSETRON 4 MG/1
4 TABLET, ORALLY DISINTEGRATING ORAL EVERY 8 HOURS PRN
Status: CANCELLED | OUTPATIENT
Start: 2025-02-06

## 2025-02-06 RX ADMIN — ROCURONIUM BROMIDE 50 MG: 10 INJECTION INTRAVENOUS at 12:24

## 2025-02-06 RX ADMIN — PROPOFOL 150 MG: 10 INJECTION, EMULSION INTRAVENOUS at 12:24

## 2025-02-06 RX ADMIN — FENTANYL CITRATE 50 MCG: 50 INJECTION, SOLUTION INTRAMUSCULAR; INTRAVENOUS at 13:10

## 2025-02-06 RX ADMIN — HYDROCODONE BITARTRATE AND ACETAMINOPHEN 2 TABLET: 5; 325 TABLET ORAL at 14:24

## 2025-02-06 RX ADMIN — FENTANYL CITRATE 100 MCG: 50 INJECTION, SOLUTION INTRAMUSCULAR; INTRAVENOUS at 12:24

## 2025-02-06 RX ADMIN — SODIUM CHLORIDE: 9 INJECTION, SOLUTION INTRAVENOUS at 10:04

## 2025-02-06 RX ADMIN — Medication 30 MG: at 12:29

## 2025-02-06 RX ADMIN — DEXAMETHASONE SODIUM PHOSPHATE 10 MG: 10 INJECTION, EMULSION INTRAMUSCULAR; INTRAVENOUS at 12:34

## 2025-02-06 RX ADMIN — Medication 20 MG: at 12:34

## 2025-02-06 RX ADMIN — IPRATROPIUM BROMIDE AND ALBUTEROL SULFATE 1 DOSE: .5; 3 SOLUTION RESPIRATORY (INHALATION) at 10:21

## 2025-02-06 RX ADMIN — SUGAMMADEX 200 MG: 100 INJECTION, SOLUTION INTRAVENOUS at 12:45

## 2025-02-06 RX ADMIN — WATER 2000 MG: 1 INJECTION INTRAMUSCULAR; INTRAVENOUS; SUBCUTANEOUS at 12:16

## 2025-02-06 RX ADMIN — FENTANYL CITRATE 50 MCG: 50 INJECTION, SOLUTION INTRAMUSCULAR; INTRAVENOUS at 13:05

## 2025-02-06 RX ADMIN — ONDANSETRON 4 MG: 2 INJECTION, SOLUTION INTRAMUSCULAR; INTRAVENOUS at 12:40

## 2025-02-06 RX ADMIN — MIDAZOLAM 2 MG: 1 INJECTION INTRAMUSCULAR; INTRAVENOUS at 12:16

## 2025-02-06 RX ADMIN — Medication 100 MG: at 12:24

## 2025-02-06 ASSESSMENT — PAIN DESCRIPTION - DESCRIPTORS
DESCRIPTORS: BURNING;DISCOMFORT
DESCRIPTORS: ACHING
DESCRIPTORS: TENDER;SORE;DISCOMFORT
DESCRIPTORS: BURNING;ACHING
DESCRIPTORS: BURNING;ACHING

## 2025-02-06 ASSESSMENT — PAIN DESCRIPTION - ORIENTATION
ORIENTATION: INNER

## 2025-02-06 ASSESSMENT — PAIN DESCRIPTION - PAIN TYPE
TYPE: ACUTE PAIN;SURGICAL PAIN
TYPE: SURGICAL PAIN

## 2025-02-06 ASSESSMENT — PAIN SCALES - GENERAL
PAINLEVEL_OUTOF10: 8
PAINLEVEL_OUTOF10: 8
PAINLEVEL_OUTOF10: 6
PAINLEVEL_OUTOF10: 7
PAINLEVEL_OUTOF10: 5

## 2025-02-06 ASSESSMENT — PAIN - FUNCTIONAL ASSESSMENT
PAIN_FUNCTIONAL_ASSESSMENT: 0-10
PAIN_FUNCTIONAL_ASSESSMENT: ACTIVITIES ARE NOT PREVENTED
PAIN_FUNCTIONAL_ASSESSMENT: ACTIVITIES ARE NOT PREVENTED

## 2025-02-06 ASSESSMENT — PAIN DESCRIPTION - LOCATION
LOCATION: BUTTOCKS
LOCATION: THROAT;RECTUM

## 2025-02-06 ASSESSMENT — PAIN DESCRIPTION - ONSET
ONSET: ON-GOING
ONSET: ON-GOING

## 2025-02-06 ASSESSMENT — LIFESTYLE VARIABLES: SMOKING_STATUS: 1

## 2025-02-06 ASSESSMENT — PAIN DESCRIPTION - FREQUENCY
FREQUENCY: CONTINUOUS
FREQUENCY: CONTINUOUS

## 2025-02-06 NOTE — INTERVAL H&P NOTE
Update History & Physical    The patient's History and Physical was reviewed with the patient and I examined the patient. There was no change. The surgical site was confirmed by the patient and me.     Plan: The risks, benefits, expected outcome, and alternative to the recommended procedure have been discussed with the patient. Patient understands and wants to proceed with the procedure.     Electronically signed by Raffi Vines MD on 2/6/2025 at 9:26 AM

## 2025-02-06 NOTE — ANESTHESIA PRE PROCEDURE
Department of Anesthesiology  Preprocedure Note       Name:  Chelsey Juarez   Age:  44 y.o.  :  1980                                          MRN:  [id number]         Date:  2025      Surgeon: Surgeon(s):  Raffi Vines MD    Procedure: Procedure(s):  Excision thrombosed hemorrhoid    Medications prior to admission:   Prior to Admission medications    Medication Sig Start Date End Date Taking? Authorizing Provider   HYDROcodone-acetaminophen (NORCO) 5-325 MG per tablet Take 1 tablet by mouth every 8 hours as needed for Pain for up to 7 days. Intended supply: 7 days. Take lowest dose possible to manage pain Max Daily Amount: 3 tablets 25 Yes Chris Lambert APRN - CNP   phenylephrine-mineral oil-petrolatum (PREPARATION H) 0.25-14-74.9 % rectal ointment Place rectally 2 times daily as needed for Hemorrhoids Apply externally at the swollen hemorrhoid 2/3/25  Yes Mundo Moncada MD   Fiber Adult Gummies 2 g CHEW Take 2 tablets by mouth daily 2/3/25 2/3/26 Yes Mundo Moncada MD   fluticasone-umeclidin-vilant (TRELEGY ELLIPTA) 200-62.5-25 MCG/ACT AEPB inhaler Inhale 1 puff into the lungs daily 24  Yes Karoline Prater APRN - CNP   montelukast (SINGULAIR) 10 MG tablet Take 1 tablet by mouth nightly 24  Yes Karoline Prater APRN - CNP   cyclobenzaprine (FLEXERIL) 10 MG tablet Take 1 tablet by mouth 3 times daily as needed for Muscle spasms   Yes ProviderBriana MD   pantoprazole (PROTONIX) 40 MG tablet Take 1 tablet by mouth 2 times daily (before meals) 23  Yes ProviderBriana MD   albuterol sulfate HFA (VENTOLIN HFA) 108 (90 Base) MCG/ACT inhaler Inhale 2 puffs into the lungs every 6 hours as needed for Wheezing   Yes ProviderBriana MD   betamethasone valerate (VALISONE) 0.1 % cream Apply topically 2 times daily. 2/3/25 2/10/25  Mundo Moncada MD   sennosides-docusate sodium (SENOKOT-S) 8.6-50 MG tablet Take 1 tablet by mouth daily  Patient not taking:

## 2025-02-06 NOTE — PROGRESS NOTES
1255 Pt arrives to pacu on room air. Pt A&Ox4. Respirations easy and unlabored. VSS.   1305 Pt complaining of 7/10 pain. 50mcg Fentanyl given. Ice applied to surgical incision.   1310 Pain unchanged. 50mcg Fentanyl given.   1320 Pt pain is tolerable. 5-6/10. Ice is alleviating discomfort rectal and throat discomfort.   1330 Pt meets criteria for discharge from pacu.   1335 Pt transferred to [facility] in stable condition. Report given to [facility] RN. All questions and concerns addressed at this time.

## 2025-02-06 NOTE — PROGRESS NOTES
Pt returned to [facility] room 7. Vitals and assessment as charted. 0.9 infusing, @400ml to count from PACU. Pt has sherbet and water. Family at the bedside. Pt and family verbalized understanding of discharge criteria and call light use. Call light in reach.

## 2025-02-06 NOTE — DISCHARGE INSTRUCTIONS
DR. PETERSEN'S DISCHARGE INSTRUCTIONS    Pt Name: Chelsey Juarez  Medical Record Number: [id number]  Today's Date: 2/6/2025    GENERAL ANESTHESIA OR SEDATION  1. Do not drive or operate hazardous machinery for 24 hours.  2. Do not make important business or personal decisions for 24 hours.  3. Do not drink alcoholic beverages or use tobacco for 24 hours.    ACTIVITY INSTRUCTIONS:  [] Rest today. Resume light to normal activity tomorrow.   [] You may resume normal activity tomorrow. Do not engage in strenuous activity that may place stress on your incision.  [x] Do not drive while taking the pain medication. Avoid heavy lifting, tugging, pullings greater than 10 lbs until seen in the office.      DIET INSTRUCTIONS:  [x]Begin with clear liquids. If not nauseated, may increase to a low-fat diet when you desire. Greasy and spicy foods are not advised.  [x]Regular diet as tolerated.  []Other:     MEDICATIONS  [x]Prescription sent with you to be used as directed.   [x]Norco and Toradol (non-narcotic)  Do not drink alcohol or drive while taking these medications. You may experience dizziness or drowsiness with these medications. You may also experience constipation which can be relieved with stool softners or laxatives.  **Pain medication at discharge - use only as prescribed- refills may be available to you at your follow up appointments if needed and warranted.  Narcotics should be used for only short term and we highly encouraged our patients to wean off appropriately and use other means for pain such as non pharmacologic measures and over the counter tylenol or ibuprofen if no restrictions apply. We do  know that surgical pain is real and will not hesitate to help eliminate some of your discomfort. However we will not be able to completely make you pain free and it is important to determine what pain level is tolerable for you **  [x]You may resume your daily prescription medication schedule unless otherwise

## 2025-02-06 NOTE — PROGRESS NOTES
Pt has met discharge criteria and states she is ready for discharge to home. IV removed, gauze and tape applied. Dressed in own clothes and personal belongings gathered. Discharge instructions (with opioid medication education information) given to pt and family. Pt and family verbalized understanding of discharge instructions, prescriptions and follow up appointments. Pt transported to discharge lobby by [facility] staff.

## 2025-02-06 NOTE — BRIEF OP NOTE
Brief Postoperative Note      Patient: Chelsey Juarez  YOB: 1980  MRN: [id number]    Date of Procedure: 2/6/2025    Pre-Op Diagnosis Codes:      * Thrombosed hemorrhoids [K64.5]    Post-Op Diagnosis: Same       Procedure(s):  Excision thrombosed hemorrhoid    Surgeon(s):  Raffi Vines MD    Assistant:  First Assistant: Milton Duenas RN    Anesthesia: General/local    Estimated Blood Loss (mL): 5ml    Complications: None    Specimens:   ID Type Source Tests Collected by Time Destination   A : hemorrhoid Tissue Anus SURGICAL PATHOLOGY Raffi Vines MD 2/6/2025 1243        Implants:  * No implants in log *      Drains: * No LDAs found *    Findings:  Infection Present At Time Of Surgery (PATOS) (choose all levels that have infection present):  No infection present    Other Findings: see op note    Electronically signed by Raffi Vines MD on 2/6/2025 at 12:49 PM

## 2025-02-06 NOTE — OP NOTE
[address] [address]                            OPERATIVE REPORT      PATIENT NAME: TEMO CARDENAS               : 1980  MED REC NO: [id number]                       ROOM: [facility]                                               (General) POOL                                               RM    ACCOUNT NO: [id number]                       ADMIT DATE: 2025  PROVIDER: Raffi Vines MD      DATE OF PROCEDURE:  2025    SURGEON:  Raffi Vines MD    PREOPERATIVE DIAGNOSIS:  Acute thrombosed left-sided external hemorrhoid.    POSTOPERATIVE DIAGNOSIS:  Acute thrombosed left-sided external hemorrhoid.    PROCEDURES:    1. Anal exam under anesthesia.  2. Left-sided external hemorrhoidectomy.    ASSISTANTS:  VINITA Mendoza and Chris Lambert CNP    ANESTHESIA:  General/local.    ESTIMATED BLOOD LOSS:  5 mL.    DRAINS:  None.    COMPLICATIONS:  None.    DISPOSITION:  Stable to the recovery room.    INDICATIONS:  The patient is a 44-year-old female who I saw in the office yesterday secondary to a thrombosed external hemorrhoid on the left side.  She had issues with this back in , but has done well until recently.  Had a bout according to the patient.  Thrombosed external hemorrhoid on the left side.  Underwent lancing in the emergency department several days ago.  At first, she though this was better, but then it worsened again.  She is in need of excision due to persistent pain and discomfort.  Some necrosis now on the hemorrhoid.  Both operative and nonoperative intervention plans were discussed.  Risks of surgery were further discussed.  Some of the risks included, but were not limited to, bleeding, infection, the need for reoperation, severe chronic postoperative pain or numbness, major vascular or nerve injury, cardiopulmonary complications, anesthetic complications, seroma or hematoma formation, wound breakdown,

## 2025-02-06 NOTE — ANESTHESIA POSTPROCEDURE EVALUATION
Department of Anesthesiology  Postprocedure Note    Patient: Chelsey Juarez  MRN: [id number]  YOB: 1980  Date of evaluation: 2/6/2025    Procedure Summary       Date: 02/06/25 Room / Location: [facility] OR  / [facility] OR    Anesthesia Start: 1215 Anesthesia Stop: 1256    Procedure: Excision thrombosed hemorrhoid (Anus) Diagnosis:       Thrombosed hemorrhoids      (Thrombosed hemorrhoids [K64.5])    Surgeons: Raffi Vines MD Responsible Provider: Andrea Siddiqui DO    Anesthesia Type: General ASA Status: 3            Anesthesia Type: General    Kyara Phase I: Kyara Score: 10    Kyara Phase II: Kyara Score: 10    Anesthesia Post Evaluation    Patient location during evaluation: PACU  Patient participation: complete - patient participated  Level of consciousness: awake and alert  Airway patency: patent  Nausea & Vomiting: no nausea  Cardiovascular status: blood pressure returned to baseline and hemodynamically stable  Respiratory status: acceptable and spontaneous ventilation  Hydration status: euvolemic  Pain management: adequate    No notable events documented.

## 2025-02-06 NOTE — PROGRESS NOTES
Pt admitted to [facility] room 7 and oriented to unit. SCD sleeves applied. Nares swabbed. Pt verbalized permission for first name, last initial and physicians name on white board. SDS board and discharge criteria explained, pt verbalized understanding. Pt denies thoughts of harming self or others. Call light in reach. No Family at the bedside.  Lizeth [phone]

## 2025-02-07 ENCOUNTER — TELEPHONE (OUTPATIENT)
Dept: SURGERY | Age: 45
End: 2025-02-07

## 2025-02-07 NOTE — TELEPHONE ENCOUNTER
Post procedural phone call placed to patient. Patient utilizing OTC  medication with adequate control of pain. Patient education against constipation, recommended increased fluid intake, progressive ambulation and stool softeners/stimulants as directed. Patient able to readback education. Patients follow up appointment verified and confirmed in chart. Time spent for patient questions. Patient to follow up with office as needed.

## 2025-02-11 RX ORDER — POLYETHYLENE GLYCOL 3350 17 G/17G
17 POWDER, FOR SOLUTION ORAL DAILY PRN
Qty: 510 G | Refills: 0 | Status: SHIPPED | OUTPATIENT
Start: 2025-02-11 | End: 2025-03-13

## 2025-02-11 NOTE — TELEPHONE ENCOUNTER
Patient called to office requesting refills for medications dibucaine 1 % perianal ointment and polyethylene glycol (GLYCOLAX) 17 GM/SCOOP powder. Patient unable to  medications over the counter due to lack of income. If approved please send medications to [id number] - [place], [place] - [address] - P [phone] - F [phone] [[id number]]

## 2025-02-17 PROBLEM — K76.0 FATTY LIVER: Status: ACTIVE | Noted: 2025-02-17

## 2025-02-17 PROBLEM — K74.01 HEPATIC FIBROSIS, EARLY FIBROSIS: Status: ACTIVE | Noted: 2025-02-17

## 2025-02-19 ENCOUNTER — HOSPITAL ENCOUNTER (EMERGENCY)
Age: 45
Discharge: HOME | End: 2025-02-19
Payer: COMMERCIAL

## 2025-02-19 VITALS
RESPIRATION RATE: 16 BRPM | DIASTOLIC BLOOD PRESSURE: 92 MMHG | SYSTOLIC BLOOD PRESSURE: 143 MMHG | TEMPERATURE: 98.2 F | HEART RATE: 83 BPM | OXYGEN SATURATION: 97 %

## 2025-02-19 DIAGNOSIS — J40 BRONCHITIS: Primary | ICD-10-CM

## 2025-02-19 LAB
FLUAV AG SPEC QL: NEGATIVE
FLUBV AG SPEC QL: NEGATIVE
SARS-COV-2 RDRP RESP QL NAA+PROBE: NOT  DETECTED

## 2025-02-19 PROCEDURE — 87804 INFLUENZA ASSAY W/OPTIC: CPT

## 2025-02-19 PROCEDURE — 87635 SARS-COV-2 COVID-19 AMP PRB: CPT

## 2025-02-19 PROCEDURE — 99213 OFFICE O/P EST LOW 20 MIN: CPT | Performed by: PEDIATRICS

## 2025-02-19 PROCEDURE — 99213 OFFICE O/P EST LOW 20 MIN: CPT

## 2025-02-19 RX ORDER — DOXYCYCLINE HYCLATE 100 MG
100 TABLET ORAL 2 TIMES DAILY
Qty: 20 TABLET | Refills: 0 | Status: SHIPPED | OUTPATIENT
Start: 2025-02-19 | End: 2025-02-19

## 2025-02-19 RX ORDER — AZITHROMYCIN 250 MG/1
TABLET, FILM COATED ORAL
Qty: 1 PACKET | Refills: 0 | Status: SHIPPED | OUTPATIENT
Start: 2025-02-19 | End: 2025-02-19 | Stop reason: ALTCHOICE

## 2025-02-19 RX ORDER — AZITHROMYCIN 250 MG/1
TABLET, FILM COATED ORAL
Qty: 1 PACKET | Refills: 0 | Status: SHIPPED | OUTPATIENT
Start: 2025-02-19 | End: 2025-02-23

## 2025-02-19 ASSESSMENT — ENCOUNTER SYMPTOMS
SINUS PAIN: 1
EYES NEGATIVE: 1
COUGH: 1
SORE THROAT: 1
GASTROINTESTINAL NEGATIVE: 1
ALLERGIC/IMMUNOLOGIC NEGATIVE: 1

## 2025-02-19 ASSESSMENT — PAIN - FUNCTIONAL ASSESSMENT: PAIN_FUNCTIONAL_ASSESSMENT: NONE - DENIES PAIN

## 2025-02-19 NOTE — ED PROVIDER NOTES
[facility] URGENT CARE  Urgent Care Encounter       CHIEF COMPLAINT       Chief Complaint   Patient presents with    Cough    Nasal Congestion       Nurses Notes reviewed and I agree except as noted in the HPI.  HISTORY OF PRESENT ILLNESS   Chelsey Juarez is a 44 y.o. female who presents with 3-day history of nasal congestion and cough.  Patient reports she has been doing albuterol aerosol treatments at home which have been helping.  Patient reports \"I have a history of atelectasis and I did not want to go that far so, so I decided to come in.  \"Patient reports intermittent low-grade fevers.  Patient reports multiple ill contacts at home.    The history is provided by the patient. No  was used.       REVIEW OF SYSTEMS     Review of Systems   Constitutional:  Positive for fever (intermittent low grade fevers).   HENT:  Positive for congestion, postnasal drip, sinus pain and sore throat.    Eyes: Negative.    Respiratory:  Positive for cough.    Cardiovascular: Negative.    Gastrointestinal: Negative.    Endocrine: Negative.    Genitourinary: Negative.    Musculoskeletal: Negative.    Skin: Negative.    Allergic/Immunologic: Negative.    Neurological: Negative.    Hematological: Negative.    Psychiatric/Behavioral: Negative.         PAST MEDICAL HISTORY         Diagnosis Date    Airway obstruction     Back pain     chronic    Cystitis, interstitial     Esophageal ulcer     GERD (gastroesophageal reflux disease)     Headache(784.0)     Hyperlipidemia     Insulin resistance     Irritable bowel syndrome     Spondylolisthesis     Spondylolysis        SURGICALHISTORY     Patient  has a past surgical history that includes cyst removal (01/01/2011); Hysterectomy (2010); Abdominal adhesion surgery (01/01/2012); Colonoscopy (2005, 2011); Tonsillectomy and adenoidectomy (01/01/1987); [place] tooth extraction (01/01/1996); Ovary removal (2010); Shoulder arthroscopy (Left, 04/17/2023); Ankle surgery

## 2025-02-19 NOTE — ED NOTES
Reports to [facility] with concern of illness. She reports she had a procedure on 02/06 to extract her hemorrhoids. She reports cough and nasal and chest congestion.  she reports symptoms started Monday. Reports she is concerned for pneumonia due to not being able to cough fully with recent surgery.      Odilon King, RN  02/19/25 [id number]

## 2025-02-24 ENCOUNTER — OFFICE VISIT (OUTPATIENT)
Dept: SURGERY | Age: 45
End: 2025-02-24

## 2025-02-24 VITALS
OXYGEN SATURATION: 98 % | RESPIRATION RATE: 18 BRPM | TEMPERATURE: 96.9 F | SYSTOLIC BLOOD PRESSURE: 128 MMHG | DIASTOLIC BLOOD PRESSURE: 86 MMHG | HEART RATE: 73 BPM

## 2025-02-24 DIAGNOSIS — Z98.890 S/P HEMORRHOIDECTOMY: Primary | ICD-10-CM

## 2025-02-24 DIAGNOSIS — Z87.19 S/P HEMORRHOIDECTOMY: Primary | ICD-10-CM

## 2025-02-24 PROCEDURE — 99024 POSTOP FOLLOW-UP VISIT: CPT | Performed by: NURSE PRACTITIONER

## 2025-02-24 RX ORDER — SENNA AND DOCUSATE SODIUM 50; 8.6 MG/1; MG/1
1 TABLET, FILM COATED ORAL DAILY
Qty: 30 TABLET | Refills: 3 | Status: SHIPPED | OUTPATIENT
Start: 2025-02-24 | End: 2025-06-24

## 2025-02-24 RX ORDER — POLYETHYLENE GLYCOL 3350 17 G/17G
17 POWDER, FOR SOLUTION ORAL DAILY PRN
Qty: 510 G | Refills: 3 | Status: SHIPPED | OUTPATIENT
Start: 2025-02-24 | End: 2025-06-24

## 2025-02-24 RX ORDER — DIBUCAINE 1% 1 G/100G
CREAM TOPICAL
Qty: 1 EACH | Refills: 2 | Status: SHIPPED | OUTPATIENT
Start: 2025-02-24

## 2025-02-24 RX ORDER — KETOROLAC TROMETHAMINE 10 MG/1
10 TABLET, FILM COATED ORAL EVERY 6 HOURS PRN
Qty: 20 TABLET | Refills: 0 | Status: SHIPPED | OUTPATIENT
Start: 2025-03-10

## 2025-02-24 NOTE — PROGRESS NOTES
[facility] [place] [facility]  [facility]  [address]  [facility] [address]  Dept: [phone]  Dept Fax: [phone]  Loc: [phone]    Visit Date: 2/24/2025    Chelsey Juarez is a 44 y.o. female who presents today for:  Chief Complaint   Patient presents with    Post-Op Check     S/p Anal exam under anesthesia, left-sided external hemorrhoidectomy 2/06/25       HPI:     HPI    Chelsey is a 44-year old female patient who presents today for follow-up status post AEUA with left sided hemorrhoidectomy over 2 weeks ago with Dr. Vines. States she is doing fairly well. Pain better this week than last. She had some bleeding after hard stool last week but no bleeding this week. Slight mucus drainage, wearing keith-pad. Rectal area seems to be healing well. She is taking stool softeners and laxatives as needed daily still. Off norco but still taking toradol for pain.  Tolerating regular diet without any nausea or vomiting.  No melena or hematochezia. No signs of infection.  No fevers or chills.  Urinating without difficulties.  Denies any shortness of breath or chest pain.  Tolerating increased activity well. Still hard to sit for long periods.     Past Medical History:   Diagnosis Date    Airway obstruction     Back pain     chronic    Cystitis, interstitial     Esophageal ulcer     GERD (gastroesophageal reflux disease)     Headache(784.0)     Hyperlipidemia     Insulin resistance     Irritable bowel syndrome     Spondylolisthesis     Spondylolysis       Past Surgical History:   Procedure Laterality Date    ABDOMINAL ADHESION SURGERY  01/01/2012    Dr. Guardado    ANKLE SURGERY Right 2011    COLONOSCOPY  2005, 2011    Dr. Brooks    CYST REMOVAL  01/01/2011    right ankle-Dr. Hernandez    HEMORRHOID SURGERY N/A 2/6/2025    Excision thrombosed hemorrhoid performed by Raffi Vines MD at [facility] OR    HYSTERECTOMY (CERVIX STATUS UNKNOWN)  2010    Dr. Guardado    OVARY

## 2025-02-26 ASSESSMENT — ENCOUNTER SYMPTOMS
APNEA: 0
SHORTNESS OF BREATH: 0
BACK PAIN: 1
RHINORRHEA: 0
ABDOMINAL DISTENTION: 0
DIARRHEA: 0
COUGH: 0
ABDOMINAL PAIN: 0
NAUSEA: 0
BLOOD IN STOOL: 0
COLOR CHANGE: 0
RECTAL PAIN: 1
VOMITING: 0
WHEEZING: 0
EYE DISCHARGE: 0
ALLERGIC/IMMUNOLOGIC NEGATIVE: 1
ANAL BLEEDING: 0
PHOTOPHOBIA: 0
SORE THROAT: 0
CHEST TIGHTNESS: 0
SINUS PRESSURE: 0
EYE PAIN: 0
CHOKING: 0
CONSTIPATION: 1
EYE ITCHING: 0

## 2025-03-06 ENCOUNTER — OFFICE VISIT (OUTPATIENT)
Dept: UROLOGY | Age: 45
End: 2025-03-06
Payer: COMMERCIAL

## 2025-03-06 VITALS — BODY MASS INDEX: 40.4 KG/M2 | WEIGHT: 214 LBS | RESPIRATION RATE: 18 BRPM | HEIGHT: 61 IN

## 2025-03-06 DIAGNOSIS — R30.0 DYSURIA: Primary | ICD-10-CM

## 2025-03-06 PROCEDURE — G8417 CALC BMI ABV UP PARAM F/U: HCPCS

## 2025-03-06 PROCEDURE — 4004F PT TOBACCO SCREEN RCVD TLK: CPT

## 2025-03-06 PROCEDURE — 81003 URINALYSIS AUTO W/O SCOPE: CPT

## 2025-03-06 PROCEDURE — 99214 OFFICE O/P EST MOD 30 MIN: CPT

## 2025-03-06 PROCEDURE — G8427 DOCREV CUR MEDS BY ELIG CLIN: HCPCS

## 2025-03-06 ASSESSMENT — ENCOUNTER SYMPTOMS: CONSTIPATION: 1

## 2025-03-06 NOTE — PATIENT INSTRUCTIONS
Hydrodistension is a medical procedure that involves filling the bladder with sterile fluid to stretch it and assess its capacity. It is commonly used to diagnose and treat interstitial cystitis (IC), a chronic bladder condition characterized by pain, frequent urination, and urgenc   The medication list included in this document is our record of what you are currently taking, including any changes that were made at today's visit.  If you find any differences when compared to your medications at home, or have any questions that were not answered at your visit, please contact the office.  Call with questions, comments, or concerns. I recommend going to the ED for further evaluation if you develop fever, chills, nausea, vomiting, chest pain, SOB, or calf pain.

## 2025-03-06 NOTE — PROGRESS NOTES
Previous patient of Dr. Funez. Notes undergoing bladder installation/ rinse several times- last procedure 2010. Has historically taken Elmiron for management of symptomatology. 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 Failed Amitriptyline, as well. Negative CT in 9/2024. Unremarkable cystoscopy in 5/2024.       Take Ditropan and Pyridium as needed for bladder pain. At this time, she would like to proceed with surgical management. Will schedule for cystoscopy, hydrodistension with Dr. Steve Smith. Discussed r/b/a.      Pelvic Pain: Likely secondary to the above      Back and neck pain: patient with plans to undergo surgery           Schedule for cystoscopy, hydrodistension         Celina Dickey PA-C  Urology

## 2025-03-07 ENCOUNTER — TELEPHONE (OUTPATIENT)
Dept: UROLOGY | Age: 45
End: 2025-03-07

## 2025-03-07 DIAGNOSIS — N30.10 INTERSTITIAL CYSTITIS: Primary | ICD-10-CM

## 2025-03-07 DIAGNOSIS — Z01.818 PRE-OP TESTING: ICD-10-CM

## 2025-03-07 DIAGNOSIS — R30.0 DYSURIA: ICD-10-CM

## 2025-03-07 DIAGNOSIS — N30.10 INTERSTITIAL CYSTITIS: ICD-10-CM

## 2025-03-07 NOTE — TELEPHONE ENCOUNTER
Patient is scheduled for surgery with  on 5/5/25. Surgery consent to be done on arrival. Dr. Uriarte to clear. Patient to do pre op urine culture and fasting labs on 4/24/25. Surgery instructions  mailed to the patient.     Patient informed an adult over the age of 18 must be with them at the time of surgery and upon discharge

## 2025-03-07 NOTE — TELEPHONE ENCOUNTER
Patient scheduled for a Cystoscopy with [place] distension with Dr Smith on 5/5/25. We are asking for clearance

## 2025-03-10 ENCOUNTER — TELEPHONE (OUTPATIENT)
Dept: UROLOGY | Age: 45
End: 2025-03-10

## 2025-03-10 NOTE — TELEPHONE ENCOUNTER
We only do cardio if they are following. She does not need medical clearance. She must of been unaware that she is PRN. Thanks

## 2025-03-12 ENCOUNTER — OFFICE VISIT (OUTPATIENT)
Dept: SURGERY | Age: 45
End: 2025-03-12

## 2025-03-12 VITALS
TEMPERATURE: 97.2 F | SYSTOLIC BLOOD PRESSURE: 116 MMHG | WEIGHT: 214 LBS | HEIGHT: 61 IN | OXYGEN SATURATION: 95 % | DIASTOLIC BLOOD PRESSURE: 78 MMHG | HEART RATE: 96 BPM | BODY MASS INDEX: 40.4 KG/M2

## 2025-03-12 DIAGNOSIS — Z87.19 S/P HEMORRHOIDECTOMY: Primary | ICD-10-CM

## 2025-03-12 DIAGNOSIS — Z98.890 S/P HEMORRHOIDECTOMY: Primary | ICD-10-CM

## 2025-03-12 PROCEDURE — 99024 POSTOP FOLLOW-UP VISIT: CPT | Performed by: NURSE PRACTITIONER

## 2025-03-12 ASSESSMENT — ENCOUNTER SYMPTOMS
ABDOMINAL DISTENTION: 0
EYE PAIN: 0
WHEEZING: 0
CONSTIPATION: 1
COLOR CHANGE: 0
ABDOMINAL PAIN: 0
DIARRHEA: 1
PHOTOPHOBIA: 0
ANAL BLEEDING: 0
SHORTNESS OF BREATH: 0
RECTAL PAIN: 1
APNEA: 0
BACK PAIN: 1
ALLERGIC/IMMUNOLOGIC NEGATIVE: 1
VOMITING: 0
NAUSEA: 0
CHOKING: 0
CHEST TIGHTNESS: 0
EYE DISCHARGE: 0
COUGH: 0
BLOOD IN STOOL: 0
EYE ITCHING: 0

## 2025-03-12 NOTE — PROGRESS NOTES
[facility] [place] [facility]  [facility]  [address]  [facility] [address]  Dept: [phone]  Dept Fax: [phone]  Loc: [phone]    Visit Date: 3/12/2025    Chelsey Juarez is a 44 y.o. female who presents today for:  Chief Complaint   Patient presents with    Post-Op Check     S/p Anal exam under anesthesia, left-sided external hemorrhoidectomy 2/06/25.  Last office visit 2/24/25     HPI:     HPI    Chelsey is a 44-year old female patient who presents today for follow-up status post AEUA with left sided hemorrhoidectomy over 5 weeks ago with Dr. Vines. States she is doing fairly well. Pain better than last visit. Complains of more pain to right side of buttocks where she has small hemorrhoid. Denies any rectal drainage. Very occasional bleeding. Tried to add more cheese into her diet which caused constipation. Other than that she has been doing well with bowel function. Rectal area healing well. She is taking stool softeners and laxatives as needed daily still. Off norco for pain control. Tolerating regular diet without any nausea or vomiting.  No melena or hematochezia. No signs of infection.  No fevers or chills.  Urinating without difficulties.  Denies any shortness of breath or chest pain. A lot of chronic issues. Has foot surgery in may. Saw GI and had recent scan which demonstrated liver fibrosis. Complaints of fatigue, weight gain, high stress levels.        Past Medical History:   Diagnosis Date    Airway obstruction     Back pain     chronic    Cystitis, interstitial     Esophageal ulcer     GERD (gastroesophageal reflux disease)     Headache(784.0)     Hyperlipidemia     Insulin resistance     Irritable bowel syndrome     Spondylolisthesis     Spondylolysis       Past Surgical History:   Procedure Laterality Date    ABDOMINAL ADHESION SURGERY  01/01/2012    Dr. Guardado    ANKLE SURGERY Right 2011    COLONOSCOPY  2005, 2011    Dr. Sheikh BARRAGAN

## 2025-03-18 ENCOUNTER — TELEPHONE (OUTPATIENT)
Dept: SURGERY | Age: 45
End: 2025-03-18

## 2025-03-18 RX ORDER — DIBUCAINE 1% 1 G/100G
CREAM TOPICAL
Qty: 1 EACH | Refills: 2 | Status: SHIPPED | OUTPATIENT
Start: 2025-03-18

## 2025-04-09 ENCOUNTER — TELEPHONE (OUTPATIENT)
Age: 45
End: 2025-04-09

## 2025-04-09 NOTE — TELEPHONE ENCOUNTER
TRELEGY prior authorization was approved.   -  Approval Expires: 04/08/2026  -  The PA# assigned is [id number].

## 2025-04-09 NOTE — TELEPHONE ENCOUNTER
Submitted prior authorization for TRELEGY today.  -  I will update this encounter once a determination has been received.  -  CoverMyMeds Key: [id number]

## 2025-04-11 ENCOUNTER — PREP FOR PROCEDURE (OUTPATIENT)
Dept: UROLOGY | Age: 45
End: 2025-04-11

## 2025-04-11 RX ORDER — SODIUM CHLORIDE 9 MG/ML
INJECTION, SOLUTION INTRAVENOUS PRN
Status: CANCELLED | OUTPATIENT
Start: 2025-04-11

## 2025-04-11 RX ORDER — IPRATROPIUM BROMIDE AND ALBUTEROL SULFATE 2.5; .5 MG/3ML; MG/3ML
1 SOLUTION RESPIRATORY (INHALATION) ONCE
Status: CANCELLED | OUTPATIENT
Start: 2025-04-11 | End: 2025-04-11

## 2025-04-11 RX ORDER — SODIUM CHLORIDE 0.9 % (FLUSH) 0.9 %
5-40 SYRINGE (ML) INJECTION PRN
Status: CANCELLED | OUTPATIENT
Start: 2025-04-11

## 2025-04-11 RX ORDER — SODIUM CHLORIDE 0.9 % (FLUSH) 0.9 %
5-40 SYRINGE (ML) INJECTION EVERY 12 HOURS SCHEDULED
Status: CANCELLED | OUTPATIENT
Start: 2025-04-11

## 2025-04-21 ENCOUNTER — HOSPITAL ENCOUNTER (OUTPATIENT)
Age: 45
Discharge: HOME | End: 2025-04-21
Payer: COMMERCIAL

## 2025-04-21 DIAGNOSIS — Z01.818 PRE-OP TESTING: ICD-10-CM

## 2025-04-21 DIAGNOSIS — N30.10 INTERSTITIAL CYSTITIS: ICD-10-CM

## 2025-04-21 DIAGNOSIS — R30.0 DYSURIA: ICD-10-CM

## 2025-04-21 LAB
ANION GAP SERPL CALC-SCNC: 13 MEQ/L (ref 8–16)
BASOPHILS ABSOLUTE: 0 THOU/MM3 (ref 0–0.1)
BASOPHILS NFR BLD AUTO: 0.1 %
BUN SERPL-MCNC: 15 MG/DL (ref 8–23)
CALCIUM SERPL-MCNC: 9.6 MG/DL (ref 8.6–10)
CHLORIDE SERPL-SCNC: 104 MEQ/L (ref 98–111)
CO2 SERPL-SCNC: 23 MEQ/L (ref 22–29)
CREAT SERPL-MCNC: 0.7 MG/DL (ref 0.5–0.9)
DEPRECATED RDW RBC AUTO: 42 FL (ref 35–45)
EOSINOPHIL NFR BLD AUTO: 2 %
EOSINOPHILS ABSOLUTE: 0.1 THOU/MM3 (ref 0–0.4)
ERYTHROCYTE [DISTWIDTH] IN BLOOD BY AUTOMATED COUNT: 12.9 % (ref 11.5–14.5)
GFR SERPL CREATININE-BSD FRML MDRD: > 90 ML/MIN/1.73M2
GLUCOSE SERPL-MCNC: 124 MG/DL (ref 74–109)
HCT VFR BLD AUTO: 38.3 % (ref 37–47)
HGB BLD-MCNC: 13.1 GM/DL (ref 12–16)
IMM GRANULOCYTES # BLD AUTO: 0.02 THOU/MM3 (ref 0–0.07)
IMM GRANULOCYTES NFR BLD AUTO: 0.3 %
LYMPHOCYTES ABSOLUTE: 3.2 THOU/MM3 (ref 1–4.8)
LYMPHOCYTES NFR BLD AUTO: 43.6 %
MCH RBC QN AUTO: 30.3 PG (ref 26–33)
MCHC RBC AUTO-ENTMCNC: 34.2 GM/DL (ref 32.2–35.5)
MCV RBC AUTO: 88.5 FL (ref 81–99)
MONOCYTES ABSOLUTE: 0.5 THOU/MM3 (ref 0.4–1.3)
MONOCYTES NFR BLD AUTO: 6.2 %
NEUTROPHILS ABSOLUTE: 3.5 THOU/MM3 (ref 1.8–7.7)
NEUTROPHILS NFR BLD AUTO: 47.8 %
NRBC BLD AUTO-RTO: 0 /100 WBC
PLATELET # BLD AUTO: 240 THOU/MM3 (ref 130–400)
PMV BLD AUTO: 10.9 FL (ref 9.4–12.4)
POTASSIUM SERPL-SCNC: 4.4 MEQ/L (ref 3.5–5.2)
RBC # BLD AUTO: 4.33 MILL/MM3 (ref 4.2–5.4)
SODIUM SERPL-SCNC: 140 MEQ/L (ref 135–145)
WBC # BLD AUTO: 7.4 THOU/MM3 (ref 4.8–10.8)

## 2025-04-21 PROCEDURE — 87086 URINE CULTURE/COLONY COUNT: CPT

## 2025-04-21 PROCEDURE — 36415 COLL VENOUS BLD VENIPUNCTURE: CPT

## 2025-04-21 PROCEDURE — 85025 COMPLETE CBC W/AUTO DIFF WBC: CPT

## 2025-04-21 PROCEDURE — 80048 BASIC METABOLIC PNL TOTAL CA: CPT

## 2025-04-22 ENCOUNTER — TELEPHONE (OUTPATIENT)
Dept: UROLOGY | Age: 45
End: 2025-04-22

## 2025-04-22 ENCOUNTER — RESULTS FOLLOW-UP (OUTPATIENT)
Dept: UROLOGY | Age: 45
End: 2025-04-22

## 2025-04-22 LAB
BACTERIA UR CULT: ABNORMAL
ORGANISM: ABNORMAL

## 2025-04-22 NOTE — TELEPHONE ENCOUNTER
Take Ditropan and Pyridium/AZO as needed for bladder pain.     Push fluids as able.     Avoid constipation and bladder irritants.     Foods and drinks that can be irritating to the bladder and contribute to pain:         The patient should go to the ED should they develop fever, chills, nausea, vomiting, significant gross hematuria, chest pain, SOB, calf pain, feelings of incomplete emptying, or should they otherwise feel they need evaluated.    Please have the patient follow-up as scheduled or sooner if needed

## 2025-04-22 NOTE — TELEPHONE ENCOUNTER
Patient advised of the recommendations. She voiced understanding and message was sent via my chart

## 2025-04-22 NOTE — TELEPHONE ENCOUNTER
Patient c/o a lot of bladder pain, increase urgency and frequency.     Advised her culture showed a growth of contaminants.    She has not ate any types of food that would trigger her IC.    She is not taking the ditropan or pyridium.    Patient scheduled CYSTOSCOPY HYDRODISTENTION on 05/05/2025

## 2025-04-29 RX ORDER — TOPIRAMATE 100 MG/1
100 TABLET, FILM COATED ORAL DAILY
COMMUNITY
Start: 2025-03-17

## 2025-04-29 RX ORDER — ONDANSETRON 4 MG/1
4 TABLET, FILM COATED ORAL EVERY 8 HOURS PRN
COMMUNITY
Start: 2025-03-26

## 2025-04-29 RX ORDER — HYDROCODONE BITARTRATE AND ACETAMINOPHEN 5; 325 MG/1; MG/1
1 TABLET ORAL EVERY 4 HOURS PRN
COMMUNITY
Start: 2025-04-09

## 2025-04-29 NOTE — PROGRESS NOTES
Follow all instructions given by your physician  If Urology case Call [phone] the weekday before procedure to find out Arrival Time.  Do not eat or drink anything after midnight prior to surgery(includes water, chewing gum, mints and ice chips)  Sips of water am of surgery with allowed medications  May brush teeth   Do not smoke or chew tobacco, drink alcoholic beverages or use any illicit drugs for 24 hours prior to surgery  Bring insurance info and photo ID  Bring pertinent paperwork with you from Doctor or surgeons's office  Wear clean comfortable, loose-fitting clothing  No make-up, nail polish, jewelry, piercings, or contact lenses to be worn day of surgery  No glue on dentures morning of surgery; you will be asked to remove them for surgery. Case for glasses.  Shower the night before and the morning of surgery with cleansing soap provided or a liquid antibacterial soap, dry with new fresh clean towel after each shower, no lotions, creams or powder.  Clean sheets and pillowcase on bed night before surgery  Bring rescue inhalers with you  Bring CPAP/BIPAP machine if you have one ( you may be charged if one is needed in recovery room )    Do you have a DNR?   Please Bring Healthcare Directive or Healthcare Power of  in so we can scan it into your chart.    Our pharmacy has a Meds to Beds program where they will deliver any new prescriptions you may have to your room before you leave. Our Pharmacy will clear it through your insurance; for example (same co pay). This enables you to take your new RX as soon as you need when you get home and avoids stop/wait delays on the way home.  Please have a form of payment with you and have someone designated as your Pharmacy contact with their phone # as you may not feel well or still be under the influence of anesthesia.    Please refer to the SSI-Surgical Site Infection Flyer you hopefully received in the mail-together we can prevent infections; signs and

## 2025-04-30 DIAGNOSIS — J45.40 MODERATE PERSISTENT ASTHMA WITHOUT COMPLICATION: ICD-10-CM

## 2025-04-30 RX ORDER — MONTELUKAST SODIUM 10 MG/1
10 TABLET ORAL NIGHTLY
Qty: 90 TABLET | Refills: 3 | Status: SHIPPED | OUTPATIENT
Start: 2025-04-30

## 2025-04-30 NOTE — TELEPHONE ENCOUNTER
Received refill request for montelukast.   Medication was last ordered by Karoline.   Medication was last ordered on 4/22/24 with 3 refills.     Patient was last seen in the office 10/18/24.   Does patient have a scheduled follow up?: yes - 7/1/25    Medication needs to be sent to [facility].     Thank you, please advise!    Patient's Allergies:  Allergies   Allergen Reactions    Latex Rash and Other (See Comments)    Other Hives     Patient stated surgical glue -hives

## 2025-05-05 ENCOUNTER — HOSPITAL ENCOUNTER (OUTPATIENT)
Age: 45
Setting detail: OUTPATIENT SURGERY
Discharge: HOME | End: 2025-05-05
Attending: UROLOGY | Admitting: UROLOGY
Payer: COMMERCIAL

## 2025-05-05 ENCOUNTER — ANESTHESIA EVENT (OUTPATIENT)
Dept: OPERATING ROOM | Age: 45
End: 2025-05-05
Payer: COMMERCIAL

## 2025-05-05 ENCOUNTER — ANESTHESIA (OUTPATIENT)
Dept: OPERATING ROOM | Age: 45
End: 2025-05-05
Payer: COMMERCIAL

## 2025-05-05 VITALS
WEIGHT: 213 LBS | BODY MASS INDEX: 40.22 KG/M2 | RESPIRATION RATE: 18 BRPM | DIASTOLIC BLOOD PRESSURE: 81 MMHG | SYSTOLIC BLOOD PRESSURE: 120 MMHG | HEART RATE: 68 BPM | TEMPERATURE: 98.2 F | HEIGHT: 61 IN | OXYGEN SATURATION: 99 %

## 2025-05-05 PROCEDURE — 7100000010 HC PHASE II RECOVERY - FIRST 15 MIN: Performed by: UROLOGY

## 2025-05-05 PROCEDURE — 6360000002 HC RX W HCPCS: Performed by: NURSE ANESTHETIST, CERTIFIED REGISTERED

## 2025-05-05 PROCEDURE — 3700000000 HC ANESTHESIA ATTENDED CARE: Performed by: UROLOGY

## 2025-05-05 PROCEDURE — 7100000011 HC PHASE II RECOVERY - ADDTL 15 MIN: Performed by: UROLOGY

## 2025-05-05 PROCEDURE — 2580000003 HC RX 258: Performed by: UROLOGY

## 2025-05-05 PROCEDURE — 3600000012 HC SURGERY LEVEL 2 ADDTL 15MIN: Performed by: UROLOGY

## 2025-05-05 PROCEDURE — 3600000002 HC SURGERY LEVEL 2 BASE: Performed by: UROLOGY

## 2025-05-05 PROCEDURE — 6360000002 HC RX W HCPCS: Performed by: UROLOGY

## 2025-05-05 PROCEDURE — 6370000000 HC RX 637 (ALT 250 FOR IP): Performed by: UROLOGY

## 2025-05-05 PROCEDURE — 3700000001 HC ADD 15 MINUTES (ANESTHESIA): Performed by: UROLOGY

## 2025-05-05 PROCEDURE — 2500000003 HC RX 250 WO HCPCS: Performed by: UROLOGY

## 2025-05-05 PROCEDURE — 2709999900 HC NON-CHARGEABLE SUPPLY: Performed by: UROLOGY

## 2025-05-05 RX ORDER — OXYBUTYNIN CHLORIDE 5 MG/1
5 TABLET, EXTENDED RELEASE ORAL DAILY
Qty: 14 TABLET | Refills: 0 | Status: SHIPPED | OUTPATIENT
Start: 2025-05-05 | End: 2025-05-06 | Stop reason: ALTCHOICE

## 2025-05-05 RX ORDER — PHENAZOPYRIDINE HYDROCHLORIDE 100 MG/1
100 TABLET, FILM COATED ORAL 3 TIMES DAILY PRN
Qty: 15 TABLET | Refills: 0 | Status: SHIPPED | OUTPATIENT
Start: 2025-05-05 | End: 2025-05-10

## 2025-05-05 RX ORDER — IPRATROPIUM BROMIDE AND ALBUTEROL SULFATE 2.5; .5 MG/3ML; MG/3ML
1 SOLUTION RESPIRATORY (INHALATION) ONCE
Status: DISCONTINUED | OUTPATIENT
Start: 2025-05-05 | End: 2025-05-05 | Stop reason: HOSPADM

## 2025-05-05 RX ORDER — DOXYCYCLINE HYCLATE 100 MG
100 TABLET ORAL 2 TIMES DAILY
Qty: 6 TABLET | Refills: 0 | Status: SHIPPED | OUTPATIENT
Start: 2025-05-05 | End: 2025-05-06 | Stop reason: SINTOL

## 2025-05-05 RX ORDER — SODIUM CHLORIDE 9 MG/ML
INJECTION, SOLUTION INTRAVENOUS PRN
Status: DISCONTINUED | OUTPATIENT
Start: 2025-05-05 | End: 2025-05-05 | Stop reason: HOSPADM

## 2025-05-05 RX ORDER — HYDROCODONE BITARTRATE AND ACETAMINOPHEN 5; 325 MG/1; MG/1
1 TABLET ORAL ONCE
Refills: 0 | Status: COMPLETED | OUTPATIENT
Start: 2025-05-05 | End: 2025-05-05

## 2025-05-05 RX ORDER — SODIUM CHLORIDE 0.9 % (FLUSH) 0.9 %
5-40 SYRINGE (ML) INJECTION EVERY 12 HOURS SCHEDULED
Status: DISCONTINUED | OUTPATIENT
Start: 2025-05-05 | End: 2025-05-05 | Stop reason: HOSPADM

## 2025-05-05 RX ORDER — SODIUM CHLORIDE 0.9 % (FLUSH) 0.9 %
5-40 SYRINGE (ML) INJECTION PRN
Status: DISCONTINUED | OUTPATIENT
Start: 2025-05-05 | End: 2025-05-05 | Stop reason: HOSPADM

## 2025-05-05 RX ORDER — LIDOCAINE HYDROCHLORIDE 20 MG/ML
INJECTION, SOLUTION INTRAVENOUS
Status: DISCONTINUED | OUTPATIENT
Start: 2025-05-05 | End: 2025-05-05 | Stop reason: SDUPTHER

## 2025-05-05 RX ORDER — FENTANYL CITRATE 50 UG/ML
INJECTION, SOLUTION INTRAMUSCULAR; INTRAVENOUS
Status: DISCONTINUED | OUTPATIENT
Start: 2025-05-05 | End: 2025-05-05 | Stop reason: SDUPTHER

## 2025-05-05 RX ORDER — PROPOFOL 10 MG/ML
INJECTION, EMULSION INTRAVENOUS
Status: DISCONTINUED | OUTPATIENT
Start: 2025-05-05 | End: 2025-05-05 | Stop reason: SDUPTHER

## 2025-05-05 RX ORDER — MIDAZOLAM HYDROCHLORIDE 1 MG/ML
INJECTION, SOLUTION INTRAMUSCULAR; INTRAVENOUS
Status: DISCONTINUED | OUTPATIENT
Start: 2025-05-05 | End: 2025-05-05 | Stop reason: SDUPTHER

## 2025-05-05 RX ADMIN — SODIUM CHLORIDE: 0.9 INJECTION, SOLUTION INTRAVENOUS at 06:22

## 2025-05-05 RX ADMIN — HYDROCODONE BITARTRATE AND ACETAMINOPHEN 1 TABLET: 5; 325 TABLET ORAL at 08:17

## 2025-05-05 RX ADMIN — PROPOFOL 100 MG: 10 INJECTION, EMULSION INTRAVENOUS at 07:41

## 2025-05-05 RX ADMIN — WATER 2000 MG: 1 INJECTION INTRAMUSCULAR; INTRAVENOUS; SUBCUTANEOUS at 07:27

## 2025-05-05 RX ADMIN — LIDOCAINE HYDROCHLORIDE 100 MG: 20 INJECTION, SOLUTION INTRAVENOUS at 07:41

## 2025-05-05 RX ADMIN — MIDAZOLAM 2 MG: 1 INJECTION INTRAMUSCULAR; INTRAVENOUS at 07:36

## 2025-05-05 RX ADMIN — PROPOFOL 200 MCG/KG/MIN: 10 INJECTION, EMULSION INTRAVENOUS at 07:45

## 2025-05-05 RX ADMIN — PROPOFOL 60 MG: 10 INJECTION, EMULSION INTRAVENOUS at 07:52

## 2025-05-05 RX ADMIN — PROPOFOL 80 MG: 10 INJECTION, EMULSION INTRAVENOUS at 07:47

## 2025-05-05 RX ADMIN — FENTANYL CITRATE 50 MCG: 50 INJECTION, SOLUTION INTRAMUSCULAR; INTRAVENOUS at 07:45

## 2025-05-05 RX ADMIN — FENTANYL CITRATE 50 MCG: 50 INJECTION, SOLUTION INTRAMUSCULAR; INTRAVENOUS at 07:41

## 2025-05-05 RX ADMIN — PROPOFOL 50 MG: 10 INJECTION, EMULSION INTRAVENOUS at 07:43

## 2025-05-05 ASSESSMENT — PAIN - FUNCTIONAL ASSESSMENT
PAIN_FUNCTIONAL_ASSESSMENT: 0-10
PAIN_FUNCTIONAL_ASSESSMENT: PREVENTS OR INTERFERES WITH MANY ACTIVE NOT PASSIVE ACTIVITIES

## 2025-05-05 ASSESSMENT — LIFESTYLE VARIABLES: SMOKING_STATUS: 1

## 2025-05-05 ASSESSMENT — PAIN SCALES - GENERAL
PAINLEVEL_OUTOF10: 8
PAINLEVEL_OUTOF10: 8
PAINLEVEL_OUTOF10: 7
PAINLEVEL_OUTOF10: 9

## 2025-05-05 ASSESSMENT — PAIN DESCRIPTION - DESCRIPTORS: DESCRIPTORS: ACHING

## 2025-05-05 ASSESSMENT — PAIN DESCRIPTION - LOCATION: LOCATION: OTHER (COMMENT)

## 2025-05-05 NOTE — OP NOTE
issues.  2 % lidocaine jelly was placed per the urethra for pain control.  DSMO was instilled and held for 1 hr post op.  This concluded the case.  She was taken to recovery period and discharged home in stable condition.      Plan:  She will follow up with   GARRY in 3 months

## 2025-05-05 NOTE — PROGRESS NOTES
Patient back from surgery at this time. Pain 9/10, pain medication given. Ice pack given for patients left foot. Vitals stable. Will continue to monitor.

## 2025-05-05 NOTE — ANESTHESIA POSTPROCEDURE EVALUATION
Department of Anesthesiology  Postprocedure Note    Patient: Chelsey Juarez  MRN: [id number]  YOB: 1980  Date of evaluation: 5/5/2025    Procedure Summary       Date: 05/05/25 Room / Location: [facility]  / [facility] OR    Anesthesia Start: 0736 Anesthesia Stop: 0805    Procedure: CYSTOSCOPY HYDRODISTENTION WITH DSMO INSTILLATION Diagnosis:       Interstitial cystitis      (Interstitial cystitis [N30.10])    Surgeons: Steve Smith Jr., MD Responsible Provider: Milton Coleman DO    Anesthesia Type: General, MAC ASA Status: 3            Anesthesia Type: General, MAC    Kyara Phase I: Kyara Score: 10    Kyara Phase II:      Anesthesia Post Evaluation    Patient location during evaluation: bedside  Patient participation: complete - patient participated  Level of consciousness: awake and alert  Airway patency: patent  Nausea & Vomiting: no vomiting and no nausea  Cardiovascular status: hemodynamically stable  Respiratory status: acceptable, room air, nonlabored ventilation and spontaneous ventilation  Hydration status: stable  Pain management: adequate        No notable events documented.

## 2025-05-05 NOTE — ANESTHESIA PRE PROCEDURE
Department of Anesthesiology  Preprocedure Note       Name:  Chelsey Juarez   Age:  44 y.o.  :  1980                                          MRN:  [id number]         Date:  2025      Surgeon: Surgeon(s):  Steve Smith Jr., MD    Procedure: Procedure(s):  CYSTOSCOPY HYDRODISTENTION    Medications prior to admission:   Prior to Admission medications    Medication Sig Start Date End Date Taking? Authorizing Provider   montelukast (SINGULAIR) 10 MG tablet Take 1 tablet by mouth nightly 25  Yes Allison Alvarez, APRN - CNP   HYDROcodone-acetaminophen (NORCO) 5-325 MG per tablet Take 1 tablet by mouth every 4 hours as needed for Pain. 25  Yes ProviderBriana MD   ondansetron (ZOFRAN) 4 MG tablet Take 1 tablet by mouth every 8 hours as needed 3/26/25  Yes ProviderBriana MD   topiramate (TOPAMAX) 100 MG tablet Take 1 tablet by mouth daily 3/17/25  Yes ProviderBriana MD   polyethylene glycol (GLYCOLAX) 17 GM/SCOOP powder Take 17 g by mouth daily as needed (constipation) 25 Yes Chris Lambert, APRN - CNP   sennosides-docusate sodium (SENOKOT-S) 8.6-50 MG tablet Take 1 tablet by mouth daily 25 Yes Chris Lambert APRN - CNP   phenylephrine-mineral oil-petrolatum (PREPARATION H) 0.25-14-74.9 % rectal ointment Place rectally 2 times daily as needed for Hemorrhoids Apply externally at the swollen hemorrhoid 2/3/25  Yes Mundo Moncada MD   fluticasone-umeclidin-vilant (TRELEGY ELLIPTA) 200-62.5-25 MCG/ACT AEPB inhaler Inhale 1 puff into the lungs daily 24  Yes Karoline Prater, APRN - CNP   cyclobenzaprine (FLEXERIL) 10 MG tablet Take 1 tablet by mouth 3 times daily as needed for Muscle spasms   Yes ProviderBriana MD   ALPRAZolam (XANAX) 0.25 MG tablet Take 1 tablet by mouth every 6 hours as needed for Anxiety. 24  Yes Provider, Historical, MD   lidocaine (LIDODERM) 5 %  23  Yes Provider, Historical, MD   pantoprazole (PROTONIX) 40

## 2025-05-05 NOTE — PROGRESS NOTES
[facility]  Therapy Contact Note      Date: 2025  Patient Name: Chelsey Juarez        MRN: [id number]    Account Number: [id number]  : 1980  (44 y.o.)  Gender: female     Set up [facility] for 25 930.  time is 30 minutes before listed appointment time. Patient's voicemail was full so this Rehab Tech was unable to leave a message.     Priya Dixon, Rehab Tech

## 2025-05-05 NOTE — DISCHARGE INSTRUCTIONS
Discharge instructions: Cystoscopy  You May experience painful urination and see blood in the urine after your procedure.  This should resolve over time.      Pt ok to discharge home in good condition  No heavy lifting, >10 lbs for today  Pt should avoid strenuous activity for today  Pt should walk moderately at home  Pt ok to shower   Pt may resume diet as tolerated  Please call attending physician or hospital  with questions  Call or Present to ED if fever (> 101F), intractable nausea vomiting or pain.  Rx in chart     Pt should follow up with Dr. Smith , in 3 months, call to confirm appointment

## 2025-05-05 NOTE — PROGRESS NOTES
Patient oriented to Same Day department and admitted to Same Day Surgery room 6.   Patient verbalized approval for first name, last initial with physician name on unit whiteboard.     Plan of care reviewed with patient.   Patient room whiteboard filled out and discussed with patient and responsible adult.   Patient and responsible adult offered Same Day Welcome Packet to review.    Call light in reach.   Bed in lowest position, locked, with one bed rail up.   SCDs and warming blanket in place.  Appropriate arm bands on patient.   Bathroom offered.   All questions and concerns of patient addressed.        Meds to Beds:   Patient informed of [facility] Meds to Beds program during admission. Patient has declined use of program.   Contact information for the pharmacy and the Meds to Beds program:   Name: Chelsey   Relationship to patient:patient   Phone number: DECLINES

## 2025-05-05 NOTE — H&P
[facility]  Urology H&P Note     Patient:  Chelsey Juarez  MRN: [id number]  YOB: 1980    ATTENDING: Steve Smith Jr, MD     CHIEF COMPLAINT:  Unusual bladder symptoms    HISTORY OF PRESENT ILLNESS:   The patient is a 44 y.o. female who presents with unusual bladder symptoms    Patient's old records, notes and chart reviewed and summarized above.     Past Medical History:    Past Medical History:   Diagnosis Date    Airway obstruction     Back pain     chronic    Cystitis, interstitial     Esophageal ulcer     GERD (gastroesophageal reflux disease)     Headache(784.0)     Hyperlipidemia     Insulin resistance     Irritable bowel syndrome     Spondylolisthesis     Spondylolysis        Past Surgical History:    Past Surgical History:   Procedure Laterality Date    ABDOMINAL ADHESION SURGERY  01/01/2012    Dr. Guardado    ANKLE SURGERY Right 2011    COLONOSCOPY  2005, 2011    Dr. Brooks    CYST REMOVAL  01/01/2011    right ankle-Dr. Hernandez    HEMORRHOID SURGERY N/A 2/6/2025    Excision thrombosed hemorrhoid performed by Raffi Vines MD at [facility] OR    HYSTERECTOMY (CERVIX STATUS UNKNOWN)  2010    Dr. Guardado    OVARY REMOVAL  2010    SHOULDER ARTHROSCOPY Left 04/17/2023    LEFT SHOULDER ARTHROSCOPY, BICEP TENODESIS, DEBRIDEMENT, DISTAL CLAVICLE EXCISION performed by Maikol Joshi DO at [facility] OR    TONSILLECTOMY AND ADENOIDECTOMY  01/01/1987    UPPER GASTROINTESTINAL ENDOSCOPY  06/2024    early june per patient    WISDOM TOOTH EXTRACTION  01/01/1996       Medications Prior to Admission:   Prior to Admission medications    Medication Sig Start Date End Date Taking? Authorizing Provider   montelukast (SINGULAIR) 10 MG tablet Take 1 tablet by mouth nightly 4/30/25  Yes Allison Alvarez N, APRN - CNP   HYDROcodone-acetaminophen (NORCO) 5-325 MG per tablet Take 1 tablet by mouth every 4 hours as needed for Pain. 4/9/25  Yes Provider, MD Briana   ondansetron (ZOFRAN) 4 MG tablet Take 1 tablet

## 2025-05-05 NOTE — PROGRESS NOTES
Pt returned to SDS room 6 Vitals and assessment as charted. 0.9 infusing, to count from PACU. Pt has crackers and water. Family at the bedside. Pt and family verbalized understanding of discharge criteria and call light use. Call light in reach.

## 2025-05-06 ENCOUNTER — HOSPITAL ENCOUNTER (EMERGENCY)
Age: 45
Discharge: HOME | End: 2025-05-07
Attending: EMERGENCY MEDICINE
Payer: COMMERCIAL

## 2025-05-06 ENCOUNTER — TELEPHONE (OUTPATIENT)
Dept: UROLOGY | Age: 45
End: 2025-05-06

## 2025-05-06 ENCOUNTER — APPOINTMENT (OUTPATIENT)
Dept: CT IMAGING | Age: 45
End: 2025-05-06
Payer: COMMERCIAL

## 2025-05-06 VITALS
RESPIRATION RATE: 14 BRPM | HEART RATE: 84 BPM | SYSTOLIC BLOOD PRESSURE: 122 MMHG | DIASTOLIC BLOOD PRESSURE: 68 MMHG | OXYGEN SATURATION: 98 % | TEMPERATURE: 98.5 F

## 2025-05-06 DIAGNOSIS — L50.9 URTICARIA: ICD-10-CM

## 2025-05-06 DIAGNOSIS — N30.10 INTERSTITIAL CYSTITIS: Primary | ICD-10-CM

## 2025-05-06 DIAGNOSIS — T88.7XXA MEDICATION SIDE EFFECT: ICD-10-CM

## 2025-05-06 DIAGNOSIS — R50.82 POSTOPERATIVE FEVER: Primary | ICD-10-CM

## 2025-05-06 LAB
ALBUMIN SERPL BCG-MCNC: 3.8 G/DL (ref 3.4–4.9)
ALP SERPL-CCNC: 81 U/L (ref 38–126)
ALT SERPL W/O P-5'-P-CCNC: 18 U/L (ref 10–35)
ANION GAP SERPL CALC-SCNC: 12 MEQ/L (ref 8–16)
AST SERPL-CCNC: 16 U/L (ref 10–35)
BASOPHILS ABSOLUTE: 0 THOU/MM3 (ref 0–0.1)
BASOPHILS NFR BLD AUTO: 0.1 %
BILIRUB CONJ SERPL-MCNC: < 0.1 MG/DL (ref 0–0.2)
BILIRUB SERPL-MCNC: < 0.2 MG/DL (ref 0.3–1.2)
BUN SERPL-MCNC: 12 MG/DL (ref 8–23)
CALCIUM SERPL-MCNC: 9.5 MG/DL (ref 8.6–10)
CHLORIDE SERPL-SCNC: 103 MEQ/L (ref 98–111)
CO2 SERPL-SCNC: 26 MEQ/L (ref 22–29)
CREAT SERPL-MCNC: 0.7 MG/DL (ref 0.5–0.9)
DEPRECATED RDW RBC AUTO: 43.7 FL (ref 35–45)
EOSINOPHIL NFR BLD AUTO: 1.6 %
EOSINOPHILS ABSOLUTE: 0.1 THOU/MM3 (ref 0–0.4)
ERYTHROCYTE [DISTWIDTH] IN BLOOD BY AUTOMATED COUNT: 13.2 % (ref 11.5–14.5)
GFR SERPL CREATININE-BSD FRML MDRD: > 90 ML/MIN/1.73M2
GLUCOSE SERPL-MCNC: 99 MG/DL (ref 74–109)
HCT VFR BLD AUTO: 37.2 % (ref 37–47)
HGB BLD-MCNC: 12.6 GM/DL (ref 12–16)
IMM GRANULOCYTES # BLD AUTO: 0.03 THOU/MM3 (ref 0–0.07)
IMM GRANULOCYTES NFR BLD AUTO: 0.4 %
LIPASE SERPL-CCNC: 40 U/L (ref 13–60)
LYMPHOCYTES ABSOLUTE: 3.8 THOU/MM3 (ref 1–4.8)
LYMPHOCYTES NFR BLD AUTO: 46.8 %
MCH RBC QN AUTO: 31 PG (ref 26–33)
MCHC RBC AUTO-ENTMCNC: 33.9 GM/DL (ref 32.2–35.5)
MCV RBC AUTO: 91.4 FL (ref 81–99)
MONOCYTES ABSOLUTE: 0.6 THOU/MM3 (ref 0.4–1.3)
MONOCYTES NFR BLD AUTO: 6.8 %
NEUTROPHILS ABSOLUTE: 3.6 THOU/MM3 (ref 1.8–7.7)
NEUTROPHILS NFR BLD AUTO: 44.3 %
NRBC BLD AUTO-RTO: 0 /100 WBC
OSMOLALITY SERPL CALC.SUM OF ELEC: 281 MOSMOL/KG (ref 275–300)
PLATELET # BLD AUTO: 260 THOU/MM3 (ref 130–400)
PMV BLD AUTO: 11.5 FL (ref 9.4–12.4)
POTASSIUM SERPL-SCNC: 4.2 MEQ/L (ref 3.5–5.2)
PROT SERPL-MCNC: 6.3 G/DL (ref 6.4–8.3)
RBC # BLD AUTO: 4.07 MILL/MM3 (ref 4.2–5.4)
SODIUM SERPL-SCNC: 141 MEQ/L (ref 135–145)
WBC # BLD AUTO: 8.2 THOU/MM3 (ref 4.8–10.8)

## 2025-05-06 PROCEDURE — 99285 EMERGENCY DEPT VISIT HI MDM: CPT

## 2025-05-06 PROCEDURE — 85025 COMPLETE CBC W/AUTO DIFF WBC: CPT

## 2025-05-06 PROCEDURE — 6360000002 HC RX W HCPCS: Performed by: EMERGENCY MEDICINE

## 2025-05-06 PROCEDURE — 93005 ELECTROCARDIOGRAM TRACING: CPT | Performed by: EMERGENCY MEDICINE

## 2025-05-06 PROCEDURE — 82248 BILIRUBIN DIRECT: CPT

## 2025-05-06 PROCEDURE — 83690 ASSAY OF LIPASE: CPT

## 2025-05-06 PROCEDURE — 96375 TX/PRO/DX INJ NEW DRUG ADDON: CPT

## 2025-05-06 PROCEDURE — 96374 THER/PROPH/DIAG INJ IV PUSH: CPT

## 2025-05-06 PROCEDURE — 36415 COLL VENOUS BLD VENIPUNCTURE: CPT

## 2025-05-06 PROCEDURE — 80053 COMPREHEN METABOLIC PANEL: CPT

## 2025-05-06 PROCEDURE — 71275 CT ANGIOGRAPHY CHEST: CPT

## 2025-05-06 PROCEDURE — 6360000004 HC RX CONTRAST MEDICATION: Performed by: EMERGENCY MEDICINE

## 2025-05-06 RX ORDER — IOPAMIDOL 755 MG/ML
80 INJECTION, SOLUTION INTRAVASCULAR
Status: COMPLETED | OUTPATIENT
Start: 2025-05-06 | End: 2025-05-06

## 2025-05-06 RX ORDER — CEFDINIR 300 MG/1
300 CAPSULE ORAL 2 TIMES DAILY
Qty: 6 CAPSULE | Refills: 0 | Status: SHIPPED | OUTPATIENT
Start: 2025-05-06 | End: 2025-05-09

## 2025-05-06 RX ORDER — PROCHLORPERAZINE EDISYLATE 5 MG/ML
10 INJECTION INTRAMUSCULAR; INTRAVENOUS ONCE
Status: COMPLETED | OUTPATIENT
Start: 2025-05-06 | End: 2025-05-06

## 2025-05-06 RX ORDER — KETOROLAC TROMETHAMINE 30 MG/ML
15 INJECTION, SOLUTION INTRAMUSCULAR; INTRAVENOUS ONCE
Status: COMPLETED | OUTPATIENT
Start: 2025-05-06 | End: 2025-05-06

## 2025-05-06 RX ORDER — OXYBUTYNIN CHLORIDE 5 MG/1
5 TABLET ORAL 3 TIMES DAILY PRN
Qty: 30 TABLET | Refills: 0 | Status: SHIPPED | OUTPATIENT
Start: 2025-05-06 | End: 2025-05-16

## 2025-05-06 RX ADMIN — IOPAMIDOL 80 ML: 755 INJECTION, SOLUTION INTRAVENOUS at 22:43

## 2025-05-06 RX ADMIN — PROCHLORPERAZINE EDISYLATE 10 MG: 5 INJECTION INTRAMUSCULAR; INTRAVENOUS at 22:23

## 2025-05-06 RX ADMIN — KETOROLAC TROMETHAMINE 15 MG: 30 INJECTION, SOLUTION INTRAMUSCULAR at 22:23

## 2025-05-06 ASSESSMENT — PAIN SCALES - GENERAL
PAINLEVEL_OUTOF10: 9
PAINLEVEL_OUTOF10: 8
PAINLEVEL_OUTOF10: 5

## 2025-05-06 ASSESSMENT — PAIN - FUNCTIONAL ASSESSMENT: PAIN_FUNCTIONAL_ASSESSMENT: 0-10

## 2025-05-06 ASSESSMENT — PAIN DESCRIPTION - LOCATION: LOCATION: FOOT;PELVIS

## 2025-05-06 NOTE — TELEPHONE ENCOUNTER
Agree with plan of care. She was given multiple medications so it will be very difficult to identify allergen.

## 2025-05-06 NOTE — TELEPHONE ENCOUNTER
Patient states she has temp of 99.3. She feels ok. She is going to continue to monitor it.    Advised if the temperature spikes over 100.5 or 101 to be evaluated in the ER.

## 2025-05-06 NOTE — TELEPHONE ENCOUNTER
Patient called in stating she has hives and erythema across her chest, face, and arms. She denies chest tightness, shortness of breath, or other signs of anaphylaxis. She knows she is allergic to the cardiac stickers and removed those immediately after surgery. The only medication change she noticed following the surgery is doxycycline. She is unsure whether she has taken this in the past and knows her family has a hx of severe penicillin allergies. Instructed over the phone to stop doxycycline and take antihistamines for reaction and present to ED if signs of anaphylaxis occur.         Please see how patient is doing this morning and let her know I sent a different antibiotic to the pharmacy.

## 2025-05-06 NOTE — TELEPHONE ENCOUNTER
Patient would like to take the immediate release instead since she knows she does ok with that one.    Or is there a reason she needs the extended release

## 2025-05-06 NOTE — TELEPHONE ENCOUNTER
Patient thinks its the extended release of oxybutynin that caused the reaction.    Before she was on the immediate release and didn't have a problem with it.    She has not taken any bladder medication today.

## 2025-05-06 NOTE — TELEPHONE ENCOUNTER
Patient still has hives. She took Benadryl last night. It has not gotten any worse.    She voiced understanding to go to the ER if her symptoms worsen, problems breathing, facial edema, or difficulty swallowing.    She does not know if her reaction was caused from the hydo-distension.

## 2025-05-07 LAB
EKG ATRIAL RATE: 82 BPM
EKG P AXIS: 43 DEGREES
EKG P-R INTERVAL: 128 MS
EKG Q-T INTERVAL: 392 MS
EKG QRS DURATION: 84 MS
EKG QTC CALCULATION (BAZETT): 457 MS
EKG R AXIS: 69 DEGREES
EKG T AXIS: 67 DEGREES
EKG VENTRICULAR RATE: 82 BPM

## 2025-05-07 PROCEDURE — 93010 ELECTROCARDIOGRAM REPORT: CPT | Performed by: INTERNAL MEDICINE

## 2025-05-07 NOTE — ED NOTES
PT resting in bed. PT notes decreased headache. VS assessed. Lights dimmed for comfort. Call light in  reach.

## 2025-05-07 NOTE — ED NOTES
Pt to er. Pt states she had a cystoscopy yesterday and was put on ATB. Pt states thinks may be having a reaction to it. Broke out in a rash.  has taken 2 benadryl and did help some.  also has HA.  did also have a fever off and on. Pt states just doesn't feel well. Pt states she did inform her doctor of the rash and they switched her ATB.

## 2025-05-07 NOTE — ED PROVIDER NOTES
[facility] EMERGENCY SERVICES ENCOUNTER        PATIENT NAME: Chelsey Juarez  MRN: [id number]  : 1980  HO: 2025  PROVIDER: Luiz Benavidez MD    Patient was seen and evaluated at 10:03 PM EDT. Nurses Notes are reviewed and I agree except as noted in the HPI.  Chief Complaint   Patient presents with    Post-op Problem    Fever     HISTORY OF PRESENT ILLNESS     A 44-year-old woman presents with postop fever and hives.  Patient states she is status post cystoscopy with hydrodistention and DSMO for IC at [facility].  She went home and was prescribed oxybutynin, doxycycline, and Pyridium.  She then developed hives yesterday after she took the medications. Per urologist on-call recommendation, she self medicated with Benadryl.  Hives improved.  Patient developed low-grade fever with T of 99.6 today.  Patient currently has no fever.  She has been having right upper quadrant pain after surgery also.  She has no chills.  No SOB.  No vomiting.  She has mild dysuria and hematuria after surgery.  Given concerns of antibiotics allergy, patient's doxycycline was switched to Omnicef by urology.     PAST MEDICAL HISTORY    has a past medical history of Airway obstruction, Back pain, Cystitis, interstitial, Esophageal ulcer, GERD (gastroesophageal reflux disease), Headache(784.0), Hyperlipidemia, Insulin resistance, Irritable bowel syndrome, Spondylolisthesis, and Spondylolysis.    SURGICAL HISTORY      has a past surgical history that includes cyst removal (2011); Hysterectomy (); Abdominal adhesion surgery (2012); Colonoscopy (, ); Tonsillectomy and adenoidectomy (1987); [place] tooth extraction (1996); Ovary removal (); Shoulder arthroscopy (Left, 2023); Ankle surgery (Right, ); Upper gastrointestinal endoscopy (2024); Hemorrhoid surgery (N/A, 2025); and Cystoscopy (N/A, 2025).    CURRENT MEDICATIONS       Discharge Medication List as of

## 2025-05-21 ENCOUNTER — HOSPITAL ENCOUNTER (OUTPATIENT)
Dept: PHYSICAL THERAPY | Age: 45
Setting detail: THERAPIES SERIES
Discharge: HOME | End: 2025-05-21

## 2025-05-28 ENCOUNTER — OFFICE VISIT (OUTPATIENT)
Dept: UROLOGY | Age: 45
End: 2025-05-28
Payer: COMMERCIAL

## 2025-05-28 VITALS — BODY MASS INDEX: 40.22 KG/M2 | RESPIRATION RATE: 20 BRPM | WEIGHT: 213 LBS | HEIGHT: 61 IN

## 2025-05-28 DIAGNOSIS — N30.10 INTERSTITIAL CYSTITIS: Primary | ICD-10-CM

## 2025-05-28 LAB
BILIRUBIN, URINE: NEGATIVE
BLOOD URINE, POC: NEGATIVE
CHARACTER, URINE: CLEAR
COLOR, UA: YELLOW
GLUCOSE URINE: NEGATIVE MG/DL
KETONES, URINE: NEGATIVE
LEUKOCYTE CLUMPS, URINE: NEGATIVE
NITRITE, URINE: NEGATIVE
PH, URINE: 5.5 (ref 5–9)
POST VOID RESIDUAL (PVR): 3 ML
PROTEIN, URINE: NEGATIVE MG/DL
SPECIFIC GRAVITY UA: <= 1.005 (ref 1–1.03)
UROBILINOGEN, URINE: 0.2 EU/DL (ref 0–1)

## 2025-05-28 PROCEDURE — 99214 OFFICE O/P EST MOD 30 MIN: CPT

## 2025-05-28 PROCEDURE — 81003 URINALYSIS AUTO W/O SCOPE: CPT

## 2025-05-28 PROCEDURE — G8417 CALC BMI ABV UP PARAM F/U: HCPCS

## 2025-05-28 PROCEDURE — G8427 DOCREV CUR MEDS BY ELIG CLIN: HCPCS

## 2025-05-28 PROCEDURE — 4004F PT TOBACCO SCREEN RCVD TLK: CPT

## 2025-05-28 PROCEDURE — 51798 US URINE CAPACITY MEASURE: CPT

## 2025-05-28 RX ORDER — OXYBUTYNIN CHLORIDE 5 MG/1
5 TABLET ORAL 3 TIMES DAILY PRN
Qty: 30 TABLET | Refills: 2 | Status: SHIPPED | OUTPATIENT
Start: 2025-05-28 | End: 2025-07-27

## 2025-05-28 NOTE — PROGRESS NOTES
[facility] [place] [facility]  [facility] UROLOGY  [address]  [address]  [facility] [address]  Dept: [phone]  Loc: [phone]    Visit Date: 5/28/2025        HPI:     HPI  Ms. Juarez is a 44-year-old female that presents in follow-up    Presents with concerns of IC. 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 Notes undergoing bladder installation/ rinse several times- last procedure in 2010. Has historically taken Elmiron for management of symptomatology. 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 Failed amitriptyline, as well. Cystoscopy with Dr. Steve Smith in 5/2024 was unremarkable. CT imaging in 9/2024 was negative for acute  pathology. Ditropan and Pyridium prescribed for management of bladder pain.  However, given ongoing concerns of her liver function she prefers to avoid medication management.  She therefore underwent a cystoscopy, hydrodistension with DMSO installation with Dr. Steve Smith on 5/5/2025. This was helpful. Willing to otherwise take Ditropan 5 mg prn for flares.     Surgical hx remarkable for hysterectomy.      General medical health is notable for fibromyalgia. Does take high dose marijuana for management of bladder pain and back pain. She is planning to undergo a neck and back procedure at some point. Also planning to see endocrinology for possible adrenal insufficiency.     Current Outpatient Medications   Medication Sig Dispense Refill    oxyBUTYnin (DITROPAN) 5 MG tablet Take 1 tablet by mouth 3 times daily as needed (bladder spasms) 30 tablet 0    montelukast (SINGULAIR) 10 MG tablet Take 1 tablet by mouth nightly 90 tablet 3    HYDROcodone-acetaminophen (NORCO) 5-325 MG per tablet Take 1 tablet by mouth every 4 hours as needed for Pain.      ondansetron (ZOFRAN) 4 MG tablet Take 1 tablet by mouth every 8 hours as needed      topiramate (TOPAMAX) 100 MG tablet Take 1 tablet by mouth daily

## 2025-05-28 NOTE — PATIENT INSTRUCTIONS
Avoid bladder irritants  Ditropan 5 mg as needed   Follow-up in 3 months   Call with questions, comments, or concerns. I recommend going to the ED for further evaluation if you develop fever, chills, nausea, vomiting, chest pain, SOB, or calf pain.    The medication list included in this document is our record of what you are currently taking, including any changes that were made at today's visit.  If you find any differences when compared to your medications at home, or have any questions that were not answered at your visit, please contact the office.

## 2025-05-29 ENCOUNTER — HOSPITAL ENCOUNTER (OUTPATIENT)
Dept: PHYSICAL THERAPY | Age: 45
Setting detail: THERAPIES SERIES
Discharge: HOME | End: 2025-05-29
Payer: COMMERCIAL

## 2025-05-29 PROCEDURE — 97110 THERAPEUTIC EXERCISES: CPT

## 2025-05-29 PROCEDURE — 97162 PT EVAL MOD COMPLEX 30 MIN: CPT

## 2025-05-29 PROCEDURE — 97140 MANUAL THERAPY 1/> REGIONS: CPT
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
## 2025-06-03 ENCOUNTER — HOSPITAL ENCOUNTER (OUTPATIENT)
Dept: PHYSICAL THERAPY | Age: 45
Setting detail: THERAPIES SERIES
Discharge: HOME | End: 2025-06-03
Payer: COMMERCIAL

## 2025-06-03 PROCEDURE — 97140 MANUAL THERAPY 1/> REGIONS: CPT

## 2025-06-03 PROCEDURE — 97110 THERAPEUTIC EXERCISES: CPT

## 2025-06-03 NOTE — PROGRESS NOTES
Physical Therapy    [facility]  PHYSICAL THERAPY  [] EVALUATION  [x] DAILY NOTE (LAND) [] DAILY NOTE (AQUATIC ) [] PROGRESS NOTE [] DISCHARGE NOTE    [x] OUTPATIENT REHABILITATION CENTER [facility]   [] [facility] CARE [place]    [] [facility]   [facility]    Date: 6/3/2025  Patient Name:  Chelsey Juarez  : 1980  MRN: [id number]  CSN: [id number]    Referring Practitioner Darshan Suazo APRN - CNP [phone]      Diagnosis  Diagnoses       M89.8X7 (ICD-10-CM) - Other specified disorders of bone, ankle and foot    M76.72 (ICD-10-CM) - Peroneal tendinitis, left leg    M19.072 (ICD-10-CM) - Primary osteoarthritis, left ankle and foot    M25.372 (ICD-10-CM) - Other instability, left ankle    Z47.89 (ICD-10-CM) - Encounter for other orthopedic aftercare           Treatment Diagnosis M25.572 Pain in left ankle  M25.672 Stiffness of left ankle, not elsewhere classified  M62.81 Generalized Muscle Weakness  R26.2 Difficulty in walking  Z47.89 Encounter for orthopedic aftercare   Date of Evaluation 25   Additional Pertinent History Chelsey Juarez has a past medical history of Airway obstruction, Back pain, Cystitis, interstitial, Esophageal ulcer, GERD (gastroesophageal reflux disease), Headache(784.0), Hyperlipidemia, Insulin resistance, Irritable bowel syndrome, Spondylolisthesis, and Spondylolysis.  she has a past surgical history that includes cyst removal (2011); Hysterectomy (); Abdominal adhesion surgery (2012); Colonoscopy (, ); Tonsillectomy and adenoidectomy (1987); [place] tooth extraction (1996); Ovary removal (); Shoulder arthroscopy (Left, 2023); Ankle surgery (Right, ); Upper gastrointestinal endoscopy (2024); Hemorrhoid surgery (N/A, 2025); and Cystoscopy (N/A, 2025).     Allergies Allergies   Allergen Reactions    Latex Rash and Other (See Comments)    Other/Food Hives     Patient stated

## 2025-06-06 ENCOUNTER — HOSPITAL ENCOUNTER (OUTPATIENT)
Dept: PHYSICAL THERAPY | Age: 45
Setting detail: THERAPIES SERIES
Discharge: HOME | End: 2025-06-06
Payer: COMMERCIAL

## 2025-06-06 PROCEDURE — 97110 THERAPEUTIC EXERCISES: CPT

## 2025-06-06 NOTE — PROGRESS NOTES
Physical Therapy    [facility]  PHYSICAL THERAPY  [] EVALUATION  [x] DAILY NOTE (LAND) [] DAILY NOTE (AQUATIC ) [] PROGRESS NOTE [] DISCHARGE NOTE    [x] OUTPATIENT REHABILITATION CENTER [facility]   [] [facility] CARE [place]    [] [facility]   [] [facility]    Date: 2025  Patient Name:  Chelsey Juarez  : 1980  MRN: [id number]  CSN: [id number]    Referring Practitioner Darshan Suazo APRN - CNP [id number]      Diagnosis  Diagnoses       M89.8X7 (ICD-10-CM) - Other specified disorders of bone, ankle and foot    M76.72 (ICD-10-CM) - Peroneal tendinitis, left leg    M19.072 (ICD-10-CM) - Primary osteoarthritis, left ankle and foot    M25.372 (ICD-10-CM) - Other instability, left ankle    Z47.89 (ICD-10-CM) - Encounter for other orthopedic aftercare           Treatment Diagnosis M25.572 Pain in left ankle  M25.672 Stiffness of left ankle, not elsewhere classified  M62.81 Generalized Muscle Weakness  R26.2 Difficulty in walking  Z47.89 Encounter for orthopedic aftercare   Date of Evaluation 25   Additional Pertinent History Chelsey Juarez has a past medical history of Airway obstruction, Back pain, Cystitis, interstitial, Esophageal ulcer, GERD (gastroesophageal reflux disease), Headache(784.0), Hyperlipidemia, Insulin resistance, Irritable bowel syndrome, Spondylolisthesis, and Spondylolysis.  she has a past surgical history that includes cyst removal (2011); Hysterectomy (); Abdominal adhesion surgery (2012); Colonoscopy (, ); Tonsillectomy and adenoidectomy (1987); [place] tooth extraction (1996); Ovary removal (); Shoulder arthroscopy (Left, 2023); Ankle surgery (Right, ); Upper gastrointestinal endoscopy (2024); Hemorrhoid surgery (N/A, 2025); and Cystoscopy (N/A, 2025).     Allergies Allergies   Allergen Reactions    Latex Rash and Other (See Comments)    Other/Food Hives     Patient stated

## 2025-06-09 ENCOUNTER — HOSPITAL ENCOUNTER (OUTPATIENT)
Dept: PHYSICAL THERAPY | Age: 45
Setting detail: THERAPIES SERIES
Discharge: HOME | End: 2025-06-09
Payer: COMMERCIAL

## 2025-06-09 PROCEDURE — 97110 THERAPEUTIC EXERCISES: CPT

## 2025-06-09 PROCEDURE — 97140 MANUAL THERAPY 1/> REGIONS: CPT

## 2025-06-09 NOTE — PROGRESS NOTES
Modify plan of care as follows:    []  Hold pending physician visit  []  Discharge    Time In 1530   Time Out 1618   Timed Code Minutes: 48 min   Total Treatment Time: 48 min       Electronically Signed by: Minda Castro PTA

## 2025-06-11 ENCOUNTER — HOSPITAL ENCOUNTER (OUTPATIENT)
Dept: PHYSICAL THERAPY | Age: 45
Setting detail: THERAPIES SERIES
Discharge: HOME | End: 2025-06-11
Payer: COMMERCIAL

## 2025-06-11 ENCOUNTER — TELEPHONE (OUTPATIENT)
Age: 45
End: 2025-06-11

## 2025-06-11 PROCEDURE — 97110 THERAPEUTIC EXERCISES: CPT

## 2025-06-11 PROCEDURE — 97140 MANUAL THERAPY 1/> REGIONS: CPT

## 2025-06-11 NOTE — PROGRESS NOTES
provided.  []  Patient unable to verbalize and/or demonstrate understanding of education provided.  Will continue education.  []  Barriers to learning:     PLAN:  Treatment Recommendations: Strengthening, Range of Motion, Balance Training, Functional Mobility Training, Transfer Training, Endurance Training, Gait Training, Stair Training, Neuromuscular Re-education, Manual Therapy - Soft Tissue Mobilization, Pain Management, Home Exercise Program, Safety Education and Training, Self-Care Education and Training, Modalities, Neuropathy Management, and Body Mechanics/Ergonomic Training    []  Plan of care initiated.  Plan to see patient 2 times per week for 6 weeks to address the treatment planned outlined above.  [x]  Continue with current plan of care  []  Modify plan of care as follows:    []  Hold pending physician visit  []  Discharge    Time In 1259   Time Out 1344   Timed Code Minutes: 45 min   Total Treatment Time: 45 min       Electronically Signed by: Alexa Jeffery PTA

## 2025-06-11 NOTE — TELEPHONE ENCOUNTER
Called over to [employer] to schedule transportation for appt on 06/19. Patient is scheduled to be picked up at 2:00 pm.       Notified patient. Patient understands.

## 2025-06-12 ENCOUNTER — HOSPITAL ENCOUNTER (OUTPATIENT)
Age: 45
Discharge: HOME | End: 2025-06-12
Payer: COMMERCIAL

## 2025-06-12 LAB
ALBUMIN SERPL BCG-MCNC: 4.3 G/DL (ref 3.4–4.9)
ALP SERPL-CCNC: 91 U/L (ref 38–126)
ALT SERPL W/O P-5'-P-CCNC: 25 U/L (ref 10–35)
AMYLASE SERPL-CCNC: 65 U/L (ref 28–100)
ANION GAP SERPL CALC-SCNC: 13 MEQ/L (ref 8–16)
AST SERPL-CCNC: 20 U/L (ref 10–35)
BILIRUB SERPL-MCNC: 0.3 MG/DL (ref 0.3–1.2)
BUN SERPL-MCNC: 11 MG/DL (ref 8–23)
CALCIUM SERPL-MCNC: 9.9 MG/DL (ref 8.6–10)
CHLORIDE SERPL-SCNC: 103 MEQ/L (ref 98–111)
CO2 SERPL-SCNC: 24 MEQ/L (ref 22–29)
CORTIS SERPL-MCNC: 5.4 UG/DL
CORTISOL COLLECTION INFO: NORMAL
CREAT SERPL-MCNC: 0.8 MG/DL (ref 0.5–0.9)
GFR SERPL CREATININE-BSD FRML MDRD: > 90 ML/MIN/1.73M2
GLUCOSE SERPL-MCNC: 99 MG/DL (ref 74–109)
LIPASE SERPL-CCNC: 33 U/L (ref 13–60)
POTASSIUM SERPL-SCNC: 4.3 MEQ/L (ref 3.5–5.2)
PROLACTIN SERPL-MCNC: 4.7 NG/ML
PROT SERPL-MCNC: 7.2 G/DL (ref 6.4–8.3)
SODIUM SERPL-SCNC: 140 MEQ/L (ref 135–145)

## 2025-06-12 PROCEDURE — 36415 COLL VENOUS BLD VENIPUNCTURE: CPT

## 2025-06-12 PROCEDURE — 83690 ASSAY OF LIPASE: CPT

## 2025-06-12 PROCEDURE — 82150 ASSAY OF AMYLASE: CPT

## 2025-06-12 PROCEDURE — 84146 ASSAY OF PROLACTIN: CPT

## 2025-06-12 PROCEDURE — 82533 TOTAL CORTISOL: CPT

## 2025-06-12 PROCEDURE — 80053 COMPREHEN METABOLIC PANEL: CPT

## 2025-06-16 ENCOUNTER — HOSPITAL ENCOUNTER (OUTPATIENT)
Dept: PHYSICAL THERAPY | Age: 45
Setting detail: THERAPIES SERIES
Discharge: HOME | End: 2025-06-16
Payer: COMMERCIAL

## 2025-06-16 PROCEDURE — 97140 MANUAL THERAPY 1/> REGIONS: CPT

## 2025-06-16 PROCEDURE — 97110 THERAPEUTIC EXERCISES: CPT

## 2025-06-16 NOTE — PROGRESS NOTES
Physical Therapy    [facility]  PHYSICAL THERAPY  [] EVALUATION  [x] DAILY NOTE (LAND) [] DAILY NOTE (AQUATIC ) [] PROGRESS NOTE [] DISCHARGE NOTE    [x] OUTPATIENT REHABILITATION CENTER [facility]   [] [facility] CARE [place]    [] [facility]   [facility]    Date: 2025  Patient Name:  Chelsey Juarez  : 1980  MRN: [id number]  CSN: [id number]    Referring Practitioner Darshan Suazo APRN - CNP [phone]      Diagnosis  Diagnoses       M89.8X7 (ICD-10-CM) - Other specified disorders of bone, ankle and foot    M76.72 (ICD-10-CM) - Peroneal tendinitis, left leg    M19.072 (ICD-10-CM) - Primary osteoarthritis, left ankle and foot    M25.372 (ICD-10-CM) - Other instability, left ankle    Z47.89 (ICD-10-CM) - Encounter for other orthopedic aftercare           Treatment Diagnosis M25.572 Pain in left ankle  M25.672 Stiffness of left ankle, not elsewhere classified  M62.81 Generalized Muscle Weakness  R26.2 Difficulty in walking  Z47.89 Encounter for orthopedic aftercare   Date of Evaluation 25   Additional Pertinent History Chelsey Juarez has a past medical history of Airway obstruction, Back pain, Cystitis, interstitial, Esophageal ulcer, GERD (gastroesophageal reflux disease), Headache(784.0), Hyperlipidemia, Insulin resistance, Irritable bowel syndrome, Spondylolisthesis, and Spondylolysis.  she has a past surgical history that includes cyst removal (2011); Hysterectomy (); Abdominal adhesion surgery (2012); Colonoscopy (, ); Tonsillectomy and adenoidectomy (1987); [place] tooth extraction (1996); Ovary removal (); Shoulder arthroscopy (Left, 2023); Ankle surgery (Right, ); Upper gastrointestinal endoscopy (2024); Hemorrhoid surgery (N/A, 2025); and Cystoscopy (N/A, 2025).     Allergies Allergies   Allergen Reactions    Latex Rash and Other (See Comments)    Other Hives     Patient stated surgical

## 2025-06-18 ENCOUNTER — HOSPITAL ENCOUNTER (OUTPATIENT)
Dept: PHYSICAL THERAPY | Age: 45
Setting detail: THERAPIES SERIES
Discharge: HOME | End: 2025-06-18
Payer: COMMERCIAL

## 2025-06-18 PROCEDURE — 97140 MANUAL THERAPY 1/> REGIONS: CPT

## 2025-06-18 PROCEDURE — 97110 THERAPEUTIC EXERCISES: CPT

## 2025-06-18 NOTE — PROGRESS NOTES
Physical Therapy    [facility]  PHYSICAL THERAPY  [] EVALUATION  [x] DAILY NOTE (LAND) [] DAILY NOTE (AQUATIC ) [] PROGRESS NOTE [] DISCHARGE NOTE    [x] OUTPATIENT REHABILITATION CENTER [facility]   [] [facility] CARE [place]    [] [facility]   [] [facility]    Date: 2025  Patient Name:  Chelsey Juarez  : 1980  MRN: [id number]  CSN: [id number]    Referring Practitioner Darshan Suazo APRN - CNP [phone]      Diagnosis  Diagnoses       M89.8X7 (ICD-10-CM) - Other specified disorders of bone, ankle and foot    M76.72 (ICD-10-CM) - Peroneal tendinitis, left leg    M19.072 (ICD-10-CM) - Primary osteoarthritis, left ankle and foot    M25.372 (ICD-10-CM) - Other instability, left ankle    Z47.89 (ICD-10-CM) - Encounter for other orthopedic aftercare           Treatment Diagnosis M25.572 Pain in left ankle  M25.672 Stiffness of left ankle, not elsewhere classified  M62.81 Generalized Muscle Weakness  R26.2 Difficulty in walking  Z47.89 Encounter for orthopedic aftercare   Date of Evaluation 25   Additional Pertinent History Chelsey Juarez has a past medical history of Airway obstruction, Back pain, Cystitis, interstitial, Esophageal ulcer, GERD (gastroesophageal reflux disease), Headache(784.0), Hyperlipidemia, Insulin resistance, Irritable bowel syndrome, Spondylolisthesis, and Spondylolysis.  she has a past surgical history that includes cyst removal (2011); Hysterectomy (); Abdominal adhesion surgery (2012); Colonoscopy (, ); Tonsillectomy and adenoidectomy (1987); [place] tooth extraction (1996); Ovary removal (); Shoulder arthroscopy (Left, 2023); Ankle surgery (Right, ); Upper gastrointestinal endoscopy (2024); Hemorrhoid surgery (N/A, 2025); and Cystoscopy (N/A, 2025).     Allergies Allergies   Allergen Reactions    Latex Rash and Other (See Comments)    Other Hives     Patient stated surgical

## 2025-06-19 ENCOUNTER — OFFICE VISIT (OUTPATIENT)
Age: 45
End: 2025-06-19
Payer: COMMERCIAL

## 2025-06-19 VITALS
WEIGHT: 211.38 LBS | DIASTOLIC BLOOD PRESSURE: 84 MMHG | HEART RATE: 103 BPM | BODY MASS INDEX: 39.91 KG/M2 | SYSTOLIC BLOOD PRESSURE: 138 MMHG | HEIGHT: 61 IN

## 2025-06-19 DIAGNOSIS — R53.83 OTHER FATIGUE: ICD-10-CM

## 2025-06-19 DIAGNOSIS — R79.89 LOW SERUM CORTISOL LEVEL: Primary | ICD-10-CM

## 2025-06-19 PROCEDURE — G8427 DOCREV CUR MEDS BY ELIG CLIN: HCPCS | Performed by: INTERNAL MEDICINE

## 2025-06-19 PROCEDURE — 99204 OFFICE O/P NEW MOD 45 MIN: CPT | Performed by: INTERNAL MEDICINE

## 2025-06-19 PROCEDURE — G8417 CALC BMI ABV UP PARAM F/U: HCPCS | Performed by: INTERNAL MEDICINE

## 2025-06-19 PROCEDURE — 4004F PT TOBACCO SCREEN RCVD TLK: CPT | Performed by: INTERNAL MEDICINE

## 2025-06-19 RX ORDER — CEFADROXIL 500 MG/1
CAPSULE ORAL
COMMUNITY
Start: 2025-06-17

## 2025-06-19 RX ORDER — ALPRAZOLAM 0.5 MG
0.5 TABLET ORAL EVERY 8 HOURS PRN
COMMUNITY
Start: 2025-05-28

## 2025-06-19 NOTE — PROGRESS NOTES
by mouth every 8 hours as needed      cyclobenzaprine (FLEXERIL) 10 MG tablet Take 1 tablet by mouth 3 times daily as needed for Muscle spasms      lidocaine (LIDODERM) 5 %       pantoprazole (PROTONIX) 40 MG tablet Take 1 tablet by mouth 2 times daily (before meals)      albuterol sulfate HFA (VENTOLIN HFA) 108 (90 Base) MCG/ACT inhaler Inhale 2 puffs into the lungs every 6 hours as needed for Wheezing      albuterol (PROVENTIL) (2.5 MG/3ML) 0.083% nebulizer solution Take 3 mLs by nebulization every 6 hours as needed for Wheezing or Shortness of Breath 120 each 0    topiramate (TOPAMAX) 100 MG tablet Take 1 tablet by mouth daily (Patient not taking: Reported on 6/19/2025)      fluticasone-umeclidin-vilant (TRELEGY ELLIPTA) 200-62.5-25 MCG/ACT AEPB inhaler Inhale 1 puff into the lungs daily (Patient not taking: Reported on 6/19/2025) 1 each 11     No current facility-administered medications for this visit.     Allergies   Allergen Reactions    Latex Rash and Other (See Comments)    Other Hives     Patient stated surgical glue -hives     Doxycycline Hives and Rash     Health Maintenance   Topic Date Due    Depression Screen  Never done    Varicella vaccine (1 of 2 - 13+ 2-dose series) Never done    HIV screen  Never done    Hepatitis C screen  Never done    Hepatitis B vaccine (1 of 3 - 19+ 3-dose series) Never done    DTaP/Tdap/Td vaccine (1 - Tdap) Never done    Pneumococcal 0-49 years Vaccine (1 of 2 - PCV) Never done    Lipids  Never done    COVID-19 Vaccine (1 - 2024-25 season) Never done    Flu vaccine (Season Ended) 08/01/2025    Breast cancer screen  10/17/2026    Hepatitis A vaccine  Aged Out    Hib vaccine  Aged Out    HPV vaccine  Aged Out    Polio vaccine  Aged Out    Meningococcal (ACWY) vaccine  Aged Out    Meningococcal B vaccine  Aged Out       Labs  No results found for: \"LABA1C\"  No results found for: \"EAG\"  Lab Results   Component Value Date    TSH 0.790 09/26/2022     No results found for:

## 2025-06-20 DIAGNOSIS — R79.89 LOW SERUM CORTISOL LEVEL: Primary | ICD-10-CM

## 2025-06-20 RX ORDER — COSYNTROPIN 0.25 MG/ML
250 INJECTION, POWDER, FOR SOLUTION INTRAMUSCULAR; INTRAVENOUS ONCE
OUTPATIENT
Start: 2025-06-27 | End: 2025-06-27

## 2025-06-20 RX ORDER — SODIUM CHLORIDE 0.9 % (FLUSH) 0.9 %
5-40 SYRINGE (ML) INJECTION PRN
OUTPATIENT
Start: 2025-06-27

## 2025-06-25 ENCOUNTER — HOSPITAL ENCOUNTER (OUTPATIENT)
Dept: PHYSICAL THERAPY | Age: 45
Setting detail: THERAPIES SERIES
Discharge: HOME | End: 2025-06-25
Payer: COMMERCIAL

## 2025-06-25 PROCEDURE — 97110 THERAPEUTIC EXERCISES: CPT

## 2025-06-25 NOTE — PROGRESS NOTES
made, able to attempt but limited to 1 sec max.  Continue Goal  Patient will ambulate with good heel/toe pattern without antalgia for safe community mobility. GOAL MET:  wearing ASO brace demonstrates heel strike gait pattern.  Discontinue Goal  Patient will be independent and compliant with HEP to achieve above goals. GOAL MET:  performing HEP consistently, but needs progressions.  Continue Goal  Patient will report 4/10 pain to tolerate standing and walking longer durations. GOAL NOT MET: pain increased to 8/10 during SLS attempted.  Continue Goal  NEW GOAL 6/25/25: Patient will improve score of FAAM from baseline 33/84 to at least 43/84 in order to stand and walk longer durations.     Patient Education:   [x]  HEP/Education Completed: Advised to add standing gastroc stretch and progress to ankle theraband, isometric strength for eversion  [employer] Access Code: [email or web address]   []  No new Education completed  []  Reviewed Prior HEP      [x]  Patient verbalized and/or demonstrated understanding of education provided.  []  Patient unable to verbalize and/or demonstrate understanding of education provided.  Will continue education.  []  Barriers to learning:     PLAN:  Treatment Recommendations: Strengthening, Range of Motion, Balance Training, Functional Mobility Training, Transfer Training, Endurance Training, Gait Training, Stair Training, Neuromuscular Re-education, Manual Therapy - Soft Tissue Mobilization, Pain Management, Home Exercise Program, Safety Education and Training, Self-Care Education and Training, Modalities, Neuropathy Management, and Body Mechanics/Ergonomic Training    []  Plan of care initiated.  Plan to see patient 2 times per week for 12 weeks to address the treatment planned outlined above.  []  Continue with current plan of care  [x]  Modify plan of care as follows: extending plan of care 8 additional weeks  []  Hold pending physician visit  []  Discharge    Time In 845   Time Out 920   Timed

## 2025-06-30 NOTE — PROGRESS NOTES
[facility]    Patient: TEMO CARDENAS, 45 y.o.   : 1980    Patient of Dr. Cortez    Assessment/Plan     Assessment & Plan  1. Asthma: Chronic.  - Symptoms include shortness of breath, wheezing, and cough with white phlegm production. Not on Trelegy inhaler since 2025 due to concerns about adrenal insufficiency. Currently using short-acting albuterol as needed, but this is insufficient for long-term management. Discussed this with patient and advised ICS. Patient declines. She states she prefers to take [place] supplement instead  - Prescriptions for albuterol, nebulizer solution, and tubing provided to the pharmacy.  - Continue singulair  - Repeat spirometry test prior to next visit in 3 months.    2. Concern for Adrenal insufficiency:   - Not taken any steroids since 2025, including Trelegy inhaler. Cortisol level was 4.7 at 7:30 in the morning.  - Follow up with endocrinologist for further management.  - Discuss parameters for adrenal crisis management, especially concerning upcoming surgeries.    3. Cigarette nicotine dependence  - Currently smoking a couple of cigarettes a day and using nicotine patches prescribed by family doctor.  - Advised to avoid smoking while using the patch to prevent nicotine overdose.  - Encouraged to continue reducing cigarette use and to reach out for a refill on the patches if needed.  - Tobacco Use Counseling: Patient was counseled on tobacco cessation. Based upon patient's motivation to change her behavior, the following plan was agreed upon: gradual reduction of tobacco use and willpower. Educational materials regarding tobacco cessation were provided. I spent 3 minutes counseling patient.     Follow-up  - A follow-up visit is scheduled in 3 months.       Reviewed preventative vaccinations    Advised patient to call office with any changes, questions, or concerns regarding respiratory status or issues with

## 2025-07-01 ENCOUNTER — OFFICE VISIT (OUTPATIENT)
Dept: PULMONOLOGY | Age: 45
End: 2025-07-01
Payer: COMMERCIAL

## 2025-07-01 VITALS
DIASTOLIC BLOOD PRESSURE: 80 MMHG | TEMPERATURE: 98.9 F | HEART RATE: 79 BPM | SYSTOLIC BLOOD PRESSURE: 122 MMHG | OXYGEN SATURATION: 97 % | BODY MASS INDEX: 39.04 KG/M2 | HEIGHT: 61 IN | WEIGHT: 206.8 LBS

## 2025-07-01 DIAGNOSIS — J45.40 MODERATE PERSISTENT ASTHMA WITHOUT COMPLICATION: Primary | ICD-10-CM

## 2025-07-01 DIAGNOSIS — F17.210 CIGARETTE NICOTINE DEPENDENCE WITHOUT COMPLICATION: ICD-10-CM

## 2025-07-01 PROCEDURE — 99406 BEHAV CHNG SMOKING 3-10 MIN: CPT

## 2025-07-01 PROCEDURE — G8427 DOCREV CUR MEDS BY ELIG CLIN: HCPCS

## 2025-07-01 PROCEDURE — G8417 CALC BMI ABV UP PARAM F/U: HCPCS

## 2025-07-01 PROCEDURE — 4004F PT TOBACCO SCREEN RCVD TLK: CPT

## 2025-07-01 PROCEDURE — 99214 OFFICE O/P EST MOD 30 MIN: CPT

## 2025-07-01 RX ORDER — ALBUTEROL SULFATE 90 UG/1
2 INHALANT RESPIRATORY (INHALATION) EVERY 6 HOURS PRN
Qty: 18 G | Refills: 11 | Status: SHIPPED | OUTPATIENT
Start: 2025-07-01

## 2025-07-01 RX ORDER — ALBUTEROL SULFATE 0.83 MG/ML
2.5 SOLUTION RESPIRATORY (INHALATION) EVERY 6 HOURS PRN
Qty: 120 EACH | Refills: 11 | Status: SHIPPED | OUTPATIENT
Start: 2025-07-01 | End: 2026-07-01

## 2025-07-01 RX ORDER — NEBULIZER ACCESSORIES
1 KIT MISCELLANEOUS EVERY 6 HOURS PRN
Qty: 1 KIT | Refills: 1 | Status: SHIPPED | OUTPATIENT
Start: 2025-07-01 | End: 2026-07-01

## 2025-07-01 NOTE — PATIENT INSTRUCTIONS
body and clothing.  ?To keep cockroaches away, don't let garbage or dirty dishes pile up. If possible, fix leaky plumbing so there are no puddles of water. If you have cockroaches, use traps to kill them, or call an .  ?To get rid of mice, set traps or call an .  What if I can't avoid my triggers?  If you can't avoid your triggers, talk with your doctor or nurse about what you can do.  Exercise is an example of a trigger that you should not avoid, because exercise keeps you healthy. To prevent asthma symptoms when you exercise:  ?Take an extra dose of your quick-relief inhaler medicine before you exercise.  ?Warm up slowly before each exercise session.  ?Avoid exercising outdoors if it is very cold out.    GRAPHICS  Tips for avoiding dust mites in your home  First: Bedrooms   Cover your pillows and mattresses with special zippered covers that keep dust mites away from you.  Every week, wash your sheets, pillowcases, and blankets in hot water with detergent or dry them in an dryer on the hot setting. If possible, use a comforter or blanket that can be washed.  Use washable, vinyl, or roll-type window covers. Avoid curtains and drapes.  Remove clutter, stuffed animals and other soft toys, bed skirts, and fabric-covered furniture. If you cannot take the stuffed animals out of the bedroom, wash them every week.  If possible, remove carpets. Instead, use area rugs that can be cleaned or washed.   Second: Rest of house   Try to reduce the amount of fabric-covered furniture you have, especially old sofas.  If possible, replace carpets with (non-carpet) jani. This includes carpets on concrete slabs or over poorly ventilated crawl spaces.  Every week, vacuum the house with a vacuum that has a \"HEPA filter.\" Use a mop, rather than a broom, to clean floors.  Use washable, vinyl, or roll-type window covers.  Make sure that the humidity in the home is less than 50%. Do not use a humidifier.   Third:

## 2025-07-10 ENCOUNTER — HOSPITAL ENCOUNTER (OUTPATIENT)
Dept: PHYSICAL THERAPY | Age: 45
Setting detail: THERAPIES SERIES
Discharge: HOME | End: 2025-07-10
Payer: COMMERCIAL

## 2025-07-10 ENCOUNTER — TELEPHONE (OUTPATIENT)
Dept: PULMONOLOGY | Age: 45
End: 2025-07-10

## 2025-07-10 DIAGNOSIS — J45.40 MODERATE PERSISTENT ASTHMA WITHOUT COMPLICATION: ICD-10-CM

## 2025-07-10 PROCEDURE — 97161 PT EVAL LOW COMPLEX 20 MIN: CPT

## 2025-07-10 RX ORDER — NEBULIZER ACCESSORIES
1 KIT MISCELLANEOUS EVERY 6 HOURS PRN
Qty: 1 KIT | Refills: 1 | Status: SHIPPED | OUTPATIENT
Start: 2025-07-10 | End: 2026-07-10

## 2025-07-10 NOTE — PROGRESS NOTES
[facility]  PHYSICAL THERAPY  [x] EVALUATION  [] DAILY NOTE (LAND) [] DAILY NOTE (AQUATIC ) [] PROGRESS NOTE [] DISCHARGE NOTE    [x] OUTPATIENT REHABILITATION CENTER [facility]   [] [place] [facility]ATORY CARE [place]    [] [facility]   [] [facility]    Date: 7/10/2025  Patient Name:  Chelsey Juarez  : 1980  MRN: [id number]  CSN: [id number]    Referring Practitioner Analisa Alfonso, APRN - CNP [phone]      Diagnosis  Diagnoses       M54.2 (ICD-10-CM) - Cervicalgia    M54.12 (ICD-10-CM) - Cervical radiculitis    G95.9 (ICD-10-CM) - Disease of spinal cord, unspecified (HCC)           Treatment Diagnosis  M54.2, G89.29  Chronic Neck Pain  M62.81 Generalized Muscle Weakness  M54.6,G89.29  Chronic Thoracic Back Pain  R29.3 Abnormal Posture   Date of Evaluation 7/10/25   Additional Pertinent History Chelsey Juarez has a past medical history of Airway obstruction, Back pain, Cystitis, interstitial, Esophageal ulcer, GERD (gastroesophageal reflux disease), Headache(784.0), Hyperlipidemia, Insulin resistance, Irritable bowel syndrome, Spondylolisthesis, and Spondylolysis.  she has a past surgical history that includes cyst removal (2011); Hysterectomy (); Abdominal adhesion surgery (2012); Colonoscopy (, ); Tonsillectomy and adenoidectomy (1987); [place] tooth extraction (1996); Ovary removal (); Shoulder arthroscopy (Left, 2023); Ankle surgery (Right, ); Upper gastrointestinal endoscopy (2024); Hemorrhoid surgery (N/A, 2025); and Cystoscopy (N/A, 2025).     Allergies Allergies   Allergen Reactions    Latex Rash and Other (See Comments)    Other Hives     Patient stated surgical glue -hives     Doxycycline Hives and Rash      Medications   Current Outpatient Medications:     albuterol sulfate HFA (VENTOLIN HFA) 108 (90 Base) MCG/ACT inhaler, Inhale 2 puffs into the lungs every 6 hours as needed for Wheezing or Shortness

## 2025-07-10 NOTE — TELEPHONE ENCOUNTER
Chelsey called in. Her nebulizer supply went to [employer] and they do not carry it. I need a new order for nebulizer supplies so I can fax to [facility].Thank you

## 2025-07-11 ENCOUNTER — HOSPITAL ENCOUNTER (OUTPATIENT)
Dept: PHYSICAL THERAPY | Age: 45
Setting detail: THERAPIES SERIES
Discharge: HOME | End: 2025-07-11
Payer: COMMERCIAL

## 2025-07-11 PROCEDURE — 97140 MANUAL THERAPY 1/> REGIONS: CPT

## 2025-07-11 PROCEDURE — 97110 THERAPEUTIC EXERCISES: CPT

## 2025-07-11 NOTE — PROGRESS NOTES
Physical Therapy    [facility]  PHYSICAL THERAPY  [] EVALUATION  [x] DAILY NOTE (LAND) [] DAILY NOTE (AQUATIC ) [] PROGRESS NOTE [] DISCHARGE NOTE    [x] OUTPATIENT REHABILITATION CENTER [facility]   [] [facility] CARE [place]    [] [facility]   [] [facility]    Date: 2025  Patient Name:  Chelsey Juarez  : 1980  MRN: [id number]  CSN: [id number]    Referring Practitioner Darshan Suazo APRN - CNP [id number]      Diagnosis  Diagnoses       M89.8X7 (ICD-10-CM) - Other specified disorders of bone, ankle and foot    M76.72 (ICD-10-CM) - Peroneal tendinitis, left leg    M19.072 (ICD-10-CM) - Primary osteoarthritis, left ankle and foot    M25.372 (ICD-10-CM) - Other instability, left ankle    Z47.89 (ICD-10-CM) - Encounter for other orthopedic aftercare           Treatment Diagnosis M25.572 Pain in left ankle  M25.672 Stiffness of left ankle, not elsewhere classified  M62.81 Generalized Muscle Weakness  R26.2 Difficulty in walking  Z47.89 Encounter for orthopedic aftercare   Date of Evaluation 25   Additional Pertinent History Chelsey Juarez has a past medical history of Airway obstruction, Back pain, Cystitis, interstitial, Esophageal ulcer, GERD (gastroesophageal reflux disease), Headache(784.0), Hyperlipidemia, Insulin resistance, Irritable bowel syndrome, Spondylolisthesis, and Spondylolysis.  she has a past surgical history that includes cyst removal (2011); Hysterectomy (); Abdominal adhesion surgery (2012); Colonoscopy (, ); Tonsillectomy and adenoidectomy (1987); [place] tooth extraction (1996); Ovary removal (); Shoulder arthroscopy (Left, 2023); Ankle surgery (Right, ); Upper gastrointestinal endoscopy (2024); Hemorrhoid surgery (N/A, 2025); and Cystoscopy (N/A, 2025).     Allergies Allergies   Allergen Reactions    Latex Rash and Other (See Comments)    Other Hives     Patient stated surgical

## 2025-07-14 ENCOUNTER — HOSPITAL ENCOUNTER (OUTPATIENT)
Dept: PHYSICAL THERAPY | Age: 45
Setting detail: THERAPIES SERIES
Discharge: HOME | End: 2025-07-14
Payer: COMMERCIAL

## 2025-07-14 PROCEDURE — 97110 THERAPEUTIC EXERCISES: CPT

## 2025-07-14 PROCEDURE — 97140 MANUAL THERAPY 1/> REGIONS: CPT

## 2025-07-15 ENCOUNTER — HOSPITAL ENCOUNTER (OUTPATIENT)
Dept: NURSING | Age: 45
Setting detail: INFUSION SERIES
Discharge: HOME | End: 2025-07-15
Payer: COMMERCIAL

## 2025-07-15 ENCOUNTER — HOSPITAL ENCOUNTER (OUTPATIENT)
Age: 45
Discharge: HOME | End: 2025-07-15
Payer: COMMERCIAL

## 2025-07-15 VITALS
DIASTOLIC BLOOD PRESSURE: 80 MMHG | HEART RATE: 82 BPM | TEMPERATURE: 98.2 F | RESPIRATION RATE: 16 BRPM | OXYGEN SATURATION: 97 % | SYSTOLIC BLOOD PRESSURE: 138 MMHG

## 2025-07-15 DIAGNOSIS — R79.89 LOW SERUM CORTISOL LEVEL: Primary | ICD-10-CM

## 2025-07-15 DIAGNOSIS — R53.83 OTHER FATIGUE: ICD-10-CM

## 2025-07-15 LAB
CORTIS SERPL-MCNC: 18.2 UG/DL
CORTIS SERPL-MCNC: 22.3 UG/DL
CORTIS SERPL-MCNC: 9.7 UG/DL
CORTISOL COLLECTION INFO: NORMAL
T4 FREE SERPL-MCNC: 0.8 NG/DL (ref 0.92–1.68)
TSH SERPL DL<=0.05 MIU/L-ACNC: 1.83 UIU/ML (ref 0.27–4.2)

## 2025-07-15 PROCEDURE — 36415 COLL VENOUS BLD VENIPUNCTURE: CPT

## 2025-07-15 PROCEDURE — 6360000002 HC RX W HCPCS

## 2025-07-15 PROCEDURE — 84443 ASSAY THYROID STIM HORMONE: CPT

## 2025-07-15 PROCEDURE — 82024 ASSAY OF ACTH: CPT

## 2025-07-15 PROCEDURE — 96374 THER/PROPH/DIAG INJ IV PUSH: CPT

## 2025-07-15 PROCEDURE — 2500000003 HC RX 250 WO HCPCS

## 2025-07-15 PROCEDURE — 82533 TOTAL CORTISOL: CPT

## 2025-07-15 PROCEDURE — 84439 ASSAY OF FREE THYROXINE: CPT

## 2025-07-15 PROCEDURE — 99213 OFFICE O/P EST LOW 20 MIN: CPT

## 2025-07-15 RX ORDER — COSYNTROPIN 0.25 MG/ML
250 INJECTION, POWDER, FOR SOLUTION INTRAMUSCULAR; INTRAVENOUS ONCE
Status: COMPLETED | OUTPATIENT
Start: 2025-07-15 | End: 2025-07-15

## 2025-07-15 RX ORDER — SODIUM CHLORIDE 0.9 % (FLUSH) 0.9 %
5-40 SYRINGE (ML) INJECTION PRN
Status: DISCONTINUED | OUTPATIENT
Start: 2025-07-15 | End: 2025-07-15

## 2025-07-15 RX ORDER — SODIUM CHLORIDE 0.9 % (FLUSH) 0.9 %
5-40 SYRINGE (ML) INJECTION PRN
Status: CANCELLED | OUTPATIENT
Start: 2025-07-15

## 2025-07-15 RX ORDER — COSYNTROPIN 0.25 MG/ML
250 INJECTION, POWDER, FOR SOLUTION INTRAMUSCULAR; INTRAVENOUS ONCE
Status: CANCELLED | OUTPATIENT
Start: 2025-07-15 | End: 2025-07-15

## 2025-07-15 RX ADMIN — SODIUM CHLORIDE, PRESERVATIVE FREE 10 ML: 5 INJECTION INTRAVENOUS at 08:00

## 2025-07-15 RX ADMIN — COSYNTROPIN 250 MCG: 0.25 INJECTION, POWDER, LYOPHILIZED, FOR SOLUTION INTRAMUSCULAR; INTRAVENOUS at 08:00

## 2025-07-15 ASSESSMENT — PAIN DESCRIPTION - DESCRIPTORS: DESCRIPTORS: ACHING

## 2025-07-15 ASSESSMENT — PAIN - FUNCTIONAL ASSESSMENT: PAIN_FUNCTIONAL_ASSESSMENT: 0-10

## 2025-07-15 NOTE — PROGRESS NOTES
[facility]  Therapy Contact Note      Date: 7/15/2025  Patient Name: Chelsey Juarez        MRN: [id number]    Account Number: [id number]  : 1980  (45 y.o.)  Gender: female         Spoke to patient regarding if she needed [employer]. Currently her appointments are set up where she can borrow a car. If something changes, patient will contact this Rehab Tech.     Priya Dixon, Rehab Tech   
  Patient reports difficulty with plantar flexion and ankle eversion.  Patient reports she continues with pain up to 10/10 especially in the morning.  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      SUBJECTIVE: Patient states the ASTYM does seem to help. Reports she is feeling a little nauseous and lightheaded today but that may be related to stress with other things going on.     Objective:  TREATMENT   Precautions: NO US over incisions.    Pain: 5-6/10 Left lateral foot and achilles    \"X” in shaded column indicates activity completed today    “*\" next to exercise/intervention indicates progression   Modalities Parameters/  Location  Notes                     Manual Therapy Time/Technique  Notes   Scar tissue massage 3 min X    Astym to L lower leg (including knee) 13 min X Soft tissue restrictions present at: 1st MTP jt, b/w metatarsal heads, abductor hallucis, medial/lateral malleolus, ankle mortise, peroneals, calf/soleus, knee, HS/gastroc tendons, achilles, plantar fascia   Had to decrease harp/pressure at medial ankle and medial LE this session due to pain    STM to Left gastroc, achilles and plantar fascia  15 min           Total manual  16 min  X    Exercise/Intervention   Notes   Nu step S7, A 7 6 min  L3 X           Seated:       Toe splaying 10x      Toe flexion/extension 10x      Toe crunches x10      Gastroc stretch with towel  3x20s      Soleus stretch with towel  3x20s      Ankle circles 12x ea      Ankle DF/PF, Inv/Ev 10x ea   Add theraband next time    ABC 1x  X           Standing:        Standing HR/TR  10x  X    Step stance: anterior/posterior weight shifting  10 ea       Anglican pew  5x3s   Cues for technique   Standing gastroc stretch and soleus 3x20s each   Cues to slightly flex knee for soleus   Gastroc/soleus stretch at wall with ball of foot on towel roll* 3x20s each  X             Specific Interventions

## 2025-07-15 NOTE — PROGRESS NOTES
0737 Patient arrived to [facility] ambulatory for stimulation test.  Oriented to room and call light  PT RIGHTS AND RESPONSIBILITIES OFFERED TO PT.    0749 baseline blood work obtained and sent to lab    0800 Cortrosyn given and she denies complaints     0830 - 30 min post blood work obtained and sent to lab    0900 - 60 min post blood work obtained and sent to lab     0912 Iv removed.  Discharge instructions given and explained and she denies questions.  Discharged ambulatory       _M___ Safety:       (Environmental)  [place] to environment  Ensure ID band is correct and in place/ allergy band as needed  Assess for fall risk  Initiate fall precautions as applicable (fall band, side rails, etc.)  Call light within reach  Bed in low position/ wheels locked    _M___ Pain:       Assess pain level and characteristics  Administer analgesics as ordered  Assess effectiveness of pain management and report to MD as needed    _M___ Knowledge Deficit:  Assess baseline knowledge  Provide teaching at level of understanding  Provide teaching via preferred learning method  Evaluate teaching effectiveness    _M___ Hemodynamic/Respiratory Status:       (Pre and Post Procedure Monitoring)  Assess/Monitor vital signs and LOC  Assess Baseline SpO2 prior to any sedation  Obtain weight/height  Assess vital signs/ LOC until patient meets discharge criteria  Monitor procedure site and notify MD of any issues

## 2025-07-17 LAB — ACTH PLAS-MCNC: 22.3 PG/ML (ref 7.2–63.3)

## 2025-07-18 ENCOUNTER — HOSPITAL ENCOUNTER (OUTPATIENT)
Dept: PHYSICAL THERAPY | Age: 45
Setting detail: THERAPIES SERIES
Discharge: HOME | End: 2025-07-18
Payer: COMMERCIAL

## 2025-07-18 PROCEDURE — 97140 MANUAL THERAPY 1/> REGIONS: CPT

## 2025-07-18 PROCEDURE — 97110 THERAPEUTIC EXERCISES: CPT

## 2025-07-18 NOTE — PROGRESS NOTES
Physical Therapy    [facility]  PHYSICAL THERAPY  [] EVALUATION  [x] DAILY NOTE (LAND) [] DAILY NOTE (AQUATIC ) [] PROGRESS NOTE [] DISCHARGE NOTE    [x] OUTPATIENT REHABILITATION CENTER [facility]   [] [facility] CARE [place]    [] [facility]   [] [facility]    Date: 2025  Patient Name:  Chelsey Juarez  : 1980  MRN: [id number]  CSN: [id number]    Referring Practitioner Darshan Suazo APRN - CNP [id number]      Diagnosis  Diagnoses       M89.8X7 (ICD-10-CM) - Other specified disorders of bone, ankle and foot    M76.72 (ICD-10-CM) - Peroneal tendinitis, left leg    M19.072 (ICD-10-CM) - Primary osteoarthritis, left ankle and foot    M25.372 (ICD-10-CM) - Other instability, left ankle    Z47.89 (ICD-10-CM) - Encounter for other orthopedic aftercare           Treatment Diagnosis M25.572 Pain in left ankle  M25.672 Stiffness of left ankle, not elsewhere classified  M62.81 Generalized Muscle Weakness  R26.2 Difficulty in walking  Z47.89 Encounter for orthopedic aftercare   Date of Evaluation 25   Additional Pertinent History Chelsey Juarez has a past medical history of Airway obstruction, Back pain, Cystitis, interstitial, Esophageal ulcer, GERD (gastroesophageal reflux disease), Headache(784.0), Hyperlipidemia, Insulin resistance, Irritable bowel syndrome, Spondylolisthesis, and Spondylolysis.  she has a past surgical history that includes cyst removal (2011); Hysterectomy (); Abdominal adhesion surgery (2012); Colonoscopy (, ); Tonsillectomy and adenoidectomy (1987); [place] tooth extraction (1996); Ovary removal (); Shoulder arthroscopy (Left, 2023); Ankle surgery (Right, ); Upper gastrointestinal endoscopy (2024); Hemorrhoid surgery (N/A, 2025); and Cystoscopy (N/A, 2025).     Allergies Allergies   Allergen Reactions    Latex Rash and Other (See Comments)    Other Hives     Patient stated surgical

## 2025-07-22 ENCOUNTER — CLINICAL DOCUMENTATION (OUTPATIENT)
Age: 45
End: 2025-07-22

## 2025-07-22 ENCOUNTER — TELEPHONE (OUTPATIENT)
Age: 45
End: 2025-07-22

## 2025-07-22 DIAGNOSIS — R94.6 ABNORMAL THYROID FUNCTION TEST: Primary | ICD-10-CM

## 2025-07-22 NOTE — PROGRESS NOTES
ACTH stimulation test is normal.  Free T4 is low.  To be repeated using equilibrium dialysis.  I spoke with the patient.  Please coordinate.

## 2025-07-22 NOTE — TELEPHONE ENCOUNTER
Patient called in asking about test results. She wants to know what the treatment plan was due to some levels being low. She is due to see pain management today and schedule more shots but wants to know what you would like to do. Please advise.

## 2025-07-23 ENCOUNTER — HOSPITAL ENCOUNTER (OUTPATIENT)
Age: 45
Discharge: HOME | End: 2025-07-23
Payer: COMMERCIAL

## 2025-07-23 ENCOUNTER — HOSPITAL ENCOUNTER (OUTPATIENT)
Dept: PHYSICAL THERAPY | Age: 45
Setting detail: THERAPIES SERIES
End: 2025-07-23
Payer: COMMERCIAL

## 2025-07-23 DIAGNOSIS — R94.6 ABNORMAL THYROID FUNCTION TEST: ICD-10-CM

## 2025-07-23 PROCEDURE — 36415 COLL VENOUS BLD VENIPUNCTURE: CPT

## 2025-07-23 PROCEDURE — 84439 ASSAY OF FREE THYROXINE: CPT

## 2025-07-25 ENCOUNTER — APPOINTMENT (OUTPATIENT)
Dept: PHYSICAL THERAPY | Age: 45
End: 2025-07-25
Payer: COMMERCIAL

## 2025-07-28 ENCOUNTER — HOSPITAL ENCOUNTER (OUTPATIENT)
Dept: PHYSICAL THERAPY | Age: 45
Setting detail: THERAPIES SERIES
Discharge: HOME | End: 2025-07-28
Payer: COMMERCIAL

## 2025-07-28 LAB — T4 FREE SERPL DIALY-MCNC: 1.6 NG/DL (ref 1.1–2.4)

## 2025-07-28 PROCEDURE — 97110 THERAPEUTIC EXERCISES: CPT

## 2025-07-28 PROCEDURE — 97140 MANUAL THERAPY 1/> REGIONS: CPT

## 2025-07-28 NOTE — PROGRESS NOTES
[facility]  PHYSICAL THERAPY  [] EVALUATION  [x] DAILY NOTE (LAND) [] DAILY NOTE (AQUATIC ) [] PROGRESS NOTE [] DISCHARGE NOTE    [x] OUTPATIENT REHABILITATION CENTER [place]   [] [place] AMBULATORY CARE [place]    [] [facility]   [] [facility]    Date: 2025  Patient Name:  Chelsey Juarez  : 1980  MRN: [id number]  CSN: [id number]    Referring Practitioner Analisa Alfonso, APRN - CNP [phone]      Diagnosis  Diagnoses       M54.2 (ICD-10-CM) - Cervicalgia    M54.12 (ICD-10-CM) - Cervical radiculitis    G95.9 (ICD-10-CM) - Disease of spinal cord, unspecified (HCC)           Treatment Diagnosis  M54.2, G89.29  Chronic Neck Pain  M62.81 Generalized Muscle Weakness  M54.6,G89.29  Chronic Thoracic Back Pain  R29.3 Abnormal Posture   Date of Evaluation 7/10/25   Additional Pertinent History Chelsey Juarez has a past medical history of Airway obstruction, Back pain, Cystitis, interstitial, Esophageal ulcer, GERD (gastroesophageal reflux disease), Headache(784.0), Hyperlipidemia, Insulin resistance, Irritable bowel syndrome, Spondylolisthesis, and Spondylolysis.  she has a past surgical history that includes cyst removal (2011); Hysterectomy (); Abdominal adhesion surgery (2012); Colonoscopy (, ); Tonsillectomy and adenoidectomy (1987); [place] tooth extraction (1996); Ovary removal (); Shoulder arthroscopy (Left, 2023); Ankle surgery (Right, ); Upper gastrointestinal endoscopy (2024); Hemorrhoid surgery (N/A, 2025); and Cystoscopy (N/A, 2025).     Allergies Allergies   Allergen Reactions    Latex Rash and Other (See Comments)    Other Hives     Patient stated surgical glue -hives     Doxycycline Hives and Rash      Medications   Current Outpatient Medications:     Respiratory Therapy Supplies (NEBULIZER/TUBING/MOUTHPIECE) KIT, 1 kit by Does not apply route every 6 hours as needed (shortness of breath)

## 2025-07-28 NOTE — PROGRESS NOTES
Physical Therapy    [facility]  PHYSICAL THERAPY  [] EVALUATION  [x] DAILY NOTE (LAND) [] DAILY NOTE (AQUATIC ) [] PROGRESS NOTE [] DISCHARGE NOTE    [x] OUTPATIENT REHABILITATION CENTER [facility]   [] [facility] CARE [place]    [] [facility]   [] [facility]    Date: 2025  Patient Name:  Chelsey Juarez  : 1980  MRN: [id number]  CSN: [id number]    Referring Practitioner Darshan Suazo APRN - CNP [id number]      Diagnosis  Diagnoses       M89.8X7 (ICD-10-CM) - Other specified disorders of bone, ankle and foot    M76.72 (ICD-10-CM) - Peroneal tendinitis, left leg    M19.072 (ICD-10-CM) - Primary osteoarthritis, left ankle and foot    M25.372 (ICD-10-CM) - Other instability, left ankle    Z47.89 (ICD-10-CM) - Encounter for other orthopedic aftercare           Treatment Diagnosis M25.572 Pain in left ankle  M25.672 Stiffness of left ankle, not elsewhere classified  M62.81 Generalized Muscle Weakness  R26.2 Difficulty in walking  Z47.89 Encounter for orthopedic aftercare   Date of Evaluation 25   Additional Pertinent History Chelsey Juarez has a past medical history of Airway obstruction, Back pain, Cystitis, interstitial, Esophageal ulcer, GERD (gastroesophageal reflux disease), Headache(784.0), Hyperlipidemia, Insulin resistance, Irritable bowel syndrome, Spondylolisthesis, and Spondylolysis.  she has a past surgical history that includes cyst removal (2011); Hysterectomy (); Abdominal adhesion surgery (2012); Colonoscopy (, ); Tonsillectomy and adenoidectomy (1987); [place] tooth extraction (1996); Ovary removal (); Shoulder arthroscopy (Left, 2023); Ankle surgery (Right, ); Upper gastrointestinal endoscopy (2024); Hemorrhoid surgery (N/A, 2025); and Cystoscopy (N/A, 2025).     Allergies Allergies   Allergen Reactions    Latex Rash and Other (See Comments)    Other Hives     Patient stated surgical

## 2025-08-01 ENCOUNTER — TRANSCRIBE ORDERS (OUTPATIENT)
Dept: SPEECH THERAPY | Age: 45
End: 2025-08-01

## 2025-08-01 ENCOUNTER — TELEPHONE (OUTPATIENT)
Dept: PULMONOLOGY | Age: 45
End: 2025-08-01

## 2025-08-01 ENCOUNTER — HOSPITAL ENCOUNTER (OUTPATIENT)
Dept: PHYSICAL THERAPY | Age: 45
Setting detail: THERAPIES SERIES
Discharge: HOME | End: 2025-08-01
Payer: COMMERCIAL

## 2025-08-01 DIAGNOSIS — R13.10 DYSPHAGIA, UNSPECIFIED TYPE: Primary | ICD-10-CM

## 2025-08-01 DIAGNOSIS — R05.8 PRODUCTIVE COUGH: ICD-10-CM

## 2025-08-01 DIAGNOSIS — R68.83 CHILLS: ICD-10-CM

## 2025-08-01 DIAGNOSIS — R52 BODY ACHES: Primary | ICD-10-CM

## 2025-08-01 PROCEDURE — 97110 THERAPEUTIC EXERCISES: CPT

## 2025-08-01 PROCEDURE — 97140 MANUAL THERAPY 1/> REGIONS: CPT

## 2025-08-01 PROCEDURE — 97112 NEUROMUSCULAR REEDUCATION: CPT

## 2025-08-01 NOTE — PROGRESS NOTES
[facility]  PHYSICAL THERAPY  [] EVALUATION  [x] DAILY NOTE (LAND) [] DAILY NOTE (AQUATIC ) [] PROGRESS NOTE [] DISCHARGE NOTE    [x] OUTPATIENT REHABILITATION CENTER [facility]   [] [place] AMBULATORY CARE [place]    [] [facility]   [] [facility]    Date: 2025  Patient Name:  Chelsey Juarez  : 1980  MRN: [id number]  CSN: [id number]    Referring Practitioner Analisa Alfonso, APRN - CNP [phone]      Diagnosis  Diagnoses       M54.2 (ICD-10-CM) - Cervicalgia    M54.12 (ICD-10-CM) - Cervical radiculitis    G95.9 (ICD-10-CM) - Disease of spinal cord, unspecified (HCC)           Treatment Diagnosis  M54.2, G89.29  Chronic Neck Pain  M62.81 Generalized Muscle Weakness  M54.6,G89.29  Chronic Thoracic Back Pain  R29.3 Abnormal Posture   Date of Evaluation 7/10/25   Additional Pertinent History Chelsey Juarez has a past medical history of Airway obstruction, Back pain, Cystitis, interstitial, Esophageal ulcer, GERD (gastroesophageal reflux disease), Headache(784.0), Hyperlipidemia, Insulin resistance, Irritable bowel syndrome, Spondylolisthesis, and Spondylolysis.  she has a past surgical history that includes cyst removal (2011); Hysterectomy (); Abdominal adhesion surgery (2012); Colonoscopy (, ); Tonsillectomy and adenoidectomy (1987); [place] tooth extraction (1996); Ovary removal (); Shoulder arthroscopy (Left, 2023); Ankle surgery (Right, ); Upper gastrointestinal endoscopy (2024); Hemorrhoid surgery (N/A, 2025); and Cystoscopy (N/A, 2025).     Allergies Allergies   Allergen Reactions    Latex Rash and Other (See Comments)    Other Hives     Patient stated surgical glue -hives     Doxycycline Hives and Rash      Medications   Current Outpatient Medications:     Respiratory Therapy Supplies (NEBULIZER/TUBING/MOUTHPIECE) KIT, 1 kit by Does not apply route every 6 hours as needed (shortness of breath)

## 2025-08-01 NOTE — PROGRESS NOTES
Physical Therapy    [facility]  PHYSICAL THERAPY  [] EVALUATION  [x] DAILY NOTE (LAND) [] DAILY NOTE (AQUATIC ) [] PROGRESS NOTE [] DISCHARGE NOTE    [x] OUTPATIENT REHABILITATION CENTER [facility]   [] [facility] CARE [place]    [] [facility]   [] [facility]    Date: 2025  Patient Name:  Chelsey Juarez  : 1980  MRN: [id number]  CSN: [id number]    Referring Practitioner Darshan Suazo APRN - CNP [id number]      Diagnosis  Diagnoses       M89.8X7 (ICD-10-CM) - Other specified disorders of bone, ankle and foot    M76.72 (ICD-10-CM) - Peroneal tendinitis, left leg    M19.072 (ICD-10-CM) - Primary osteoarthritis, left ankle and foot    M25.372 (ICD-10-CM) - Other instability, left ankle    Z47.89 (ICD-10-CM) - Encounter for other orthopedic aftercare           Treatment Diagnosis M25.572 Pain in left ankle  M25.672 Stiffness of left ankle, not elsewhere classified  M62.81 Generalized Muscle Weakness  R26.2 Difficulty in walking  Z47.89 Encounter for orthopedic aftercare   Date of Evaluation 25   Additional Pertinent History Chelsey Juarez has a past medical history of Airway obstruction, Back pain, Cystitis, interstitial, Esophageal ulcer, GERD (gastroesophageal reflux disease), Headache(784.0), Hyperlipidemia, Insulin resistance, Irritable bowel syndrome, Spondylolisthesis, and Spondylolysis.  she has a past surgical history that includes cyst removal (2011); Hysterectomy (); Abdominal adhesion surgery (2012); Colonoscopy (, ); Tonsillectomy and adenoidectomy (1987); [place] tooth extraction (1996); Ovary removal (); Shoulder arthroscopy (Left, 2023); Ankle surgery (Right, ); Upper gastrointestinal endoscopy (2024); Hemorrhoid surgery (N/A, 2025); and Cystoscopy (N/A, 2025).     Allergies Allergies   Allergen Reactions    Latex Rash and Other (See Comments)    Other Hives     Patient stated surgical

## 2025-08-04 ENCOUNTER — HOSPITAL ENCOUNTER (OUTPATIENT)
Dept: PHYSICAL THERAPY | Age: 45
Setting detail: THERAPIES SERIES
Discharge: HOME | End: 2025-08-04
Payer: COMMERCIAL

## 2025-08-04 PROCEDURE — 97140 MANUAL THERAPY 1/> REGIONS: CPT

## 2025-08-04 PROCEDURE — 97110 THERAPEUTIC EXERCISES: CPT

## 2025-08-07 ENCOUNTER — RESULTS FOLLOW-UP (OUTPATIENT)
Dept: PULMONOLOGY | Age: 45
End: 2025-08-07

## 2025-08-07 ENCOUNTER — HOSPITAL ENCOUNTER (OUTPATIENT)
Dept: GENERAL RADIOLOGY | Age: 45
Discharge: HOME | End: 2025-08-07
Payer: COMMERCIAL

## 2025-08-07 DIAGNOSIS — R52 BODY ACHES: ICD-10-CM

## 2025-08-07 DIAGNOSIS — R68.83 CHILLS: ICD-10-CM

## 2025-08-07 DIAGNOSIS — R05.8 PRODUCTIVE COUGH: ICD-10-CM

## 2025-08-07 PROCEDURE — 71046 X-RAY EXAM CHEST 2 VIEWS: CPT

## 2025-08-08 ENCOUNTER — HOSPITAL ENCOUNTER (OUTPATIENT)
Dept: PHYSICAL THERAPY | Age: 45
Setting detail: THERAPIES SERIES
Discharge: HOME | End: 2025-08-08
Payer: COMMERCIAL

## 2025-08-08 PROCEDURE — 97140 MANUAL THERAPY 1/> REGIONS: CPT

## 2025-08-08 PROCEDURE — 97110 THERAPEUTIC EXERCISES: CPT

## 2025-08-11 ENCOUNTER — HOSPITAL ENCOUNTER (OUTPATIENT)
Dept: PHYSICAL THERAPY | Age: 45
Setting detail: THERAPIES SERIES
Discharge: HOME | End: 2025-08-11
Payer: COMMERCIAL

## 2025-08-11 PROCEDURE — 97110 THERAPEUTIC EXERCISES: CPT

## 2025-08-11 PROCEDURE — 97140 MANUAL THERAPY 1/> REGIONS: CPT

## 2025-08-15 ENCOUNTER — HOSPITAL ENCOUNTER (OUTPATIENT)
Dept: PHYSICAL THERAPY | Age: 45
Setting detail: THERAPIES SERIES
Discharge: HOME | End: 2025-08-15
Payer: COMMERCIAL

## 2025-08-15 PROCEDURE — 97140 MANUAL THERAPY 1/> REGIONS: CPT

## 2025-08-15 PROCEDURE — 97110 THERAPEUTIC EXERCISES: CPT

## 2025-08-18 ENCOUNTER — APPOINTMENT (OUTPATIENT)
Dept: PHYSICAL THERAPY | Age: 45
End: 2025-08-18
Payer: COMMERCIAL

## 2025-08-18 ENCOUNTER — HOSPITAL ENCOUNTER (OUTPATIENT)
Dept: PHYSICAL THERAPY | Age: 45
Setting detail: THERAPIES SERIES
Discharge: HOME | End: 2025-08-18
Payer: COMMERCIAL

## 2025-08-18 PROCEDURE — 97140 MANUAL THERAPY 1/> REGIONS: CPT

## 2025-08-22 ENCOUNTER — HOSPITAL ENCOUNTER (OUTPATIENT)
Dept: PHYSICAL THERAPY | Age: 45
Setting detail: THERAPIES SERIES
Discharge: HOME | End: 2025-08-22
Payer: COMMERCIAL

## 2025-08-22 PROCEDURE — 97110 THERAPEUTIC EXERCISES: CPT

## 2025-08-22 PROCEDURE — 97140 MANUAL THERAPY 1/> REGIONS: CPT

## 2025-08-25 ENCOUNTER — HOSPITAL ENCOUNTER (OUTPATIENT)
Dept: PHYSICAL THERAPY | Age: 45
Setting detail: THERAPIES SERIES
Discharge: HOME | End: 2025-08-25
Payer: COMMERCIAL

## 2025-08-25 PROCEDURE — 97110 THERAPEUTIC EXERCISES: CPT

## 2025-08-29 ENCOUNTER — HOSPITAL ENCOUNTER (OUTPATIENT)
Dept: PHYSICAL THERAPY | Age: 45
Setting detail: THERAPIES SERIES
Discharge: HOME | End: 2025-08-29
Payer: COMMERCIAL

## 2025-08-29 PROCEDURE — 97110 THERAPEUTIC EXERCISES: CPT

## 2025-08-29 PROCEDURE — 97140 MANUAL THERAPY 1/> REGIONS: CPT

## 2025-09-05 ENCOUNTER — HOSPITAL ENCOUNTER (OUTPATIENT)
Dept: PHYSICAL THERAPY | Age: 45
Setting detail: THERAPIES SERIES
Discharge: HOME | End: 2025-09-05
Payer: COMMERCIAL

## 2025-09-05 PROCEDURE — 97110 THERAPEUTIC EXERCISES: CPT

## (undated) DEVICE — [RESECTOR CUTTER, ARTHROSCOPIC SHAVER BLADE,  DO NOT RESTERILIZE,  DO NOT USE IF PACKAGE IS DAMAGED,  KEEP DRY,  KEEP AWAY FROM SUNLIGHT]: Brand: FORMULA

## (undated) DEVICE — GOWN,SIRUS,NONRNF,SETINSLV,XL,20/CS: Brand: MEDLINE

## (undated) DEVICE — 1.2MM XBRAID TT, 100% UHMWPE, VIOLET CO-BRAID: Brand: XBRAID

## (undated) DEVICE — BREAST HERNIA: Brand: MEDLINE INDUSTRIES, INC.

## (undated) DEVICE — DISSECTOR SURG US 13 CM CRV JAW CRDLSS STRL SONICISION 7 LF

## (undated) DEVICE — PAD,ABDOMINAL,5"X9",ST,LF,25/BX: Brand: MEDLINE INDUSTRIES, INC.

## (undated) DEVICE — SUTURE VICRYL + SZ 0 L27IN ABSRB UD CT-1 L36MM 1/2 CIR TAPR VCP260H

## (undated) DEVICE — INTENDED FOR TISSUE SEPARATION, AND OTHER PROCEDURES THAT REQUIRE A SHARP SURGICAL BLADE TO PUNCTURE OR CUT.: Brand: BARD-PARKER ® CARBON RIB-BACK BLADES

## (undated) DEVICE — BASIC SINGLE BASIN BTC-LF: Brand: MEDLINE INDUSTRIES, INC.

## (undated) DEVICE — COVER,LIGHT HANDLE,FLX,2/PK: Brand: MEDLINE INDUSTRIES, INC.

## (undated) DEVICE — SUTURE CHROMIC GUT SZ 2-0 L27IN ABSRB BRN L26MM CT-2 1/2 883H

## (undated) DEVICE — MARKER,SKIN,WI/RULER AND LABELS: Brand: MEDLINE

## (undated) DEVICE — GLOVE ORANGE PI 7   MSG9070

## (undated) DEVICE — SOL IRR SOD CHL 0.9% TITAN XL CNTNR 3000ML

## (undated) DEVICE — CYSTO: Brand: MEDLINE INDUSTRIES, INC.

## (undated) DEVICE — SOLUTION IRRIG 3000ML 0.9% SOD CHL USP UROMATIC PLAS CONT

## (undated) DEVICE — [ARTHROSCOPY PUMP,  DO NOT USE IF PACKAGE IS DAMAGED,  KEEP DRY,  KEEP AWAY FROM SUNLIGHT,  PROTECT FROM HEAT AND RADIOACTIVE SOURCES.]: Brand: FLOSTEADY

## (undated) DEVICE — COUNTER NDL 40 COUNT HLD 70 FOAM BLK ADH W/ MAG

## (undated) DEVICE — GLOVE ORANGE PI 8   MSG9080

## (undated) DEVICE — 90-S CRUISE, SUCTION PROBE, NON-BENDABLE, MAX CUT LEVEL 1: Brand: SERFAS ENERGY

## (undated) DEVICE — CATHETER,FOLEY,100%SILICONE,16FR,10ML,LF: Brand: MEDLINE

## (undated) DEVICE — SUTURE PROL SZ 3-0 L18IN NONABSORBABLE BLU L30MM FS-1 3/8 8663G

## (undated) DEVICE — PREMIUM DRY TRAY LF: Brand: MEDLINE INDUSTRIES, INC.

## (undated) DEVICE — SURGIFOAM SPNG SZ 100

## (undated) DEVICE — CONTAINER,SPECIMEN,PNEU TUBE,4OZ,OR STRL: Brand: MEDLINE

## (undated) DEVICE — APPLICATOR MEDICATED 26 CC SOLUTION HI LT ORNG CHLORAPREP

## (undated) DEVICE — SLIDER SUT NDL NIT SHUTTLE CHAMPION

## (undated) DEVICE — Device

## (undated) DEVICE — SPONGE GZ W4XL4IN COT 12 PLY TYP VII WVN C FLD DSGN STERILE

## (undated) DEVICE — LUER-LOK 360°: Brand: CONNECTA, LUER-LOK

## (undated) DEVICE — T-MAX DISPOSABLE FACE MASK 8 PER BOX

## (undated) DEVICE — SOLUTION SCRB 4OZ 7.5% POVIDONE IOD ANTIMIC BTL

## (undated) DEVICE — GLOVE SURG SZ 75 L12IN THK75MIL DK GRN LTX FREE

## (undated) DEVICE — SLING ARM M 28-33CM BLK MESH FAB EZ OPN FR PNL W/

## (undated) DEVICE — PACK-MAJOR

## (undated) DEVICE — GLOVE ORANGE PI 7 1/2   MSG9075

## (undated) DEVICE — DISPOSABLE MULTI BAG ADAPTERS Y                                    TUBING, STERILE, 2 TO A SET 6 SETS                                    PER BOX

## (undated) DEVICE — PLUG,CATHETER,DRAINAGE PROTECTOR,TUBE: Brand: MEDLINE

## (undated) DEVICE — DRESSING,GAUZE,XEROFORM,CURAD,5"X9",ST: Brand: CURAD

## (undated) DEVICE — TUBING, SUCTION, 1/4" X 20', STRAIGHT: Brand: MEDLINE INDUSTRIES, INC.

## (undated) DEVICE — TAPE SURG W4INXL11YD 2IN PERF LINERLESS NONWOVEN MEDFIX EZ

## (undated) DEVICE — SOLUTION PREP PAINT POV IOD FOR SKIN MUCOUS MEM

## (undated) DEVICE — 3M™ IOBAN™ 2 ANTIMICROBIAL INCISE DRAPE 6650EZ: Brand: IOBAN™ 2

## (undated) DEVICE — [STANDARD 12-FLUTE BARREL BUR, ARTHROSCOPIC SHAVER BLADE,  DO NOT RESTERILIZE,  DO NOT USE IF PACKAGE IS DAMAGED,  KEEP DRY,  KEEP AWAY FROM SUNLIGHT]: Brand: FORMULA

## (undated) DEVICE — NEEDLE SPNL L3.5IN PNK HUB S STL REG WALL FIT STYL W/ QNCKE

## (undated) DEVICE — PENCIL SMK EVAC ALL IN 1 DSGN ENH VISIBILITY IMPROVED AIR

## (undated) DEVICE — DRAPE,U/SHT,SPLIT,FILM,60X84,STERILE: Brand: MEDLINE

## (undated) DEVICE — SYRINGE MED 30ML STD CLR PLAS LUERLOCK TIP N CTRL DISP

## (undated) DEVICE — DRAPE ADAPTABLE ARM POSITIONER